# Patient Record
Sex: FEMALE | Race: BLACK OR AFRICAN AMERICAN | Employment: UNEMPLOYED | URBAN - METROPOLITAN AREA
[De-identification: names, ages, dates, MRNs, and addresses within clinical notes are randomized per-mention and may not be internally consistent; named-entity substitution may affect disease eponyms.]

---

## 2017-01-20 ENCOUNTER — APPOINTMENT (OUTPATIENT)
Dept: CT IMAGING | Age: 72
End: 2017-01-20
Payer: MEDICAID

## 2017-01-20 ENCOUNTER — HOSPITAL ENCOUNTER (OUTPATIENT)
Age: 72
Setting detail: OBSERVATION
Discharge: NURSING, REHAB OR LONG-TERM CARE | End: 2017-01-21
Attending: HOSPITALIST | Admitting: HOSPITALIST
Payer: MEDICAID

## 2017-01-20 DIAGNOSIS — K92.2 UPPER GI BLEED: Primary | ICD-10-CM

## 2017-01-20 DIAGNOSIS — K92.0 HEMATEMESIS WITH NAUSEA: ICD-10-CM

## 2017-01-20 PROBLEM — R11.2 NAUSEA & VOMITING: Status: ACTIVE | Noted: 2017-01-20

## 2017-01-20 LAB
ABO/RH: NORMAL
ALBUMIN SERPL-MCNC: 4 G/DL (ref 3.9–4.9)
ALBUMIN SERPL-MCNC: 4.1 G/DL (ref 3.9–4.9)
ALP BLD-CCNC: 75 U/L (ref 40–130)
ALP BLD-CCNC: 76 U/L (ref 40–130)
ALT SERPL-CCNC: 8 U/L (ref 0–33)
ALT SERPL-CCNC: 8 U/L (ref 0–33)
AMMONIA: 26 UMOL/L (ref 11–51)
AMYLASE: 53 U/L (ref 28–100)
ANION GAP SERPL CALCULATED.3IONS-SCNC: 12 MEQ/L (ref 7–13)
ANTIBODY SCREEN: NORMAL
APTT: 26.9 SEC (ref 21.6–35.4)
AST SERPL-CCNC: 11 U/L (ref 0–35)
AST SERPL-CCNC: 13 U/L (ref 0–35)
BASOPHILS ABSOLUTE: 0.1 K/UL (ref 0–0.2)
BASOPHILS RELATIVE PERCENT: 1.5 %
BILIRUB SERPL-MCNC: 0.6 MG/DL (ref 0–1.2)
BILIRUB SERPL-MCNC: 0.7 MG/DL (ref 0–1.2)
BILIRUBIN DIRECT: 0.2 MG/DL (ref 0–0.3)
BILIRUBIN, INDIRECT: 0.5 MG/DL (ref 0–0.6)
BUN BLDV-MCNC: 34 MG/DL (ref 8–23)
C-REACTIVE PROTEIN, HIGH SENSITIVITY: 1 MG/L (ref 0–5)
CALCIUM SERPL-MCNC: 9.2 MG/DL (ref 8.6–10.2)
CHLORIDE BLD-SCNC: 102 MEQ/L (ref 98–107)
CO2: 25 MEQ/L (ref 22–29)
CREAT SERPL-MCNC: 1.1 MG/DL (ref 0.5–0.9)
EOSINOPHILS ABSOLUTE: 0 K/UL (ref 0–0.7)
EOSINOPHILS RELATIVE PERCENT: 0.1 %
GFR AFRICAN AMERICAN: 53
GFR AFRICAN AMERICAN: 58.9
GFR NON-AFRICAN AMERICAN: 44
GFR NON-AFRICAN AMERICAN: 48.7
GLOBULIN: 2.4 G/DL (ref 2.3–3.5)
GLUCOSE BLD-MCNC: 148 MG/DL (ref 74–109)
HCT VFR BLD CALC: 37.2 % (ref 37–47)
HEMOGLOBIN: 12.2 G/DL (ref 12–16)
INR BLD: 1
LACTIC ACID: 1.7 MMOL/L (ref 0.5–2.2)
LIPASE: 18 U/L (ref 13–60)
LIPASE: 18 U/L (ref 13–60)
LYMPHOCYTES ABSOLUTE: 1.9 K/UL (ref 1–4.8)
LYMPHOCYTES RELATIVE PERCENT: 19.6 %
MCH RBC QN AUTO: 27 PG (ref 27–31.3)
MCHC RBC AUTO-ENTMCNC: 32.7 % (ref 33–37)
MCV RBC AUTO: 82.5 FL (ref 82–100)
MONOCYTES ABSOLUTE: 0.5 K/UL (ref 0.2–0.8)
MONOCYTES RELATIVE PERCENT: 5.1 %
NEUTROPHILS ABSOLUTE: 7.1 K/UL (ref 1.4–6.5)
NEUTROPHILS RELATIVE PERCENT: 73.7 %
PDW BLD-RTO: 15.7 % (ref 11.5–14.5)
PERFORMED ON: ABNORMAL
PLATELET # BLD: 150 K/UL (ref 130–400)
POC CREATININE: 1.2 MG/DL (ref 0.6–1.2)
POC SAMPLE TYPE: ABNORMAL
POTASSIUM SERPL-SCNC: 4.2 MEQ/L (ref 3.5–5.1)
PROTHROMBIN TIME: 10.3 SEC (ref 8.1–13.7)
RBC # BLD: 4.51 M/UL (ref 4.2–5.4)
SEDIMENTATION RATE, ERYTHROCYTE: 25 MM (ref 0–30)
SODIUM BLD-SCNC: 139 MEQ/L (ref 132–144)
TOTAL PROTEIN: 6.4 G/DL (ref 6.4–8.1)
TOTAL PROTEIN: 6.6 G/DL (ref 6.4–8.1)
WBC # BLD: 9.7 K/UL (ref 4.8–10.8)

## 2017-01-20 PROCEDURE — 83605 ASSAY OF LACTIC ACID: CPT

## 2017-01-20 PROCEDURE — 82150 ASSAY OF AMYLASE: CPT

## 2017-01-20 PROCEDURE — 93005 ELECTROCARDIOGRAM TRACING: CPT

## 2017-01-20 PROCEDURE — 2580000003 HC RX 258: Performed by: PHYSICIAN ASSISTANT

## 2017-01-20 PROCEDURE — C9113 INJ PANTOPRAZOLE SODIUM, VIA: HCPCS | Performed by: PHYSICIAN ASSISTANT

## 2017-01-20 PROCEDURE — 82140 ASSAY OF AMMONIA: CPT

## 2017-01-20 PROCEDURE — 96374 THER/PROPH/DIAG INJ IV PUSH: CPT

## 2017-01-20 PROCEDURE — 85025 COMPLETE CBC W/AUTO DIFF WBC: CPT

## 2017-01-20 PROCEDURE — G0378 HOSPITAL OBSERVATION PER HR: HCPCS

## 2017-01-20 PROCEDURE — 85730 THROMBOPLASTIN TIME PARTIAL: CPT

## 2017-01-20 PROCEDURE — 6360000004 HC RX CONTRAST MEDICATION: Performed by: RADIOLOGY

## 2017-01-20 PROCEDURE — 6360000002 HC RX W HCPCS: Performed by: HOSPITALIST

## 2017-01-20 PROCEDURE — 86900 BLOOD TYPING SEROLOGIC ABO: CPT

## 2017-01-20 PROCEDURE — 85652 RBC SED RATE AUTOMATED: CPT

## 2017-01-20 PROCEDURE — 99285 EMERGENCY DEPT VISIT HI MDM: CPT

## 2017-01-20 PROCEDURE — 74177 CT ABD & PELVIS W/CONTRAST: CPT

## 2017-01-20 PROCEDURE — 80053 COMPREHEN METABOLIC PANEL: CPT

## 2017-01-20 PROCEDURE — 36415 COLL VENOUS BLD VENIPUNCTURE: CPT

## 2017-01-20 PROCEDURE — 96372 THER/PROPH/DIAG INJ SC/IM: CPT

## 2017-01-20 PROCEDURE — 85610 PROTHROMBIN TIME: CPT

## 2017-01-20 PROCEDURE — 86850 RBC ANTIBODY SCREEN: CPT

## 2017-01-20 PROCEDURE — 83690 ASSAY OF LIPASE: CPT

## 2017-01-20 PROCEDURE — 86901 BLOOD TYPING SEROLOGIC RH(D): CPT

## 2017-01-20 PROCEDURE — 86141 C-REACTIVE PROTEIN HS: CPT

## 2017-01-20 PROCEDURE — 6360000002 HC RX W HCPCS: Performed by: PHYSICIAN ASSISTANT

## 2017-01-20 PROCEDURE — 2580000003 HC RX 258: Performed by: HOSPITALIST

## 2017-01-20 RX ORDER — SODIUM CHLORIDE 0.9 % (FLUSH) 0.9 %
10 SYRINGE (ML) INJECTION PRN
Status: DISCONTINUED | OUTPATIENT
Start: 2017-01-20 | End: 2017-01-21 | Stop reason: HOSPADM

## 2017-01-20 RX ORDER — DOCUSATE SODIUM 100 MG/1
100 CAPSULE, LIQUID FILLED ORAL 2 TIMES DAILY
Status: DISCONTINUED | OUTPATIENT
Start: 2017-01-20 | End: 2017-01-21 | Stop reason: HOSPADM

## 2017-01-20 RX ORDER — ONDANSETRON 2 MG/ML
4 INJECTION INTRAMUSCULAR; INTRAVENOUS EVERY 6 HOURS PRN
Status: DISCONTINUED | OUTPATIENT
Start: 2017-01-20 | End: 2017-01-21 | Stop reason: HOSPADM

## 2017-01-20 RX ORDER — PANTOPRAZOLE SODIUM 40 MG/10ML
40 INJECTION, POWDER, LYOPHILIZED, FOR SOLUTION INTRAVENOUS DAILY
Status: DISCONTINUED | OUTPATIENT
Start: 2017-01-20 | End: 2017-01-21 | Stop reason: HOSPADM

## 2017-01-20 RX ORDER — PANTOPRAZOLE SODIUM 40 MG/1
40 TABLET, DELAYED RELEASE ORAL ONCE
Status: DISCONTINUED | OUTPATIENT
Start: 2017-01-20 | End: 2017-01-20

## 2017-01-20 RX ORDER — SODIUM CHLORIDE 0.9 % (FLUSH) 0.9 %
10 SYRINGE (ML) INJECTION EVERY 12 HOURS SCHEDULED
Status: DISCONTINUED | OUTPATIENT
Start: 2017-01-20 | End: 2017-01-21 | Stop reason: HOSPADM

## 2017-01-20 RX ORDER — 0.9 % SODIUM CHLORIDE 0.9 %
10 VIAL (ML) INJECTION EVERY 12 HOURS SCHEDULED
Status: CANCELLED | OUTPATIENT
Start: 2017-01-20

## 2017-01-20 RX ORDER — 0.9 % SODIUM CHLORIDE 0.9 %
10 VIAL (ML) INJECTION PRN
Status: CANCELLED | OUTPATIENT
Start: 2017-01-20

## 2017-01-20 RX ORDER — ACETAMINOPHEN 325 MG/1
650 TABLET ORAL EVERY 4 HOURS PRN
Status: DISCONTINUED | OUTPATIENT
Start: 2017-01-20 | End: 2017-01-21 | Stop reason: HOSPADM

## 2017-01-20 RX ORDER — SODIUM CHLORIDE 9 MG/ML
INJECTION, SOLUTION INTRAVENOUS CONTINUOUS
Status: DISCONTINUED | OUTPATIENT
Start: 2017-01-20 | End: 2017-01-21 | Stop reason: HOSPADM

## 2017-01-20 RX ADMIN — IOPAMIDOL 100 ML: 612 INJECTION, SOLUTION INTRAVENOUS at 15:30

## 2017-01-20 RX ADMIN — SODIUM CHLORIDE: 900 INJECTION, SOLUTION INTRAVENOUS at 22:29

## 2017-01-20 RX ADMIN — PANTOPRAZOLE SODIUM 40 MG: 40 INJECTION, POWDER, FOR SOLUTION INTRAVENOUS at 15:14

## 2017-01-20 RX ADMIN — ENOXAPARIN SODIUM 40 MG: 40 INJECTION SUBCUTANEOUS at 22:33

## 2017-01-20 RX ADMIN — SODIUM CHLORIDE: 900 INJECTION, SOLUTION INTRAVENOUS at 15:13

## 2017-01-20 RX ADMIN — SODIUM CHLORIDE, PRESERVATIVE FREE 10 ML: 5 INJECTION INTRAVENOUS at 22:29

## 2017-01-20 ASSESSMENT — ENCOUNTER SYMPTOMS
RHINORRHEA: 0
SORE THROAT: 0
ABDOMINAL PAIN: 0
STRIDOR: 0
EYE DISCHARGE: 0
WHEEZING: 0
COUGH: 0
SHORTNESS OF BREATH: 0
VOMITING: 1
CONSTIPATION: 0
CHOKING: 0
COLOR CHANGE: 0
DIARRHEA: 0
ABDOMINAL DISTENTION: 0
NAUSEA: 1

## 2017-01-20 ASSESSMENT — PAIN DESCRIPTION - PAIN TYPE: TYPE: ACUTE PAIN

## 2017-01-20 ASSESSMENT — PAIN SCALES - WONG BAKER: WONGBAKER_NUMERICALRESPONSE: 0

## 2017-01-20 ASSESSMENT — PAIN DESCRIPTION - ONSET: ONSET: GRADUAL

## 2017-01-20 ASSESSMENT — PAIN DESCRIPTION - PROGRESSION: CLINICAL_PROGRESSION: NOT CHANGED

## 2017-01-20 ASSESSMENT — PAIN SCALES - GENERAL: PAINLEVEL_OUTOF10: 0

## 2017-01-20 ASSESSMENT — PAIN DESCRIPTION - FREQUENCY: FREQUENCY: INTERMITTENT

## 2017-01-20 ASSESSMENT — PAIN DESCRIPTION - DESCRIPTORS: DESCRIPTORS: CRAMPING

## 2017-01-20 ASSESSMENT — PAIN DESCRIPTION - LOCATION: LOCATION: ABDOMEN

## 2017-01-21 ENCOUNTER — SURGERY (OUTPATIENT)
Age: 72
End: 2017-01-21

## 2017-01-21 ENCOUNTER — ANESTHESIA (OUTPATIENT)
Dept: OPERATING ROOM | Age: 72
End: 2017-01-21
Payer: MEDICAID

## 2017-01-21 ENCOUNTER — ANESTHESIA EVENT (OUTPATIENT)
Dept: OPERATING ROOM | Age: 72
End: 2017-01-21
Payer: MEDICAID

## 2017-01-21 VITALS
RESPIRATION RATE: 14 BRPM | OXYGEN SATURATION: 98 % | TEMPERATURE: 98.4 F | HEART RATE: 77 BPM | DIASTOLIC BLOOD PRESSURE: 71 MMHG | SYSTOLIC BLOOD PRESSURE: 165 MMHG | BODY MASS INDEX: 23.39 KG/M2 | WEIGHT: 132 LBS | HEIGHT: 63 IN

## 2017-01-21 VITALS
DIASTOLIC BLOOD PRESSURE: 62 MMHG | OXYGEN SATURATION: 99 % | SYSTOLIC BLOOD PRESSURE: 116 MMHG | RESPIRATION RATE: 10 BRPM

## 2017-01-21 PROBLEM — I16.0 HYPERTENSIVE URGENCY: Status: ACTIVE | Noted: 2017-01-21

## 2017-01-21 LAB
ANION GAP SERPL CALCULATED.3IONS-SCNC: 11 MEQ/L (ref 7–13)
BASOPHILS ABSOLUTE: 0.1 K/UL (ref 0–0.2)
BASOPHILS RELATIVE PERCENT: 0.8 %
BUN BLDV-MCNC: 22 MG/DL (ref 8–23)
CALCIUM SERPL-MCNC: 8.8 MG/DL (ref 8.6–10.2)
CHLORIDE BLD-SCNC: 110 MEQ/L (ref 98–107)
CO2: 23 MEQ/L (ref 22–29)
CREAT SERPL-MCNC: 0.71 MG/DL (ref 0.5–0.9)
EOSINOPHILS ABSOLUTE: 0 K/UL (ref 0–0.7)
EOSINOPHILS RELATIVE PERCENT: 0.2 %
GFR AFRICAN AMERICAN: >60
GFR NON-AFRICAN AMERICAN: >60
GLUCOSE BLD-MCNC: 144 MG/DL (ref 74–109)
HCT VFR BLD CALC: 35.5 % (ref 37–47)
HEMOGLOBIN: 11.4 G/DL (ref 12–16)
LYMPHOCYTES ABSOLUTE: 1.7 K/UL (ref 1–4.8)
LYMPHOCYTES RELATIVE PERCENT: 23 %
MAGNESIUM: 2.3 MG/DL (ref 1.7–2.3)
MCH RBC QN AUTO: 26.7 PG (ref 27–31.3)
MCHC RBC AUTO-ENTMCNC: 32.3 % (ref 33–37)
MCV RBC AUTO: 82.8 FL (ref 82–100)
MONOCYTES ABSOLUTE: 0.5 K/UL (ref 0.2–0.8)
MONOCYTES RELATIVE PERCENT: 7.1 %
NEUTROPHILS ABSOLUTE: 5.2 K/UL (ref 1.4–6.5)
NEUTROPHILS RELATIVE PERCENT: 68.9 %
PDW BLD-RTO: 16.1 % (ref 11.5–14.5)
PLATELET # BLD: 140 K/UL (ref 130–400)
POTASSIUM SERPL-SCNC: 4.2 MEQ/L (ref 3.5–5.1)
RBC # BLD: 4.28 M/UL (ref 4.2–5.4)
SODIUM BLD-SCNC: 144 MEQ/L (ref 132–144)
WBC # BLD: 7.6 K/UL (ref 4.8–10.8)

## 2017-01-21 PROCEDURE — 3609012400 HC EGD TRANSORAL BIOPSY SINGLE/MULTIPLE

## 2017-01-21 PROCEDURE — 2580000003 HC RX 258: Performed by: PHYSICIAN ASSISTANT

## 2017-01-21 PROCEDURE — G0378 HOSPITAL OBSERVATION PER HR: HCPCS

## 2017-01-21 PROCEDURE — 6360000002 HC RX W HCPCS: Performed by: STUDENT IN AN ORGANIZED HEALTH CARE EDUCATION/TRAINING PROGRAM

## 2017-01-21 PROCEDURE — 7100000001 HC PACU RECOVERY - ADDTL 15 MIN

## 2017-01-21 PROCEDURE — 80048 BASIC METABOLIC PNL TOTAL CA: CPT

## 2017-01-21 PROCEDURE — 3609012400 HC EGD TRANSORAL BIOPSY SINGLE/MULTIPLE: Performed by: SPECIALIST

## 2017-01-21 PROCEDURE — 2580000003 HC RX 258: Performed by: HOSPITALIST

## 2017-01-21 PROCEDURE — 85025 COMPLETE CBC W/AUTO DIFF WBC: CPT

## 2017-01-21 PROCEDURE — 7100000000 HC PACU RECOVERY - FIRST 15 MIN

## 2017-01-21 PROCEDURE — 7100000001 HC PACU RECOVERY - ADDTL 15 MIN: Performed by: SPECIALIST

## 2017-01-21 PROCEDURE — 6360000002 HC RX W HCPCS: Performed by: PHYSICIAN ASSISTANT

## 2017-01-21 PROCEDURE — 7100000000 HC PACU RECOVERY - FIRST 15 MIN: Performed by: SPECIALIST

## 2017-01-21 PROCEDURE — C9113 INJ PANTOPRAZOLE SODIUM, VIA: HCPCS | Performed by: PHYSICIAN ASSISTANT

## 2017-01-21 PROCEDURE — 36415 COLL VENOUS BLD VENIPUNCTURE: CPT

## 2017-01-21 PROCEDURE — 83735 ASSAY OF MAGNESIUM: CPT

## 2017-01-21 PROCEDURE — 2580000003 HC RX 258: Performed by: SPECIALIST

## 2017-01-21 RX ORDER — PANTOPRAZOLE SODIUM 40 MG/1
40 TABLET, DELAYED RELEASE ORAL DAILY
Qty: 30 TABLET | Refills: 0 | Status: SHIPPED | OUTPATIENT
Start: 2017-01-21 | End: 2020-08-07

## 2017-01-21 RX ORDER — PANTOPRAZOLE SODIUM 40 MG/10ML
40 INJECTION, POWDER, LYOPHILIZED, FOR SOLUTION INTRAVENOUS DAILY
Qty: 30 EACH | Refills: 0 | Status: SHIPPED | OUTPATIENT
Start: 2017-01-21 | End: 2017-01-21 | Stop reason: HOSPADM

## 2017-01-21 RX ORDER — PROPOFOL 10 MG/ML
INJECTION, EMULSION INTRAVENOUS PRN
Status: DISCONTINUED | OUTPATIENT
Start: 2017-01-21 | End: 2017-01-21 | Stop reason: SDUPTHER

## 2017-01-21 RX ORDER — HYDRALAZINE HYDROCHLORIDE 20 MG/ML
10 INJECTION INTRAMUSCULAR; INTRAVENOUS EVERY 6 HOURS PRN
Status: DISCONTINUED | OUTPATIENT
Start: 2017-01-21 | End: 2017-01-21 | Stop reason: HOSPADM

## 2017-01-21 RX ORDER — MAGNESIUM HYDROXIDE 1200 MG/15ML
LIQUID ORAL PRN
Status: DISCONTINUED | OUTPATIENT
Start: 2017-01-21 | End: 2017-01-21 | Stop reason: HOSPADM

## 2017-01-21 RX ADMIN — WATER 500 ML: 100 IRRIGANT IRRIGATION at 09:35

## 2017-01-21 RX ADMIN — SODIUM CHLORIDE: 900 INJECTION, SOLUTION INTRAVENOUS at 06:33

## 2017-01-21 RX ADMIN — PROPOFOL 20 MG: 10 INJECTION, EMULSION INTRAVENOUS at 09:31

## 2017-01-21 RX ADMIN — SODIUM CHLORIDE, PRESERVATIVE FREE 10 ML: 5 INJECTION INTRAVENOUS at 11:11

## 2017-01-21 RX ADMIN — SODIUM CHLORIDE: 900 INJECTION, SOLUTION INTRAVENOUS at 09:25

## 2017-01-21 RX ADMIN — PANTOPRAZOLE SODIUM 40 MG: 40 INJECTION, POWDER, FOR SOLUTION INTRAVENOUS at 11:11

## 2017-01-21 RX ADMIN — PROPOFOL 20 MG: 10 INJECTION, EMULSION INTRAVENOUS at 09:28

## 2017-01-31 LAB
EKG ATRIAL RATE: 92 BPM
EKG P AXIS: 63 DEGREES
EKG P-R INTERVAL: 122 MS
EKG Q-T INTERVAL: 364 MS
EKG QRS DURATION: 74 MS
EKG QTC CALCULATION (BAZETT): 450 MS
EKG R AXIS: 25 DEGREES
EKG T AXIS: 60 DEGREES
EKG VENTRICULAR RATE: 92 BPM

## 2017-03-19 ENCOUNTER — HOSPITAL ENCOUNTER (EMERGENCY)
Age: 72
Discharge: HOME | End: 2017-03-19
Payer: MEDICAID

## 2017-03-19 VITALS
OXYGEN SATURATION: 98 % | TEMPERATURE: 99 F | HEART RATE: 78 BPM | HEIGHT: 65 IN | SYSTOLIC BLOOD PRESSURE: 127 MMHG | WEIGHT: 270 LBS | BODY MASS INDEX: 44.98 KG/M2 | RESPIRATION RATE: 18 BRPM | DIASTOLIC BLOOD PRESSURE: 77 MMHG

## 2017-03-19 DIAGNOSIS — K22.10 EROSIVE ESOPHAGITIS: Primary | ICD-10-CM

## 2017-03-19 LAB
ABO/RH: NORMAL
ALBUMIN SERPL-MCNC: 3.6 G/DL (ref 3.9–4.9)
ALP BLD-CCNC: 75 U/L (ref 40–130)
ALT SERPL-CCNC: 10 U/L (ref 0–33)
ANION GAP SERPL CALCULATED.3IONS-SCNC: 10 MEQ/L (ref 7–13)
ANTIBODY SCREEN: NORMAL
APTT: 26.4 SEC (ref 21.6–35.4)
AST SERPL-CCNC: 12 U/L (ref 0–35)
BASOPHILS ABSOLUTE: 0.1 K/UL (ref 0–0.2)
BASOPHILS RELATIVE PERCENT: 1 %
BILIRUB SERPL-MCNC: 0.4 MG/DL (ref 0–1.2)
BUN BLDV-MCNC: 15 MG/DL (ref 8–23)
CALCIUM SERPL-MCNC: 9 MG/DL (ref 8.6–10.2)
CHLORIDE BLD-SCNC: 98 MEQ/L (ref 98–107)
CO2: 27 MEQ/L (ref 22–29)
CREAT SERPL-MCNC: 0.53 MG/DL (ref 0.5–0.9)
EOSINOPHILS ABSOLUTE: 0 K/UL (ref 0–0.7)
EOSINOPHILS RELATIVE PERCENT: 0.1 %
GFR AFRICAN AMERICAN: >60
GFR NON-AFRICAN AMERICAN: >60
GLOBULIN: 2.9 G/DL (ref 2.3–3.5)
GLUCOSE BLD-MCNC: 177 MG/DL (ref 74–109)
HCT VFR BLD CALC: 32.4 % (ref 37–47)
HEMOGLOBIN: 10.7 G/DL (ref 12–16)
INR BLD: 1
LYMPHOCYTES ABSOLUTE: 1.4 K/UL (ref 1–4.8)
LYMPHOCYTES RELATIVE PERCENT: 14.8 %
MCH RBC QN AUTO: 26.5 PG (ref 27–31.3)
MCHC RBC AUTO-ENTMCNC: 33 % (ref 33–37)
MCV RBC AUTO: 80.3 FL (ref 82–100)
MONOCYTES ABSOLUTE: 0.5 K/UL (ref 0.2–0.8)
MONOCYTES RELATIVE PERCENT: 5 %
NEUTROPHILS ABSOLUTE: 7.3 K/UL (ref 1.4–6.5)
NEUTROPHILS RELATIVE PERCENT: 79.1 %
PDW BLD-RTO: 16.2 % (ref 11.5–14.5)
PLATELET # BLD: 203 K/UL (ref 130–400)
POTASSIUM SERPL-SCNC: 4 MEQ/L (ref 3.5–5.1)
PROTHROMBIN TIME: 10.5 SEC (ref 8.1–13.7)
RBC # BLD: 4.04 M/UL (ref 4.2–5.4)
SODIUM BLD-SCNC: 135 MEQ/L (ref 132–144)
TOTAL PROTEIN: 6.5 G/DL (ref 6.4–8.1)
WBC # BLD: 9.3 K/UL (ref 4.8–10.8)

## 2017-03-19 PROCEDURE — 96374 THER/PROPH/DIAG INJ IV PUSH: CPT

## 2017-03-19 PROCEDURE — 99285 EMERGENCY DEPT VISIT HI MDM: CPT

## 2017-03-19 PROCEDURE — 85610 PROTHROMBIN TIME: CPT

## 2017-03-19 PROCEDURE — 85025 COMPLETE CBC W/AUTO DIFF WBC: CPT

## 2017-03-19 PROCEDURE — 93005 ELECTROCARDIOGRAM TRACING: CPT

## 2017-03-19 PROCEDURE — 6360000002 HC RX W HCPCS: Performed by: PHYSICIAN ASSISTANT

## 2017-03-19 PROCEDURE — 85730 THROMBOPLASTIN TIME PARTIAL: CPT

## 2017-03-19 PROCEDURE — 86850 RBC ANTIBODY SCREEN: CPT

## 2017-03-19 PROCEDURE — 36415 COLL VENOUS BLD VENIPUNCTURE: CPT

## 2017-03-19 PROCEDURE — C9113 INJ PANTOPRAZOLE SODIUM, VIA: HCPCS | Performed by: PHYSICIAN ASSISTANT

## 2017-03-19 PROCEDURE — 80053 COMPREHEN METABOLIC PANEL: CPT

## 2017-03-19 PROCEDURE — 86900 BLOOD TYPING SEROLOGIC ABO: CPT

## 2017-03-19 PROCEDURE — 86901 BLOOD TYPING SEROLOGIC RH(D): CPT

## 2017-03-19 RX ORDER — PANTOPRAZOLE SODIUM 40 MG/1
40 TABLET, DELAYED RELEASE ORAL ONCE
Status: DISCONTINUED | OUTPATIENT
Start: 2017-03-19 | End: 2017-03-19

## 2017-03-19 RX ORDER — PANTOPRAZOLE SODIUM 40 MG/10ML
40 INJECTION, POWDER, LYOPHILIZED, FOR SOLUTION INTRAVENOUS DAILY
Status: DISCONTINUED | OUTPATIENT
Start: 2017-03-19 | End: 2017-03-19 | Stop reason: HOSPADM

## 2017-03-19 RX ORDER — ONDANSETRON 2 MG/ML
4 INJECTION INTRAMUSCULAR; INTRAVENOUS ONCE
Status: COMPLETED | OUTPATIENT
Start: 2017-03-19 | End: 2017-03-19

## 2017-03-19 RX ADMIN — PANTOPRAZOLE SODIUM 40 MG: 40 INJECTION, POWDER, FOR SOLUTION INTRAVENOUS at 08:34

## 2017-03-19 RX ADMIN — ONDANSETRON 4 MG: 2 INJECTION, SOLUTION INTRAMUSCULAR; INTRAVENOUS at 08:34

## 2017-03-19 ASSESSMENT — ENCOUNTER SYMPTOMS
RHINORRHEA: 0
SHORTNESS OF BREATH: 0
COLOR CHANGE: 0
EYE DISCHARGE: 0
ABDOMINAL PAIN: 0
CHOKING: 0
COUGH: 0
CONSTIPATION: 0
CHEST TIGHTNESS: 0
NAUSEA: 1
DIARRHEA: 0
VOMITING: 1
STRIDOR: 0
WHEEZING: 0
SORE THROAT: 0
ABDOMINAL DISTENTION: 0

## 2017-03-20 LAB
EKG ATRIAL RATE: 93 BPM
EKG P AXIS: 58 DEGREES
EKG P-R INTERVAL: 124 MS
EKG Q-T INTERVAL: 362 MS
EKG QRS DURATION: 72 MS
EKG QTC CALCULATION (BAZETT): 450 MS
EKG R AXIS: 4 DEGREES
EKG T AXIS: 18 DEGREES
EKG VENTRICULAR RATE: 93 BPM

## 2017-06-20 ENCOUNTER — HOSPITAL ENCOUNTER (OUTPATIENT)
Dept: CT IMAGING | Age: 72
Discharge: HOME | End: 2017-06-20
Payer: MEDICAID
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
## 2017-06-20 PROCEDURE — 70450 CT HEAD/BRAIN W/O DYE: CPT

## 2017-09-04 ENCOUNTER — HOSPITAL ENCOUNTER (EMERGENCY)
Age: 72
Discharge: HOME | End: 2017-09-04
Attending: STUDENT IN AN ORGANIZED HEALTH CARE EDUCATION/TRAINING PROGRAM
Payer: MEDICAID

## 2017-09-04 ENCOUNTER — APPOINTMENT (OUTPATIENT)
Dept: GENERAL RADIOLOGY | Age: 72
End: 2017-09-04
Payer: MEDICAID

## 2017-09-04 ENCOUNTER — APPOINTMENT (OUTPATIENT)
Dept: CT IMAGING | Age: 72
End: 2017-09-04
Payer: MEDICAID

## 2017-09-04 VITALS
BODY MASS INDEX: 40.85 KG/M2 | HEIGHT: 62 IN | SYSTOLIC BLOOD PRESSURE: 121 MMHG | RESPIRATION RATE: 20 BRPM | DIASTOLIC BLOOD PRESSURE: 55 MMHG | HEART RATE: 77 BPM | WEIGHT: 222 LBS | OXYGEN SATURATION: 100 % | TEMPERATURE: 99 F

## 2017-09-04 DIAGNOSIS — R11.2 NON-INTRACTABLE VOMITING WITH NAUSEA, UNSPECIFIED VOMITING TYPE: Primary | ICD-10-CM

## 2017-09-04 DIAGNOSIS — R53.1 EPISODIC WEAKNESS: ICD-10-CM

## 2017-09-04 DIAGNOSIS — N28.9 RENAL INSUFFICIENCY: ICD-10-CM

## 2017-09-04 LAB
ABO/RH: NORMAL
ALBUMIN SERPL-MCNC: 3.5 G/DL (ref 3.9–4.9)
ALP BLD-CCNC: 79 U/L (ref 40–130)
ALT SERPL-CCNC: 10 U/L (ref 0–33)
ANION GAP SERPL CALCULATED.3IONS-SCNC: 21 MEQ/L (ref 7–13)
ANISOCYTOSIS: ABNORMAL
ANTIBODY SCREEN: NORMAL
AST SERPL-CCNC: 12 U/L (ref 0–35)
BASOPHILS ABSOLUTE: 0.1 K/UL (ref 0–0.2)
BASOPHILS RELATIVE PERCENT: 1 %
BILIRUB SERPL-MCNC: 0.5 MG/DL (ref 0–1.2)
BUN BLDV-MCNC: 35 MG/DL (ref 8–23)
CALCIUM SERPL-MCNC: 9 MG/DL (ref 8.6–10.2)
CHLORIDE BLD-SCNC: 106 MEQ/L (ref 98–107)
CO2: 24 MEQ/L (ref 22–29)
CREAT SERPL-MCNC: 1.39 MG/DL (ref 0.5–0.9)
EOSINOPHILS ABSOLUTE: 0 K/UL (ref 0–0.7)
EOSINOPHILS RELATIVE PERCENT: 0.1 %
GFR AFRICAN AMERICAN: 44.9
GFR NON-AFRICAN AMERICAN: 37.1
GLOBULIN: 2.8 G/DL (ref 2.3–3.5)
GLUCOSE BLD-MCNC: 124 MG/DL (ref 74–109)
HCT VFR BLD CALC: 34.3 % (ref 37–47)
HEMOGLOBIN: 10.7 G/DL (ref 12–16)
LYMPHOCYTES ABSOLUTE: 2.3 K/UL (ref 1–4.8)
LYMPHOCYTES RELATIVE PERCENT: 32.3 %
MCH RBC QN AUTO: 23.2 PG (ref 27–31.3)
MCHC RBC AUTO-ENTMCNC: 31.3 % (ref 33–37)
MCV RBC AUTO: 74 FL (ref 82–100)
MICROCYTES: ABNORMAL
MONOCYTES ABSOLUTE: 0.5 K/UL (ref 0.2–0.8)
MONOCYTES RELATIVE PERCENT: 7.6 %
NEUTROPHILS ABSOLUTE: 4.2 K/UL (ref 1.4–6.5)
NEUTROPHILS RELATIVE PERCENT: 59 %
PDW BLD-RTO: 20 % (ref 11.5–14.5)
PLATELET # BLD: 157 K/UL (ref 130–400)
PLATELET # BLD: ABNORMAL K/UL (ref 130–400)
POTASSIUM SERPL-SCNC: 4 MEQ/L (ref 3.5–5.1)
RBC # BLD: 4.63 M/UL (ref 4.2–5.4)
SODIUM BLD-SCNC: 151 MEQ/L (ref 132–144)
TOTAL CK: 26 U/L (ref 0–170)
TOTAL PROTEIN: 6.3 G/DL (ref 6.4–8.1)
TROPONIN: <0.01 NG/ML (ref 0–0.01)
WBC # BLD: 7 K/UL (ref 4.8–10.8)

## 2017-09-04 PROCEDURE — 85025 COMPLETE CBC W/AUTO DIFF WBC: CPT

## 2017-09-04 PROCEDURE — 99284 EMERGENCY DEPT VISIT MOD MDM: CPT

## 2017-09-04 PROCEDURE — 93005 ELECTROCARDIOGRAM TRACING: CPT

## 2017-09-04 PROCEDURE — 6370000000 HC RX 637 (ALT 250 FOR IP): Performed by: NURSE PRACTITIONER

## 2017-09-04 PROCEDURE — 84484 ASSAY OF TROPONIN QUANT: CPT

## 2017-09-04 PROCEDURE — 72125 CT NECK SPINE W/O DYE: CPT

## 2017-09-04 PROCEDURE — 70450 CT HEAD/BRAIN W/O DYE: CPT

## 2017-09-04 PROCEDURE — 85049 AUTOMATED PLATELET COUNT: CPT

## 2017-09-04 PROCEDURE — 86850 RBC ANTIBODY SCREEN: CPT

## 2017-09-04 PROCEDURE — 71010 XR CHEST PORTABLE: CPT

## 2017-09-04 PROCEDURE — 86900 BLOOD TYPING SEROLOGIC ABO: CPT

## 2017-09-04 PROCEDURE — 80053 COMPREHEN METABOLIC PANEL: CPT

## 2017-09-04 PROCEDURE — 36415 COLL VENOUS BLD VENIPUNCTURE: CPT

## 2017-09-04 PROCEDURE — 82550 ASSAY OF CK (CPK): CPT

## 2017-09-04 PROCEDURE — 86901 BLOOD TYPING SEROLOGIC RH(D): CPT

## 2017-09-04 PROCEDURE — 81003 URINALYSIS AUTO W/O SCOPE: CPT

## 2017-09-04 RX ORDER — LORAZEPAM 1 MG/1
1 TABLET ORAL ONCE
Status: COMPLETED | OUTPATIENT
Start: 2017-09-04 | End: 2017-09-04

## 2017-09-04 RX ADMIN — LORAZEPAM 1 MG: 1 TABLET ORAL at 15:11

## 2017-09-05 LAB
EKG ATRIAL RATE: 74 BPM
EKG P AXIS: 62 DEGREES
EKG P-R INTERVAL: 130 MS
EKG Q-T INTERVAL: 404 MS
EKG QRS DURATION: 78 MS
EKG QTC CALCULATION (BAZETT): 448 MS
EKG R AXIS: 17 DEGREES
EKG T AXIS: 54 DEGREES
EKG VENTRICULAR RATE: 74 BPM

## 2017-09-05 PROCEDURE — 93010 ELECTROCARDIOGRAM REPORT: CPT | Performed by: INTERNAL MEDICINE

## 2017-11-16 ENCOUNTER — HOSPITAL ENCOUNTER (EMERGENCY)
Age: 72
Discharge: NURSING, REHAB OR LONG-TERM CARE | End: 2017-11-16
Attending: EMERGENCY MEDICINE
Payer: MEDICAID

## 2017-11-16 VITALS
SYSTOLIC BLOOD PRESSURE: 142 MMHG | OXYGEN SATURATION: 100 % | DIASTOLIC BLOOD PRESSURE: 65 MMHG | RESPIRATION RATE: 18 BRPM | HEART RATE: 60 BPM | TEMPERATURE: 98.6 F | HEIGHT: 65 IN | WEIGHT: 220 LBS | BODY MASS INDEX: 36.65 KG/M2

## 2017-11-16 DIAGNOSIS — T78.3XXA ALLERGIC ANGIOEDEMA, INITIAL ENCOUNTER: Primary | ICD-10-CM

## 2017-11-16 PROCEDURE — 99283 EMERGENCY DEPT VISIT LOW MDM: CPT

## 2017-11-16 PROCEDURE — 6370000000 HC RX 637 (ALT 250 FOR IP): Performed by: EMERGENCY MEDICINE

## 2017-11-16 RX ORDER — DIPHENHYDRAMINE HYDROCHLORIDE 50 MG/ML
25 INJECTION INTRAMUSCULAR; INTRAVENOUS ONCE
Status: DISCONTINUED | OUTPATIENT
Start: 2017-11-16 | End: 2017-11-16 | Stop reason: HOSPADM

## 2017-11-16 RX ORDER — METHYLPREDNISOLONE SODIUM SUCCINATE 125 MG/2ML
125 INJECTION, POWDER, LYOPHILIZED, FOR SOLUTION INTRAMUSCULAR; INTRAVENOUS ONCE
Status: DISCONTINUED | OUTPATIENT
Start: 2017-11-16 | End: 2017-11-16 | Stop reason: HOSPADM

## 2017-11-16 RX ORDER — PREDNISONE 10 MG/1
60 TABLET ORAL DAILY
Qty: 30 TABLET | Refills: 0 | Status: SHIPPED | OUTPATIENT
Start: 2017-11-16 | End: 2017-11-21

## 2017-11-16 RX ORDER — PREDNISONE 20 MG/1
60 TABLET ORAL ONCE
Status: COMPLETED | OUTPATIENT
Start: 2017-11-16 | End: 2017-11-16

## 2017-11-16 RX ORDER — FAMOTIDINE 20 MG/1
20 TABLET, FILM COATED ORAL ONCE
Status: COMPLETED | OUTPATIENT
Start: 2017-11-16 | End: 2017-11-16

## 2017-11-16 RX ADMIN — FAMOTIDINE 20 MG: 20 TABLET, FILM COATED ORAL at 17:59

## 2017-11-16 RX ADMIN — PREDNISONE 60 MG: 20 TABLET ORAL at 17:59

## 2017-11-16 ASSESSMENT — ENCOUNTER SYMPTOMS
COUGH: 0
FACIAL SWELLING: 1
SINUS PAIN: 0
ABDOMINAL PAIN: 0
TROUBLE SWALLOWING: 0

## 2017-11-16 NOTE — ED NOTES
Bed: 28  Expected date: 11/16/17  Expected time:   Means of arrival:   Comments:  66 y/o female, dementia, snet for swollen lips, given benadryl at 2:30pm, full code     Destin Quiroga RN  11/16/17 3767

## 2017-11-16 NOTE — ED PROVIDER NOTES
[facility] ED  eMERGENCY dEPARTMENT eNCOUnter      Pt Name: Leonel Gray  MRN: [id number]  [place] 1945  Date of evaluation: 11/16/2017  Provider: Bonita Guevara MD    [address] [place] [address]       Chief Complaint   Patient presents with    Oral Swelling     swollen lips since 2pm today         HISTORY OF PRESENT ILLNESS   (Location/Symptom, Timing/Onset, Context/Setting, Quality, Duration, Modifying Factors, Severity)  Note limiting factors. Leonel Gray is a 67 y.o. female who presents to the emergency department With lip swelling. Patient is currently at a nursing home. She began complaining of some itching in her lips at about 2 PM.  She was given oral Benadryl. They know noticed that it seemed to swell more. She is not on an ACE inhibitor. She denies any chest pain or shortness of breath. There is no documented exposures. She takes no other medication with known angioedema as a side effect. HPI    Nursing Notes were reviewed. REVIEW OF SYSTEMS    (2-9 systems for level 4, 10 or more for level 5)     Review of Systems   HENT: Positive for facial swelling. Negative for mouth sores, sinus pain and trouble swallowing. Respiratory: Negative for cough. Cardiovascular: Negative for chest pain. Gastrointestinal: Negative for abdominal pain. Genitourinary: Negative for dysuria. Neurological: Negative for dizziness. Except as noted above the remainder of the review of systems was reviewed and negative.        PAST MEDICAL HISTORY     Past Medical History:   Diagnosis Date    Alzheimer disease     Diabetes mellitus ([address]     GERD (gastroesophageal reflux disease)     Hypertension     Osteoarthritis     Retinopathy          SURGICAL HISTORY       Past Surgical History:   Procedure Laterality Date    EYE SURGERY      HYSTERECTOMY      NM EGD TRANSORAL BIOPSY SINGLE/MULTIPLE N/A 1/21/2017    EGD BIOPSY performed by Esther Parks MD at [facility] widely patent. Eyes: Conjunctivae and EOM are normal. Pupils are equal, round, and reactive to light. Right eye exhibits no discharge. Left eye exhibits no discharge. Neck: Normal range of motion. Neck supple. Cardiovascular: Normal rate and regular rhythm. No murmur heard. Pulmonary/Chest: Effort normal and breath sounds normal. No stridor. No respiratory distress. Abdominal: Soft. Bowel sounds are normal. She exhibits no distension. There is no tenderness. Musculoskeletal: Normal range of motion. Neurological: She is alert. No cranial nerve deficit. Skin: Skin is warm and dry. Nursing note and vitals reviewed. DIAGNOSTIC RESULTS     EKG: All EKG's are interpreted by the Emergency Department Physician who either signs or Co-signs this chart in the absence of a cardiologist.        RADIOLOGY:   Non-plain film images such as CT, Ultrasound and MRI are read by the radiologist. Plain radiographic images are visualized and preliminarily interpreted by the emergency physician with the below findings:        Interpretation per the Radiologist below, if available at the time of this note:    No orders to display         ED BEDSIDE ULTRASOUND:   Performed by ED Physician - none    LABS:  Labs Reviewed - No data to display    All other labs were within normal range or not returned as of this dictation. EMERGENCY DEPARTMENT COURSE and DIFFERENTIAL DIAGNOSIS/MDM:   Vitals:    Vitals:    11/16/17 1713 11/16/17 1715   BP:  (!) 142/65   Pulse: 60    Resp: 18    Temp: 98.6 °F (37 °C)    TempSrc: Oral    SpO2: 100%    Weight: 220 lb (99.8 kg)    Height: 5' 5\" (1.651 m)        The patient presents with allergic angioedema. She's had the same before when she was exposed parsley. It did not involve the tongue. She had received Benadryl en route. We were unable to establish IV the access. However, the patient has no evidence of anaphylaxis. She was given prednisone and Pepcid.   Within an hour, the

## 2017-11-17 NOTE — ED NOTES
Patient discharge instructions reviewed with family at this time. Family states understanding of the discharge instructions, need for further follow up, and when to return to the ER for worsening symptoms. Patient taken out to family car via wheelchair, no distress noted at this time.       Emmy Ko RN  11/16/17 4947

## 2018-05-09 ENCOUNTER — APPOINTMENT (OUTPATIENT)
Dept: GENERAL RADIOLOGY | Age: 73
DRG: 720 | End: 2018-05-09
Payer: MEDICAID

## 2018-05-09 ENCOUNTER — APPOINTMENT (OUTPATIENT)
Dept: CT IMAGING | Age: 73
DRG: 720 | End: 2018-05-09
Payer: MEDICAID

## 2018-05-09 ENCOUNTER — HOSPITAL ENCOUNTER (INPATIENT)
Age: 73
LOS: 9 days | Discharge: NURSING, REHAB OR LONG-TERM CARE | DRG: 720 | End: 2018-05-18
Attending: STUDENT IN AN ORGANIZED HEALTH CARE EDUCATION/TRAINING PROGRAM | Admitting: INTERNAL MEDICINE
Payer: MEDICAID

## 2018-05-09 DIAGNOSIS — R00.1 BRADYCARDIA: ICD-10-CM

## 2018-05-09 DIAGNOSIS — T68.XXXA HYPOTHERMIA, INITIAL ENCOUNTER: Primary | ICD-10-CM

## 2018-05-09 DIAGNOSIS — I95.9 HYPOTENSION, UNSPECIFIED HYPOTENSION TYPE: ICD-10-CM

## 2018-05-09 DIAGNOSIS — N30.00 ACUTE CYSTITIS WITHOUT HEMATURIA: ICD-10-CM

## 2018-05-09 PROBLEM — A41.9 SEPSIS (HCC): Status: ACTIVE | Noted: 2018-05-09

## 2018-05-09 LAB
ALBUMIN SERPL-MCNC: 3.6 G/DL (ref 3.9–4.9)
ALP BLD-CCNC: 132 U/L (ref 40–130)
ALT SERPL-CCNC: 47 U/L (ref 0–33)
ANION GAP SERPL CALCULATED.3IONS-SCNC: 14 MEQ/L (ref 7–13)
ANISOCYTOSIS: ABNORMAL
APTT: 32.2 SEC (ref 21.6–35.4)
AST SERPL-CCNC: 23 U/L (ref 0–35)
BACTERIA: ABNORMAL /HPF
BASOPHILS ABSOLUTE: 0 K/UL (ref 0–0.2)
BASOPHILS RELATIVE PERCENT: 0.7 %
BILIRUB SERPL-MCNC: <0.2 MG/DL (ref 0–1.2)
BILIRUBIN URINE: ABNORMAL
BLOOD, URINE: ABNORMAL
BUN BLDV-MCNC: 42 MG/DL (ref 8–23)
C-REACTIVE PROTEIN, HIGH SENSITIVITY: 11.1 MG/L (ref 0–5)
CALCIUM SERPL-MCNC: 8.9 MG/DL (ref 8.6–10.2)
CASTS: ABNORMAL /LPF
CHLORIDE BLD-SCNC: 115 MEQ/L (ref 98–107)
CHP ED QC CHECK: YES
CLARITY: ABNORMAL
CO2: 25 MEQ/L (ref 22–29)
COLOR: YELLOW
CREAT SERPL-MCNC: 2.22 MG/DL (ref 0.5–0.9)
EOSINOPHILS ABSOLUTE: 0 K/UL (ref 0–0.7)
EOSINOPHILS RELATIVE PERCENT: 0.7 %
EPITHELIAL CELLS, UA: ABNORMAL /HPF
GFR AFRICAN AMERICAN: 26.1
GFR NON-AFRICAN AMERICAN: 21.6
GLOBULIN: 3.1 G/DL (ref 2.3–3.5)
GLUCOSE BLD-MCNC: 100 MG/DL
GLUCOSE BLD-MCNC: 100 MG/DL (ref 60–115)
GLUCOSE BLD-MCNC: 99 MG/DL (ref 74–109)
GLUCOSE URINE: NEGATIVE MG/DL
HCT VFR BLD CALC: 40.3 % (ref 37–47)
HEMOGLOBIN: 13.3 G/DL (ref 12–16)
INR BLD: 1
KETONES, URINE: ABNORMAL MG/DL
LACTIC ACID: 1.1 MMOL/L (ref 0.5–2.2)
LACTIC ACID: 1.6 MMOL/L (ref 0.5–2.2)
LEUKOCYTE ESTERASE, URINE: ABNORMAL
LYMPHOCYTES ABSOLUTE: 0.8 K/UL (ref 1–4.8)
LYMPHOCYTES RELATIVE PERCENT: 19.1 %
MACROCYTES: ABNORMAL
MAGNESIUM: 2.9 MG/DL (ref 1.7–2.3)
MCH RBC QN AUTO: 27.4 PG (ref 27–31.3)
MCHC RBC AUTO-ENTMCNC: 33.1 % (ref 33–37)
MCV RBC AUTO: 82.7 FL (ref 82–100)
MONOCYTES ABSOLUTE: 0.2 K/UL (ref 0.2–0.8)
MONOCYTES RELATIVE PERCENT: 4.1 %
NEUTROPHILS ABSOLUTE: 3 K/UL (ref 1.4–6.5)
NEUTROPHILS RELATIVE PERCENT: 75.4 %
NITRITE, URINE: POSITIVE
PDW BLD-RTO: 23.1 % (ref 11.5–14.5)
PERFORMED ON: NORMAL
PH UA: 5 (ref 5–9)
PLATELET # BLD: 28 K/UL (ref 130–400)
PLATELET SLIDE REVIEW: ABNORMAL
POTASSIUM SERPL-SCNC: 4.6 MEQ/L (ref 3.5–5.1)
PRO-BNP: 394 PG/ML
PROTEIN UA: NEGATIVE MG/DL
PROTHROMBIN TIME: 10.6 SEC (ref 9.6–12.3)
RBC # BLD: 4.87 M/UL (ref 4.2–5.4)
RBC UA: ABNORMAL /HPF (ref 0–2)
SODIUM BLD-SCNC: 154 MEQ/L (ref 132–144)
SPECIFIC GRAVITY UA: 1.02 (ref 1–1.03)
TOTAL CK: 48 U/L (ref 0–170)
TOTAL PROTEIN: 6.7 G/DL (ref 6.4–8.1)
TROPONIN: 0.04 NG/ML (ref 0–0.01)
URINE REFLEX TO CULTURE: YES
UROBILINOGEN, URINE: 0.2 E.U./DL
WBC # BLD: 4 K/UL (ref 4.8–10.8)
WBC UA: >100 /HPF (ref 0–5)

## 2018-05-09 PROCEDURE — 84484 ASSAY OF TROPONIN QUANT: CPT

## 2018-05-09 PROCEDURE — 2580000003 HC RX 258

## 2018-05-09 PROCEDURE — 81001 URINALYSIS AUTO W/SCOPE: CPT

## 2018-05-09 PROCEDURE — 82550 ASSAY OF CK (CPK): CPT

## 2018-05-09 PROCEDURE — 87040 BLOOD CULTURE FOR BACTERIA: CPT

## 2018-05-09 PROCEDURE — 85025 COMPLETE CBC W/AUTO DIFF WBC: CPT

## 2018-05-09 PROCEDURE — 83735 ASSAY OF MAGNESIUM: CPT

## 2018-05-09 PROCEDURE — 2500000003 HC RX 250 WO HCPCS: Performed by: NURSE PRACTITIONER

## 2018-05-09 PROCEDURE — 2580000003 HC RX 258: Performed by: INTERNAL MEDICINE

## 2018-05-09 PROCEDURE — 83605 ASSAY OF LACTIC ACID: CPT

## 2018-05-09 PROCEDURE — 83880 ASSAY OF NATRIURETIC PEPTIDE: CPT

## 2018-05-09 PROCEDURE — 71045 X-RAY EXAM CHEST 1 VIEW: CPT

## 2018-05-09 PROCEDURE — 2000000000 HC ICU R&B

## 2018-05-09 PROCEDURE — 96365 THER/PROPH/DIAG IV INF INIT: CPT

## 2018-05-09 PROCEDURE — 6360000002 HC RX W HCPCS: Performed by: INTERNAL MEDICINE

## 2018-05-09 PROCEDURE — 51702 INSERT TEMP BLADDER CATH: CPT

## 2018-05-09 PROCEDURE — 2500000003 HC RX 250 WO HCPCS: Performed by: INTERNAL MEDICINE

## 2018-05-09 PROCEDURE — 70450 CT HEAD/BRAIN W/O DYE: CPT

## 2018-05-09 PROCEDURE — 87186 SC STD MICRODIL/AGAR DIL: CPT

## 2018-05-09 PROCEDURE — 36415 COLL VENOUS BLD VENIPUNCTURE: CPT

## 2018-05-09 PROCEDURE — 87077 CULTURE AEROBIC IDENTIFY: CPT

## 2018-05-09 PROCEDURE — 6360000002 HC RX W HCPCS: Performed by: NURSE PRACTITIONER

## 2018-05-09 PROCEDURE — 93005 ELECTROCARDIOGRAM TRACING: CPT

## 2018-05-09 PROCEDURE — 2580000003 HC RX 258: Performed by: NURSE PRACTITIONER

## 2018-05-09 PROCEDURE — 87086 URINE CULTURE/COLONY COUNT: CPT

## 2018-05-09 PROCEDURE — 85610 PROTHROMBIN TIME: CPT

## 2018-05-09 PROCEDURE — 85730 THROMBOPLASTIN TIME PARTIAL: CPT

## 2018-05-09 PROCEDURE — 80053 COMPREHEN METABOLIC PANEL: CPT

## 2018-05-09 PROCEDURE — 86141 C-REACTIVE PROTEIN HS: CPT

## 2018-05-09 PROCEDURE — 99285 EMERGENCY DEPT VISIT HI MDM: CPT

## 2018-05-09 PROCEDURE — 96375 TX/PRO/DX INJ NEW DRUG ADDON: CPT

## 2018-05-09 RX ORDER — 0.9 % SODIUM CHLORIDE 0.9 %
1000 INTRAVENOUS SOLUTION INTRAVENOUS ONCE
Status: COMPLETED | OUTPATIENT
Start: 2018-05-09 | End: 2018-05-09

## 2018-05-09 RX ORDER — 0.9 % SODIUM CHLORIDE 0.9 %
10 VIAL (ML) INJECTION DAILY
Status: DISCONTINUED | OUTPATIENT
Start: 2018-05-10 | End: 2018-05-18 | Stop reason: HOSPADM

## 2018-05-09 RX ORDER — ATROPINE SULFATE 0.1 MG/ML
1 INJECTION INTRAVENOUS ONCE
Status: COMPLETED | OUTPATIENT
Start: 2018-05-09 | End: 2018-05-09

## 2018-05-09 RX ORDER — LANOLIN ALCOHOL/MO/W.PET/CERES
500 CREAM (GRAM) TOPICAL NIGHTLY
Status: DISPENSED | OUTPATIENT
Start: 2018-05-09 | End: 2018-05-10

## 2018-05-09 RX ORDER — FERROUS SULFATE 325(65) MG
325 TABLET ORAL
Status: DISCONTINUED | OUTPATIENT
Start: 2018-05-10 | End: 2018-05-10

## 2018-05-09 RX ORDER — DEXTROSE, SODIUM CHLORIDE, SODIUM LACTATE, POTASSIUM CHLORIDE, AND CALCIUM CHLORIDE 5; .6; .31; .03; .02 G/100ML; G/100ML; G/100ML; G/100ML; G/100ML
INJECTION, SOLUTION INTRAVENOUS CONTINUOUS
Status: DISCONTINUED | OUTPATIENT
Start: 2018-05-09 | End: 2018-05-10

## 2018-05-09 RX ORDER — SODIUM CHLORIDE 0.9 % (FLUSH) 0.9 %
10 SYRINGE (ML) INJECTION PRN
Status: DISCONTINUED | OUTPATIENT
Start: 2018-05-09 | End: 2018-05-14 | Stop reason: SDUPTHER

## 2018-05-09 RX ORDER — PANTOPRAZOLE SODIUM 40 MG/10ML
40 INJECTION, POWDER, LYOPHILIZED, FOR SOLUTION INTRAVENOUS DAILY
Status: DISCONTINUED | OUTPATIENT
Start: 2018-05-10 | End: 2018-05-18 | Stop reason: HOSPADM

## 2018-05-09 RX ORDER — PHENOL 1.4 %
1 AEROSOL, SPRAY (ML) MUCOUS MEMBRANE DAILY
COMMUNITY
End: 2020-08-07

## 2018-05-09 RX ORDER — FERROUS SULFATE 325(65) MG
325 TABLET ORAL
COMMUNITY
End: 2020-08-07

## 2018-05-09 RX ORDER — SODIUM CHLORIDE 9 MG/ML
INJECTION, SOLUTION INTRAVENOUS
Status: COMPLETED
Start: 2018-05-09 | End: 2018-05-09

## 2018-05-09 RX ORDER — PANTOPRAZOLE SODIUM 40 MG/1
40 TABLET, DELAYED RELEASE ORAL DAILY
Status: DISCONTINUED | OUTPATIENT
Start: 2018-05-10 | End: 2018-05-09

## 2018-05-09 RX ORDER — DIVALPROEX SODIUM 125 MG/1
125 CAPSULE, COATED PELLETS ORAL DAILY
Status: DISCONTINUED | OUTPATIENT
Start: 2018-05-10 | End: 2018-05-09

## 2018-05-09 RX ORDER — CALCIUM CARBONATE 500(1250)
1 TABLET ORAL DAILY
Status: DISCONTINUED | OUTPATIENT
Start: 2018-05-10 | End: 2018-05-18 | Stop reason: HOSPADM

## 2018-05-09 RX ORDER — 0.9 % SODIUM CHLORIDE 0.9 %
1000 INTRAVENOUS SOLUTION INTRAVENOUS ONCE
Status: DISCONTINUED | OUTPATIENT
Start: 2018-05-09 | End: 2018-05-14

## 2018-05-09 RX ORDER — ONDANSETRON 2 MG/ML
4 INJECTION INTRAMUSCULAR; INTRAVENOUS EVERY 6 HOURS PRN
Status: DISCONTINUED | OUTPATIENT
Start: 2018-05-09 | End: 2018-05-18 | Stop reason: HOSPADM

## 2018-05-09 RX ORDER — DILTIAZEM HYDROCHLORIDE 60 MG/1
60 TABLET, FILM COATED ORAL 2 TIMES DAILY
Status: DISCONTINUED | OUTPATIENT
Start: 2018-05-09 | End: 2018-05-10

## 2018-05-09 RX ORDER — SODIUM CHLORIDE 0.9 % (FLUSH) 0.9 %
10 SYRINGE (ML) INJECTION EVERY 12 HOURS SCHEDULED
Status: DISCONTINUED | OUTPATIENT
Start: 2018-05-09 | End: 2018-05-14 | Stop reason: SDUPTHER

## 2018-05-09 RX ORDER — FUROSEMIDE 20 MG/1
20 TABLET ORAL DAILY
Status: ON HOLD | COMMUNITY
End: 2018-05-18 | Stop reason: HOSPADM

## 2018-05-09 RX ADMIN — NOREPINEPHRINE BITARTRATE 2 MCG/MIN: 1 INJECTION INTRAVENOUS at 19:17

## 2018-05-09 RX ADMIN — SODIUM CHLORIDE 1000 ML: 9 INJECTION, SOLUTION INTRAVENOUS at 18:22

## 2018-05-09 RX ADMIN — SODIUM CHLORIDE 1000 ML: 9 INJECTION, SOLUTION INTRAVENOUS at 17:39

## 2018-05-09 RX ADMIN — HYDROCORTISONE SODIUM SUCCINATE 100 MG: 100 INJECTION, POWDER, FOR SOLUTION INTRAMUSCULAR; INTRAVENOUS at 22:28

## 2018-05-09 RX ADMIN — ATROPINE SULFATE 1 MG: 0.1 INJECTION PARENTERAL at 18:22

## 2018-05-09 RX ADMIN — TAZOBACTAM SODIUM AND PIPERACILLIN SODIUM 3.38 G: 375; 3 INJECTION, SOLUTION INTRAVENOUS at 22:28

## 2018-05-09 RX ADMIN — SODIUM CHLORIDE, SODIUM LACTATE, POTASSIUM CHLORIDE, CALCIUM CHLORIDE AND DEXTROSE MONOHYDRATE: 5; 600; 310; 30; 20 INJECTION, SOLUTION INTRAVENOUS at 22:29

## 2018-05-09 RX ADMIN — Medication 10 ML: at 22:29

## 2018-05-09 RX ADMIN — TAZOBACTAM SODIUM AND PIPERACILLIN SODIUM 3.38 G: 375; 3 INJECTION, SOLUTION INTRAVENOUS at 18:22

## 2018-05-09 RX ADMIN — SODIUM CHLORIDE 1000 ML: 9 INJECTION, SOLUTION INTRAVENOUS at 19:18

## 2018-05-10 ENCOUNTER — APPOINTMENT (OUTPATIENT)
Dept: MRI IMAGING | Age: 73
DRG: 720 | End: 2018-05-10
Payer: MEDICAID

## 2018-05-10 LAB
ALBUMIN SERPL-MCNC: 2.6 G/DL (ref 3.9–4.9)
ALBUMIN SERPL-MCNC: 2.9 G/DL (ref 3.9–4.9)
ANION GAP SERPL CALCULATED.3IONS-SCNC: 11 MEQ/L (ref 7–13)
ANION GAP SERPL CALCULATED.3IONS-SCNC: 12 MEQ/L (ref 7–13)
ANION GAP SERPL CALCULATED.3IONS-SCNC: 9 MEQ/L (ref 7–13)
ANION GAP SERPL CALCULATED.3IONS-SCNC: 9 MEQ/L (ref 7–13)
BASE EXCESS VENOUS: -2 (ref -3–3)
BUN BLDV-MCNC: 35 MG/DL (ref 8–23)
BUN BLDV-MCNC: 35 MG/DL (ref 8–23)
BUN BLDV-MCNC: 36 MG/DL (ref 8–23)
BUN BLDV-MCNC: 36 MG/DL (ref 8–23)
CALCIUM IONIZED: 1.18 MMOL/L (ref 1.12–1.32)
CALCIUM SERPL-MCNC: 8.2 MG/DL (ref 8.6–10.2)
CALCIUM SERPL-MCNC: 8.3 MG/DL (ref 8.6–10.2)
CHLORIDE BLD-SCNC: 120 MEQ/L (ref 98–107)
CHLORIDE BLD-SCNC: 120 MEQ/L (ref 98–107)
CHLORIDE BLD-SCNC: 121 MEQ/L (ref 98–107)
CHLORIDE BLD-SCNC: 122 MEQ/L (ref 98–107)
CO2: 19 MEQ/L (ref 22–29)
CO2: 20 MEQ/L (ref 22–29)
CO2: 23 MEQ/L (ref 22–29)
CO2: 23 MEQ/L (ref 22–29)
CREAT SERPL-MCNC: 1.9 MG/DL (ref 0.5–0.9)
CREAT SERPL-MCNC: 1.92 MG/DL (ref 0.5–0.9)
CREAT SERPL-MCNC: 1.96 MG/DL (ref 0.5–0.9)
CREAT SERPL-MCNC: 2 MG/DL (ref 0.5–0.9)
CREATININE URINE: 110.1 MG/DL
FIBRINOGEN: 421.4 MG/DL (ref 200–400)
GFR AFRICAN AMERICAN: 29
GFR AFRICAN AMERICAN: 29.5
GFR AFRICAN AMERICAN: 30.1
GFR AFRICAN AMERICAN: 30.9
GFR AFRICAN AMERICAN: 31.2
GFR NON-AFRICAN AMERICAN: 24
GFR NON-AFRICAN AMERICAN: 24.3
GFR NON-AFRICAN AMERICAN: 24.9
GFR NON-AFRICAN AMERICAN: 25.5
GFR NON-AFRICAN AMERICAN: 25.8
GLUCOSE BLD-MCNC: 127 MG/DL (ref 74–109)
GLUCOSE BLD-MCNC: 131 MG/DL (ref 60–115)
GLUCOSE BLD-MCNC: 132 MG/DL (ref 74–109)
GLUCOSE BLD-MCNC: 132 MG/DL (ref 74–109)
GLUCOSE BLD-MCNC: 134 MG/DL (ref 74–109)
HCO3 VENOUS: 21 MMOL/L (ref 23–29)
HCT VFR BLD CALC: 34.1 % (ref 37–47)
HEMOGLOBIN: 11.3 GM/DL (ref 12–16)
HEMOGLOBIN: 11.7 G/DL (ref 12–16)
LACTATE: 1.73 MMOL/L (ref 0.4–2)
MCH RBC QN AUTO: 28.4 PG (ref 27–31.3)
MCHC RBC AUTO-ENTMCNC: 34.2 % (ref 33–37)
MCV RBC AUTO: 83.1 FL (ref 82–100)
O2 SAT, VEN: 100 %
PCO2, VEN: 27.6 MM HG (ref 40–50)
PDW BLD-RTO: 22.7 % (ref 11.5–14.5)
PERFORMED ON: ABNORMAL
PH VENOUS: 7.49 (ref 7.35–7.45)
PHOSPHORUS: 2.5 MG/DL (ref 2.5–4.5)
PHOSPHORUS: 3 MG/DL (ref 2.5–4.5)
PLATELET # BLD: 33 K/UL (ref 130–400)
PLATELET SLIDE REVIEW: ABNORMAL
PO2, VEN: 191 MM HG
POC CHLORIDE: 127 MEQ/L (ref 99–110)
POC CREATININE: 2 MG/DL (ref 0.6–1.2)
POC HEMATOCRIT: 33 % (ref 36–48)
POC POTASSIUM: 4.5 MEQ/L (ref 3.5–5.1)
POC SAMPLE TYPE: ABNORMAL
POC SODIUM: 157 MEQ/L (ref 136–145)
POTASSIUM SERPL-SCNC: 4.2 MEQ/L (ref 3.5–5.1)
POTASSIUM SERPL-SCNC: 4.2 MEQ/L (ref 3.5–5.1)
POTASSIUM SERPL-SCNC: 5.4 MEQ/L (ref 3.5–5.1)
POTASSIUM SERPL-SCNC: 5.5 MEQ/L (ref 3.5–5.1)
RBC # BLD: 4.1 M/UL (ref 4.2–5.4)
SODIUM BLD-SCNC: 152 MEQ/L (ref 132–144)
SODIUM BLD-SCNC: 153 MEQ/L (ref 132–144)
SODIUM URINE: 38 MEQ/L
TCO2 CALC VENOUS: 22 MMOL/L
TROPONIN: 0.05 NG/ML (ref 0–0.01)
TSH REFLEX: 1.35 UIU/ML (ref 0.27–4.2)
WBC # BLD: 10.1 K/UL (ref 4.8–10.8)

## 2018-05-10 PROCEDURE — 82803 BLOOD GASES ANY COMBINATION: CPT

## 2018-05-10 PROCEDURE — 2580000003 HC RX 258: Performed by: INTERNAL MEDICINE

## 2018-05-10 PROCEDURE — 95816 EEG AWAKE AND DROWSY: CPT

## 2018-05-10 PROCEDURE — 99291 CRITICAL CARE FIRST HOUR: CPT | Performed by: INTERNAL MEDICINE

## 2018-05-10 PROCEDURE — 85384 FIBRINOGEN ACTIVITY: CPT

## 2018-05-10 PROCEDURE — 6360000002 HC RX W HCPCS: Performed by: INTERNAL MEDICINE

## 2018-05-10 PROCEDURE — 2500000003 HC RX 250 WO HCPCS: Performed by: INTERNAL MEDICINE

## 2018-05-10 PROCEDURE — 83605 ASSAY OF LACTIC ACID: CPT

## 2018-05-10 PROCEDURE — 84443 ASSAY THYROID STIM HORMONE: CPT

## 2018-05-10 PROCEDURE — 70551 MRI BRAIN STEM W/O DYE: CPT

## 2018-05-10 PROCEDURE — 80069 RENAL FUNCTION PANEL: CPT

## 2018-05-10 PROCEDURE — 2000000000 HC ICU R&B

## 2018-05-10 PROCEDURE — 85027 COMPLETE CBC AUTOMATED: CPT

## 2018-05-10 PROCEDURE — 84484 ASSAY OF TROPONIN QUANT: CPT

## 2018-05-10 PROCEDURE — 82565 ASSAY OF CREATININE: CPT

## 2018-05-10 PROCEDURE — 84132 ASSAY OF SERUM POTASSIUM: CPT

## 2018-05-10 PROCEDURE — 85014 HEMATOCRIT: CPT

## 2018-05-10 PROCEDURE — 82435 ASSAY OF BLOOD CHLORIDE: CPT

## 2018-05-10 PROCEDURE — 84300 ASSAY OF URINE SODIUM: CPT

## 2018-05-10 PROCEDURE — 36415 COLL VENOUS BLD VENIPUNCTURE: CPT

## 2018-05-10 PROCEDURE — C9113 INJ PANTOPRAZOLE SODIUM, VIA: HCPCS | Performed by: INTERNAL MEDICINE

## 2018-05-10 PROCEDURE — 82330 ASSAY OF CALCIUM: CPT

## 2018-05-10 PROCEDURE — 82570 ASSAY OF URINE CREATININE: CPT

## 2018-05-10 RX ORDER — DEXTROSE MONOHYDRATE 50 MG/ML
INJECTION, SOLUTION INTRAVENOUS CONTINUOUS
Status: DISCONTINUED | OUTPATIENT
Start: 2018-05-10 | End: 2018-05-12

## 2018-05-10 RX ORDER — HYDRALAZINE HYDROCHLORIDE 20 MG/ML
10 INJECTION INTRAMUSCULAR; INTRAVENOUS EVERY 6 HOURS
Status: DISCONTINUED | OUTPATIENT
Start: 2018-05-10 | End: 2018-05-11

## 2018-05-10 RX ORDER — HYDRALAZINE HYDROCHLORIDE 20 MG/ML
5 INJECTION INTRAMUSCULAR; INTRAVENOUS EVERY 6 HOURS PRN
Status: DISCONTINUED | OUTPATIENT
Start: 2018-05-10 | End: 2018-05-10

## 2018-05-10 RX ORDER — DEXTROSE AND SODIUM CHLORIDE 5; .45 G/100ML; G/100ML
INJECTION, SOLUTION INTRAVENOUS CONTINUOUS
Status: DISCONTINUED | OUTPATIENT
Start: 2018-05-10 | End: 2018-05-10

## 2018-05-10 RX ADMIN — SODIUM CHLORIDE, SODIUM LACTATE, POTASSIUM CHLORIDE, CALCIUM CHLORIDE AND DEXTROSE MONOHYDRATE: 5; 600; 310; 30; 20 INJECTION, SOLUTION INTRAVENOUS at 08:14

## 2018-05-10 RX ADMIN — HYDROCORTISONE SODIUM SUCCINATE 100 MG: 100 INJECTION, POWDER, FOR SOLUTION INTRAMUSCULAR; INTRAVENOUS at 06:20

## 2018-05-10 RX ADMIN — DEXTROSE MONOHYDRATE: 50 INJECTION, SOLUTION INTRAVENOUS at 16:06

## 2018-05-10 RX ADMIN — TAZOBACTAM SODIUM AND PIPERACILLIN SODIUM 3.38 G: 375; 3 INJECTION, SOLUTION INTRAVENOUS at 06:20

## 2018-05-10 RX ADMIN — TAZOBACTAM SODIUM AND PIPERACILLIN SODIUM 3.38 G: 375; 3 INJECTION, SOLUTION INTRAVENOUS at 16:18

## 2018-05-10 RX ADMIN — DEXTROSE AND SODIUM CHLORIDE: 5; 450 INJECTION, SOLUTION INTRAVENOUS at 11:01

## 2018-05-10 RX ADMIN — PANTOPRAZOLE SODIUM 40 MG: 40 INJECTION, POWDER, FOR SOLUTION INTRAVENOUS at 09:55

## 2018-05-11 PROBLEM — E87.0 ACUTE HYPERNATREMIA: Status: ACTIVE | Noted: 2018-05-11

## 2018-05-11 PROBLEM — I49.5 SSS (SICK SINUS SYNDROME) (HCC): Status: ACTIVE | Noted: 2018-05-11

## 2018-05-11 PROBLEM — D69.6 THROMBOCYTOPENIA (HCC): Status: ACTIVE | Noted: 2018-05-11

## 2018-05-11 PROBLEM — R00.1 BRADYCARDIA: Status: ACTIVE | Noted: 2018-05-11

## 2018-05-11 LAB
ALBUMIN SERPL-MCNC: 2.6 G/DL (ref 3.9–4.9)
ALP BLD-CCNC: 90 U/L (ref 40–130)
ALT SERPL-CCNC: 26 U/L (ref 0–33)
ANION GAP SERPL CALCULATED.3IONS-SCNC: 11 MEQ/L (ref 7–13)
ANION GAP SERPL CALCULATED.3IONS-SCNC: 11 MEQ/L (ref 7–13)
ANION GAP SERPL CALCULATED.3IONS-SCNC: 13 MEQ/L (ref 7–13)
ANION GAP SERPL CALCULATED.3IONS-SCNC: 8 MEQ/L (ref 7–13)
AST SERPL-CCNC: 13 U/L (ref 0–35)
BILIRUB SERPL-MCNC: 0.4 MG/DL (ref 0–1.2)
BUN BLDV-MCNC: 24 MG/DL (ref 8–23)
BUN BLDV-MCNC: 28 MG/DL (ref 8–23)
BUN BLDV-MCNC: 30 MG/DL (ref 8–23)
BUN BLDV-MCNC: 32 MG/DL (ref 8–23)
C-REACTIVE PROTEIN, HIGH SENSITIVITY: 12.1 MG/L (ref 0–5)
CALCIUM SERPL-MCNC: 7.7 MG/DL (ref 8.6–10.2)
CALCIUM SERPL-MCNC: 7.7 MG/DL (ref 8.6–10.2)
CALCIUM SERPL-MCNC: 8.1 MG/DL (ref 8.6–10.2)
CALCIUM SERPL-MCNC: 8.3 MG/DL (ref 8.6–10.2)
CHLORIDE BLD-SCNC: 115 MEQ/L (ref 98–107)
CHLORIDE BLD-SCNC: 117 MEQ/L (ref 98–107)
CHLORIDE BLD-SCNC: 118 MEQ/L (ref 98–107)
CHLORIDE BLD-SCNC: 119 MEQ/L (ref 98–107)
CO2: 21 MEQ/L (ref 22–29)
CO2: 22 MEQ/L (ref 22–29)
CREAT SERPL-MCNC: 1.46 MG/DL (ref 0.5–0.9)
CREAT SERPL-MCNC: 1.48 MG/DL (ref 0.5–0.9)
CREAT SERPL-MCNC: 1.55 MG/DL (ref 0.5–0.9)
CREAT SERPL-MCNC: 1.86 MG/DL (ref 0.5–0.9)
GFR AFRICAN AMERICAN: 32
GFR AFRICAN AMERICAN: 39.5
GFR AFRICAN AMERICAN: 41.7
GFR AFRICAN AMERICAN: 42.3
GFR NON-AFRICAN AMERICAN: 26.5
GFR NON-AFRICAN AMERICAN: 32.7
GFR NON-AFRICAN AMERICAN: 34.5
GFR NON-AFRICAN AMERICAN: 35
GLOBULIN: 2.2 G/DL (ref 2.3–3.5)
GLUCOSE BLD-MCNC: 117 MG/DL (ref 74–109)
GLUCOSE BLD-MCNC: 89 MG/DL (ref 74–109)
GLUCOSE BLD-MCNC: 91 MG/DL (ref 74–109)
GLUCOSE BLD-MCNC: 94 MG/DL (ref 74–109)
GLUCOSE BLD-MCNC: 96 MG/DL (ref 60–115)
GLUCOSE BLD-MCNC: 97 MG/DL (ref 60–115)
HCT VFR BLD CALC: 30 % (ref 37–47)
HEMOGLOBIN: 10 G/DL (ref 12–16)
MAGNESIUM: 2.2 MG/DL (ref 1.7–2.3)
MCH RBC QN AUTO: 27.4 PG (ref 27–31.3)
MCHC RBC AUTO-ENTMCNC: 33.4 % (ref 33–37)
MCV RBC AUTO: 82.1 FL (ref 82–100)
PDW BLD-RTO: 21.6 % (ref 11.5–14.5)
PERFORMED ON: NORMAL
PERFORMED ON: NORMAL
PHOSPHORUS: 2.1 MG/DL (ref 2.5–4.5)
PLATELET # BLD: 18 K/UL (ref 130–400)
PLATELET SLIDE REVIEW: ABNORMAL
POTASSIUM SERPL-SCNC: 3.4 MEQ/L (ref 3.5–5.1)
POTASSIUM SERPL-SCNC: 3.7 MEQ/L (ref 3.5–5.1)
POTASSIUM SERPL-SCNC: 3.8 MEQ/L (ref 3.5–5.1)
POTASSIUM SERPL-SCNC: 4.4 MEQ/L (ref 3.5–5.1)
RBC # BLD: 3.65 M/UL (ref 4.2–5.4)
SEDIMENTATION RATE, ERYTHROCYTE: 39 MM (ref 0–30)
SODIUM BLD-SCNC: 145 MEQ/L (ref 132–144)
SODIUM BLD-SCNC: 151 MEQ/L (ref 132–144)
SODIUM BLD-SCNC: 151 MEQ/L (ref 132–144)
SODIUM BLD-SCNC: 152 MEQ/L (ref 132–144)
TOTAL PROTEIN: 4.8 G/DL (ref 6.4–8.1)
TROPONIN: 0.04 NG/ML (ref 0–0.01)
WBC # BLD: 4.7 K/UL (ref 4.8–10.8)

## 2018-05-11 PROCEDURE — G8997 SWALLOW GOAL STATUS: HCPCS

## 2018-05-11 PROCEDURE — 2000000000 HC ICU R&B

## 2018-05-11 PROCEDURE — 86141 C-REACTIVE PROTEIN HS: CPT

## 2018-05-11 PROCEDURE — 2500000003 HC RX 250 WO HCPCS: Performed by: INTERNAL MEDICINE

## 2018-05-11 PROCEDURE — 83735 ASSAY OF MAGNESIUM: CPT

## 2018-05-11 PROCEDURE — 99291 CRITICAL CARE FIRST HOUR: CPT | Performed by: INTERNAL MEDICINE

## 2018-05-11 PROCEDURE — 84100 ASSAY OF PHOSPHORUS: CPT

## 2018-05-11 PROCEDURE — 80053 COMPREHEN METABOLIC PANEL: CPT

## 2018-05-11 PROCEDURE — 85652 RBC SED RATE AUTOMATED: CPT

## 2018-05-11 PROCEDURE — 36415 COLL VENOUS BLD VENIPUNCTURE: CPT

## 2018-05-11 PROCEDURE — 2580000003 HC RX 258: Performed by: INTERNAL MEDICINE

## 2018-05-11 PROCEDURE — 92610 EVALUATE SWALLOWING FUNCTION: CPT

## 2018-05-11 PROCEDURE — 85027 COMPLETE CBC AUTOMATED: CPT

## 2018-05-11 PROCEDURE — 84484 ASSAY OF TROPONIN QUANT: CPT

## 2018-05-11 PROCEDURE — C9113 INJ PANTOPRAZOLE SODIUM, VIA: HCPCS | Performed by: INTERNAL MEDICINE

## 2018-05-11 PROCEDURE — 6360000002 HC RX W HCPCS: Performed by: INTERNAL MEDICINE

## 2018-05-11 PROCEDURE — G8996 SWALLOW CURRENT STATUS: HCPCS

## 2018-05-11 PROCEDURE — 93010 ELECTROCARDIOGRAM REPORT: CPT | Performed by: INTERNAL MEDICINE

## 2018-05-11 RX ORDER — POTASSIUM CHLORIDE 7.45 MG/ML
10 INJECTION INTRAVENOUS
Status: COMPLETED | OUTPATIENT
Start: 2018-05-11 | End: 2018-05-11

## 2018-05-11 RX ORDER — HYDRALAZINE HYDROCHLORIDE 20 MG/ML
10 INJECTION INTRAMUSCULAR; INTRAVENOUS EVERY 6 HOURS PRN
Status: DISCONTINUED | OUTPATIENT
Start: 2018-05-11 | End: 2018-05-18 | Stop reason: HOSPADM

## 2018-05-11 RX ORDER — LANOLIN ALCOHOL/MO/W.PET/CERES
6 CREAM (GRAM) TOPICAL NIGHTLY
Status: ON HOLD | COMMUNITY
End: 2018-05-17 | Stop reason: HOSPADM

## 2018-05-11 RX ORDER — SUCRALFATE ORAL 1 G/10ML
1 SUSPENSION ORAL 3 TIMES DAILY PRN
COMMUNITY

## 2018-05-11 RX ORDER — NICOTINE POLACRILEX 4 MG
15 LOZENGE BUCCAL PRN
Status: DISCONTINUED | OUTPATIENT
Start: 2018-05-11 | End: 2018-05-18 | Stop reason: HOSPADM

## 2018-05-11 RX ORDER — DEXTROSE MONOHYDRATE 50 MG/ML
100 INJECTION, SOLUTION INTRAVENOUS PRN
Status: DISCONTINUED | OUTPATIENT
Start: 2018-05-11 | End: 2018-05-18 | Stop reason: HOSPADM

## 2018-05-11 RX ORDER — DEXTROSE MONOHYDRATE 25 G/50ML
12.5 INJECTION, SOLUTION INTRAVENOUS PRN
Status: DISCONTINUED | OUTPATIENT
Start: 2018-05-11 | End: 2018-05-18 | Stop reason: HOSPADM

## 2018-05-11 RX ORDER — MAGNESIUM OXIDE 400 MG/1
400 TABLET ORAL DAILY
Status: ON HOLD | COMMUNITY
End: 2018-05-18 | Stop reason: HOSPADM

## 2018-05-11 RX ORDER — BISACODYL 10 MG
10 SUPPOSITORY, RECTAL RECTAL DAILY PRN
Status: DISCONTINUED | OUTPATIENT
Start: 2018-05-11 | End: 2018-05-18 | Stop reason: HOSPADM

## 2018-05-11 RX ADMIN — Medication 10 MEQ: at 13:43

## 2018-05-11 RX ADMIN — PANTOPRAZOLE SODIUM 40 MG: 40 INJECTION, POWDER, FOR SOLUTION INTRAVENOUS at 08:33

## 2018-05-11 RX ADMIN — MEROPENEM: 1 INJECTION, POWDER, FOR SOLUTION INTRAVENOUS at 13:44

## 2018-05-11 RX ADMIN — TAZOBACTAM SODIUM AND PIPERACILLIN SODIUM 3.38 G: 375; 3 INJECTION, SOLUTION INTRAVENOUS at 00:37

## 2018-05-11 RX ADMIN — Medication 10 MEQ: at 16:11

## 2018-05-11 RX ADMIN — DEXTROSE MONOHYDRATE: 50 INJECTION, SOLUTION INTRAVENOUS at 19:59

## 2018-05-11 RX ADMIN — Medication 10 MEQ: at 12:14

## 2018-05-11 RX ADMIN — Medication 10 MEQ: at 10:23

## 2018-05-11 RX ADMIN — DEXTROSE MONOHYDRATE: 50 INJECTION, SOLUTION INTRAVENOUS at 13:52

## 2018-05-11 RX ADMIN — DEXTROSE MONOHYDRATE: 50 INJECTION, SOLUTION INTRAVENOUS at 07:25

## 2018-05-11 RX ADMIN — Medication 10 ML: at 08:43

## 2018-05-11 RX ADMIN — DEXTROSE MONOHYDRATE: 50 INJECTION, SOLUTION INTRAVENOUS at 00:35

## 2018-05-11 RX ADMIN — TAZOBACTAM SODIUM AND PIPERACILLIN SODIUM 3.38 G: 375; 3 INJECTION, SOLUTION INTRAVENOUS at 08:33

## 2018-05-11 ASSESSMENT — PAIN SCALES - PAIN ASSESSMENT IN ADVANCED DEMENTIA (PAINAD)
BREATHING: 0
BODYLANGUAGE: 0
NEGVOCALIZATION: 0
NEGVOCALIZATION: 0
CONSOLABILITY: 0
FACIALEXPRESSION: 0
NEGVOCALIZATION: 0
FACIALEXPRESSION: 0
CONSOLABILITY: 0
BODYLANGUAGE: 0
TOTALSCORE: 0
NEGVOCALIZATION: 0
TOTALSCORE: 0
TOTALSCORE: 0
BODYLANGUAGE: 0
BODYLANGUAGE: 0
FACIALEXPRESSION: 0
CONSOLABILITY: 0
CONSOLABILITY: 0
BREATHING: 0
FACIALEXPRESSION: 0
BREATHING: 0
BODYLANGUAGE: 0
TOTALSCORE: 0
NEGVOCALIZATION: 0
FACIALEXPRESSION: 0
BODYLANGUAGE: 0
BREATHING: 0
BREATHING: 0
BODYLANGUAGE: 0
NEGVOCALIZATION: 0
NEGVOCALIZATION: 0
CONSOLABILITY: 0
BREATHING: 0
TOTALSCORE: 0
NEGVOCALIZATION: 0
TOTALSCORE: 0
BODYLANGUAGE: 0
NEGVOCALIZATION: 0
NEGVOCALIZATION: 0
FACIALEXPRESSION: 0
NEGVOCALIZATION: 0
BREATHING: 0
FACIALEXPRESSION: 0
TOTALSCORE: 0
CONSOLABILITY: 0
BREATHING: 0
BREATHING: 0
TOTALSCORE: 0
NEGVOCALIZATION: 0
BODYLANGUAGE: 0
TOTALSCORE: 0
NEGVOCALIZATION: 0
FACIALEXPRESSION: 0
BODYLANGUAGE: 0
CONSOLABILITY: 0
FACIALEXPRESSION: 0
BODYLANGUAGE: 0
TOTALSCORE: 0
BODYLANGUAGE: 0
CONSOLABILITY: 0
FACIALEXPRESSION: 0
NEGVOCALIZATION: 0
FACIALEXPRESSION: 0
BREATHING: 0
BREATHING: 0
CONSOLABILITY: 0
NEGVOCALIZATION: 0
BODYLANGUAGE: 0
CONSOLABILITY: 0
CONSOLABILITY: 0
BREATHING: 0
FACIALEXPRESSION: 0
BREATHING: 0
TOTALSCORE: 0
CONSOLABILITY: 0
CONSOLABILITY: 0
BODYLANGUAGE: 0
NEGVOCALIZATION: 0
NEGVOCALIZATION: 0
BREATHING: 0
BODYLANGUAGE: 0
NEGVOCALIZATION: 0
FACIALEXPRESSION: 0
CONSOLABILITY: 0
FACIALEXPRESSION: 0
CONSOLABILITY: 0
CONSOLABILITY: 0
TOTALSCORE: 0
BODYLANGUAGE: 0
BREATHING: 0
FACIALEXPRESSION: 0
CONSOLABILITY: 0
TOTALSCORE: 0
NEGVOCALIZATION: 0
TOTALSCORE: 0
BODYLANGUAGE: 0
TOTALSCORE: 0
TOTALSCORE: 0
CONSOLABILITY: 0
BODYLANGUAGE: 0
FACIALEXPRESSION: 0
BODYLANGUAGE: 0
TOTALSCORE: 0
TOTALSCORE: 0
CONSOLABILITY: 0
FACIALEXPRESSION: 0
FACIALEXPRESSION: 0
TOTALSCORE: 0
FACIALEXPRESSION: 0

## 2018-05-12 ENCOUNTER — APPOINTMENT (OUTPATIENT)
Dept: GENERAL RADIOLOGY | Age: 73
DRG: 720 | End: 2018-05-12
Payer: MEDICAID

## 2018-05-12 LAB
ALBUMIN SERPL-MCNC: 2.6 G/DL (ref 3.9–4.9)
ALP BLD-CCNC: 95 U/L (ref 40–130)
ALT SERPL-CCNC: 23 U/L (ref 0–33)
ANION GAP SERPL CALCULATED.3IONS-SCNC: 10 MEQ/L (ref 7–13)
ANION GAP SERPL CALCULATED.3IONS-SCNC: 10 MEQ/L (ref 7–13)
ANION GAP SERPL CALCULATED.3IONS-SCNC: 9 MEQ/L (ref 7–13)
ANION GAP SERPL CALCULATED.3IONS-SCNC: 9 MEQ/L (ref 7–13)
AST SERPL-CCNC: 13 U/L (ref 0–35)
BILIRUB SERPL-MCNC: 0.4 MG/DL (ref 0–1.2)
BUN BLDV-MCNC: 17 MG/DL (ref 8–23)
BUN BLDV-MCNC: 18 MG/DL (ref 8–23)
BUN BLDV-MCNC: 19 MG/DL (ref 8–23)
BUN BLDV-MCNC: 22 MG/DL (ref 8–23)
C-REACTIVE PROTEIN, HIGH SENSITIVITY: 13.2 MG/L (ref 0–5)
CALCIUM SERPL-MCNC: 8 MG/DL (ref 8.6–10.2)
CALCIUM SERPL-MCNC: 8 MG/DL (ref 8.6–10.2)
CALCIUM SERPL-MCNC: 8.4 MG/DL (ref 8.6–10.2)
CALCIUM SERPL-MCNC: 8.6 MG/DL (ref 8.6–10.2)
CHLORIDE BLD-SCNC: 114 MEQ/L (ref 98–107)
CHLORIDE BLD-SCNC: 116 MEQ/L (ref 98–107)
CO2: 23 MEQ/L (ref 22–29)
CO2: 24 MEQ/L (ref 22–29)
CREAT SERPL-MCNC: 0.95 MG/DL (ref 0.5–0.9)
CREAT SERPL-MCNC: 1.13 MG/DL (ref 0.5–0.9)
CREAT SERPL-MCNC: 1.15 MG/DL (ref 0.5–0.9)
CREAT SERPL-MCNC: 1.31 MG/DL (ref 0.5–0.9)
EKG ATRIAL RATE: 38 BPM
EKG ATRIAL RATE: 60 BPM
EKG ATRIAL RATE: 60 BPM
EKG P AXIS: 30 DEGREES
EKG P AXIS: 51 DEGREES
EKG P AXIS: 84 DEGREES
EKG P-R INTERVAL: 282 MS
EKG P-R INTERVAL: 282 MS
EKG P-R INTERVAL: 330 MS
EKG Q-T INTERVAL: 388 MS
EKG Q-T INTERVAL: 392 MS
EKG Q-T INTERVAL: 572 MS
EKG QRS DURATION: 102 MS
EKG QRS DURATION: 116 MS
EKG QRS DURATION: 116 MS
EKG QTC CALCULATION (BAZETT): 388 MS
EKG QTC CALCULATION (BAZETT): 392 MS
EKG QTC CALCULATION (BAZETT): 454 MS
EKG R AXIS: 11 DEGREES
EKG R AXIS: 15 DEGREES
EKG R AXIS: 7 DEGREES
EKG T AXIS: -31 DEGREES
EKG T AXIS: 218 DEGREES
EKG T AXIS: 45 DEGREES
EKG VENTRICULAR RATE: 38 BPM
EKG VENTRICULAR RATE: 60 BPM
EKG VENTRICULAR RATE: 60 BPM
GFR AFRICAN AMERICAN: 48
GFR AFRICAN AMERICAN: 55.8
GFR AFRICAN AMERICAN: 56.9
GFR AFRICAN AMERICAN: >60
GFR NON-AFRICAN AMERICAN: 39.7
GFR NON-AFRICAN AMERICAN: 46.1
GFR NON-AFRICAN AMERICAN: 47
GFR NON-AFRICAN AMERICAN: 57.5
GLOBULIN: 2.5 G/DL (ref 2.3–3.5)
GLUCOSE BLD-MCNC: 72 MG/DL (ref 74–109)
GLUCOSE BLD-MCNC: 74 MG/DL (ref 74–109)
GLUCOSE BLD-MCNC: 87 MG/DL (ref 74–109)
GLUCOSE BLD-MCNC: 94 MG/DL (ref 74–109)
HCT VFR BLD CALC: 29.7 % (ref 37–47)
HEMOGLOBIN: 10 G/DL (ref 12–16)
MAGNESIUM: 1.9 MG/DL (ref 1.7–2.3)
MCH RBC QN AUTO: 27.4 PG (ref 27–31.3)
MCHC RBC AUTO-ENTMCNC: 33.6 % (ref 33–37)
MCV RBC AUTO: 81.5 FL (ref 82–100)
ORGANISM: ABNORMAL
PDW BLD-RTO: 21.7 % (ref 11.5–14.5)
PHOSPHORUS: 2 MG/DL (ref 2.5–4.5)
PLATELET # BLD: 26 K/UL (ref 130–400)
POTASSIUM SERPL-SCNC: 4 MEQ/L (ref 3.5–5.1)
POTASSIUM SERPL-SCNC: 4.2 MEQ/L (ref 3.5–5.1)
POTASSIUM SERPL-SCNC: 4.2 MEQ/L (ref 3.5–5.1)
POTASSIUM SERPL-SCNC: 4.3 MEQ/L (ref 3.5–5.1)
RBC # BLD: 3.64 M/UL (ref 4.2–5.4)
SODIUM BLD-SCNC: 147 MEQ/L (ref 132–144)
SODIUM BLD-SCNC: 147 MEQ/L (ref 132–144)
SODIUM BLD-SCNC: 148 MEQ/L (ref 132–144)
SODIUM BLD-SCNC: 149 MEQ/L (ref 132–144)
TOTAL PROTEIN: 5.1 G/DL (ref 6.4–8.1)
URINE CULTURE, ROUTINE: ABNORMAL
URINE CULTURE, ROUTINE: ABNORMAL
WBC # BLD: 4.3 K/UL (ref 4.8–10.8)

## 2018-05-12 PROCEDURE — 85027 COMPLETE CBC AUTOMATED: CPT

## 2018-05-12 PROCEDURE — 6360000002 HC RX W HCPCS: Performed by: INTERNAL MEDICINE

## 2018-05-12 PROCEDURE — 83735 ASSAY OF MAGNESIUM: CPT

## 2018-05-12 PROCEDURE — 2000000000 HC ICU R&B

## 2018-05-12 PROCEDURE — 2580000003 HC RX 258: Performed by: INTERNAL MEDICINE

## 2018-05-12 PROCEDURE — 2500000003 HC RX 250 WO HCPCS: Performed by: INTERNAL MEDICINE

## 2018-05-12 PROCEDURE — 99233 SBSQ HOSP IP/OBS HIGH 50: CPT | Performed by: INTERNAL MEDICINE

## 2018-05-12 PROCEDURE — 84100 ASSAY OF PHOSPHORUS: CPT

## 2018-05-12 PROCEDURE — 92610 EVALUATE SWALLOWING FUNCTION: CPT

## 2018-05-12 PROCEDURE — 92526 ORAL FUNCTION THERAPY: CPT

## 2018-05-12 PROCEDURE — 99222 1ST HOSP IP/OBS MODERATE 55: CPT | Performed by: INTERNAL MEDICINE

## 2018-05-12 PROCEDURE — 93005 ELECTROCARDIOGRAM TRACING: CPT

## 2018-05-12 PROCEDURE — 6370000000 HC RX 637 (ALT 250 FOR IP): Performed by: INTERNAL MEDICINE

## 2018-05-12 PROCEDURE — C9113 INJ PANTOPRAZOLE SODIUM, VIA: HCPCS | Performed by: INTERNAL MEDICINE

## 2018-05-12 PROCEDURE — 36415 COLL VENOUS BLD VENIPUNCTURE: CPT

## 2018-05-12 PROCEDURE — 80053 COMPREHEN METABOLIC PANEL: CPT

## 2018-05-12 PROCEDURE — 86141 C-REACTIVE PROTEIN HS: CPT

## 2018-05-12 PROCEDURE — 71045 X-RAY EXAM CHEST 1 VIEW: CPT

## 2018-05-12 RX ORDER — DEXTROSE AND SODIUM CHLORIDE 5; .45 G/100ML; G/100ML
INJECTION, SOLUTION INTRAVENOUS CONTINUOUS
Status: DISCONTINUED | OUTPATIENT
Start: 2018-05-12 | End: 2018-05-13

## 2018-05-12 RX ADMIN — MEROPENEM: 1 INJECTION, POWDER, FOR SOLUTION INTRAVENOUS at 01:15

## 2018-05-12 RX ADMIN — I.V. FAT EMULSION 500 ML: 20 EMULSION INTRAVENOUS at 20:04

## 2018-05-12 RX ADMIN — Medication 10 ML: at 09:30

## 2018-05-12 RX ADMIN — Medication 10 ML: at 09:31

## 2018-05-12 RX ADMIN — DEXTROSE AND SODIUM CHLORIDE: 5; 450 INJECTION, SOLUTION INTRAVENOUS at 09:26

## 2018-05-12 RX ADMIN — ASCORBIC ACID, VITAMIN A PALMITATE, CHOLECALCIFEROL, THIAMINE HYDROCHLORIDE, RIBOFLAVIN-5 PHOSPHATE SODIUM, PYRIDOXINE HYDROCHLORIDE, NIACINAMIDE, DEXPANTHENOL, ALPHA-TOCOPHEROL ACETATE, VITAMIN K1, FOLIC ACID, BIOTIN, CYANOCOBALAMIN: 200; 3300; 200; 6; 3.6; 6; 40; 15; 10; 150; 600; 60; 5 INJECTION, SOLUTION INTRAVENOUS at 15:55

## 2018-05-12 RX ADMIN — MEROPENEM: 1 INJECTION, POWDER, FOR SOLUTION INTRAVENOUS at 12:12

## 2018-05-12 RX ADMIN — DEXTROSE AND SODIUM CHLORIDE: 5; 450 INJECTION, SOLUTION INTRAVENOUS at 01:14

## 2018-05-12 RX ADMIN — PANTOPRAZOLE SODIUM 40 MG: 40 INJECTION, POWDER, FOR SOLUTION INTRAVENOUS at 09:26

## 2018-05-12 RX ADMIN — BISACODYL 10 MG: 10 SUPPOSITORY RECTAL at 09:26

## 2018-05-12 RX ADMIN — HYDRALAZINE HYDROCHLORIDE 10 MG: 20 INJECTION INTRAMUSCULAR; INTRAVENOUS at 20:43

## 2018-05-12 RX ADMIN — NOREPINEPHRINE BITARTRATE 2 MCG/MIN: 1 INJECTION INTRAVENOUS at 03:53

## 2018-05-12 RX ADMIN — MEROPENEM: 1 INJECTION, POWDER, FOR SOLUTION INTRAVENOUS at 23:59

## 2018-05-12 ASSESSMENT — PAIN SCALES - PAIN ASSESSMENT IN ADVANCED DEMENTIA (PAINAD)
CONSOLABILITY: 0
TOTALSCORE: 0
TOTALSCORE: 0
FACIALEXPRESSION: 0
CONSOLABILITY: 0
CONSOLABILITY: 0
BREATHING: 0
BODYLANGUAGE: 0
CONSOLABILITY: 0
NEGVOCALIZATION: 0
BODYLANGUAGE: 0
BREATHING: 0
TOTALSCORE: 0
NEGVOCALIZATION: 0
TOTALSCORE: 0
BREATHING: 0
CONSOLABILITY: 0
BREATHING: 0
TOTALSCORE: 0
BREATHING: 0
FACIALEXPRESSION: 0
CONSOLABILITY: 0
NEGVOCALIZATION: 0
NEGVOCALIZATION: 0
BODYLANGUAGE: 0
BODYLANGUAGE: 0
NEGVOCALIZATION: 0
CONSOLABILITY: 0
BODYLANGUAGE: 0
NEGVOCALIZATION: 0
FACIALEXPRESSION: 0
CONSOLABILITY: 0
BODYLANGUAGE: 0
TOTALSCORE: 0
TOTALSCORE: 0
BREATHING: 0
FACIALEXPRESSION: 0
CONSOLABILITY: 0
BREATHING: 0
BREATHING: 0
FACIALEXPRESSION: 0
BODYLANGUAGE: 0
TOTALSCORE: 0
TOTALSCORE: 0
NEGVOCALIZATION: 0
BREATHING: 0
CONSOLABILITY: 0
NEGVOCALIZATION: 0
NEGVOCALIZATION: 0
BODYLANGUAGE: 0
BREATHING: 0
CONSOLABILITY: 0
NEGVOCALIZATION: 0
BODYLANGUAGE: 0
TOTALSCORE: 0
BODYLANGUAGE: 0
NEGVOCALIZATION: 0
BREATHING: 0
TOTALSCORE: 0
BREATHING: 0
TOTALSCORE: 0
CONSOLABILITY: 0
TOTALSCORE: 0
NEGVOCALIZATION: 0
TOTALSCORE: 0
BODYLANGUAGE: 0
BREATHING: 0
NEGVOCALIZATION: 0
CONSOLABILITY: 0
FACIALEXPRESSION: 0
BREATHING: 0
TOTALSCORE: 0
FACIALEXPRESSION: 0
CONSOLABILITY: 0
FACIALEXPRESSION: 0
NEGVOCALIZATION: 0
NEGVOCALIZATION: 0
CONSOLABILITY: 0
BREATHING: 0
BODYLANGUAGE: 0
BODYLANGUAGE: 0
CONSOLABILITY: 0
TOTALSCORE: 0
BREATHING: 0
FACIALEXPRESSION: 0
BODYLANGUAGE: 0
FACIALEXPRESSION: 0
BODYLANGUAGE: 0
FACIALEXPRESSION: 0
BODYLANGUAGE: 0
BODYLANGUAGE: 0
NEGVOCALIZATION: 0
TOTALSCORE: 0
FACIALEXPRESSION: 0
BREATHING: 0
FACIALEXPRESSION: 0
NEGVOCALIZATION: 0
BREATHING: 0
CONSOLABILITY: 0
TOTALSCORE: 0
NEGVOCALIZATION: 0
BREATHING: 0
BODYLANGUAGE: 0
CONSOLABILITY: 0
BREATHING: 0
TOTALSCORE: 0
FACIALEXPRESSION: 0
NEGVOCALIZATION: 0
BODYLANGUAGE: 0
BODYLANGUAGE: 0
BREATHING: 0
FACIALEXPRESSION: 0
NEGVOCALIZATION: 0
FACIALEXPRESSION: 0
CONSOLABILITY: 0
FACIALEXPRESSION: 0
FACIALEXPRESSION: 0
NEGVOCALIZATION: 0
FACIALEXPRESSION: 0
NEGVOCALIZATION: 0
NEGVOCALIZATION: 0
FACIALEXPRESSION: 0
CONSOLABILITY: 0
BODYLANGUAGE: 0
BODYLANGUAGE: 0
FACIALEXPRESSION: 0
CONSOLABILITY: 0
BREATHING: 0
TOTALSCORE: 0
NEGVOCALIZATION: 0
CONSOLABILITY: 0
BODYLANGUAGE: 0
TOTALSCORE: 0
FACIALEXPRESSION: 0
BREATHING: 0
FACIALEXPRESSION: 0
BREATHING: 0
TOTALSCORE: 0
CONSOLABILITY: 0
CONSOLABILITY: 0
FACIALEXPRESSION: 0
TOTALSCORE: 0
BREATHING: 0
BODYLANGUAGE: 0
TOTALSCORE: 0
BODYLANGUAGE: 0
NEGVOCALIZATION: 0
TOTALSCORE: 0
NEGVOCALIZATION: 0
TOTALSCORE: 0
CONSOLABILITY: 0
BODYLANGUAGE: 0
FACIALEXPRESSION: 0
FACIALEXPRESSION: 0
BREATHING: 0
CONSOLABILITY: 0

## 2018-05-13 ENCOUNTER — APPOINTMENT (OUTPATIENT)
Dept: GENERAL RADIOLOGY | Age: 73
DRG: 720 | End: 2018-05-13
Payer: MEDICAID

## 2018-05-13 LAB
ALBUMIN SERPL-MCNC: 2.3 G/DL (ref 3.9–4.9)
ANION GAP SERPL CALCULATED.3IONS-SCNC: 12 MEQ/L (ref 7–13)
ANION GAP SERPL CALCULATED.3IONS-SCNC: 12 MEQ/L (ref 7–13)
ANION GAP SERPL CALCULATED.3IONS-SCNC: 8 MEQ/L (ref 7–13)
ANION GAP SERPL CALCULATED.3IONS-SCNC: 9 MEQ/L (ref 7–13)
BUN BLDV-MCNC: 18 MG/DL (ref 8–23)
BUN BLDV-MCNC: 19 MG/DL (ref 8–23)
BUN BLDV-MCNC: 20 MG/DL (ref 8–23)
BUN BLDV-MCNC: 22 MG/DL (ref 8–23)
CALCIUM SERPL-MCNC: 7.9 MG/DL (ref 8.6–10.2)
CALCIUM SERPL-MCNC: 7.9 MG/DL (ref 8.6–10.2)
CALCIUM SERPL-MCNC: 8.2 MG/DL (ref 8.6–10.2)
CALCIUM SERPL-MCNC: 8.8 MG/DL (ref 8.6–10.2)
CHLORIDE BLD-SCNC: 108 MEQ/L (ref 98–107)
CHLORIDE BLD-SCNC: 113 MEQ/L (ref 98–107)
CHLORIDE BLD-SCNC: 114 MEQ/L (ref 98–107)
CHLORIDE BLD-SCNC: 115 MEQ/L (ref 98–107)
CO2: 21 MEQ/L (ref 22–29)
CO2: 23 MEQ/L (ref 22–29)
CO2: 24 MEQ/L (ref 22–29)
CO2: 24 MEQ/L (ref 22–29)
CREAT SERPL-MCNC: 0.87 MG/DL (ref 0.5–0.9)
CREAT SERPL-MCNC: 0.88 MG/DL (ref 0.5–0.9)
CREAT SERPL-MCNC: 0.89 MG/DL (ref 0.5–0.9)
CREAT SERPL-MCNC: 0.96 MG/DL (ref 0.5–0.9)
GFR AFRICAN AMERICAN: >60
GFR NON-AFRICAN AMERICAN: 56.8
GFR NON-AFRICAN AMERICAN: >60
GLUCOSE BLD-MCNC: 101 MG/DL (ref 60–115)
GLUCOSE BLD-MCNC: 109 MG/DL (ref 74–109)
GLUCOSE BLD-MCNC: 130 MG/DL (ref 60–115)
GLUCOSE BLD-MCNC: 133 MG/DL (ref 74–109)
GLUCOSE BLD-MCNC: 92 MG/DL (ref 74–109)
GLUCOSE BLD-MCNC: 94 MG/DL (ref 74–109)
HCT VFR BLD CALC: 28.7 % (ref 37–47)
HEMOGLOBIN: 9.8 G/DL (ref 12–16)
MCH RBC QN AUTO: 27.8 PG (ref 27–31.3)
MCHC RBC AUTO-ENTMCNC: 34.1 % (ref 33–37)
MCV RBC AUTO: 81.5 FL (ref 82–100)
PDW BLD-RTO: 21.7 % (ref 11.5–14.5)
PERFORMED ON: ABNORMAL
PERFORMED ON: NORMAL
PHOSPHORUS: 2.6 MG/DL (ref 2.5–4.5)
PLATELET # BLD: 43 K/UL (ref 130–400)
PLATELET SLIDE REVIEW: ABNORMAL
POTASSIUM SERPL-SCNC: 3.8 MEQ/L (ref 3.5–5.1)
POTASSIUM SERPL-SCNC: 4.1 MEQ/L (ref 3.5–5.1)
POTASSIUM SERPL-SCNC: 4.4 MEQ/L (ref 3.5–5.1)
POTASSIUM SERPL-SCNC: 4.4 MEQ/L (ref 3.5–5.1)
RBC # BLD: 3.52 M/UL (ref 4.2–5.4)
SODIUM BLD-SCNC: 144 MEQ/L (ref 132–144)
SODIUM BLD-SCNC: 146 MEQ/L (ref 132–144)
SODIUM BLD-SCNC: 146 MEQ/L (ref 132–144)
SODIUM BLD-SCNC: 147 MEQ/L (ref 132–144)
WBC # BLD: 4.7 K/UL (ref 4.8–10.8)

## 2018-05-13 PROCEDURE — 2000000000 HC ICU R&B

## 2018-05-13 PROCEDURE — 36415 COLL VENOUS BLD VENIPUNCTURE: CPT

## 2018-05-13 PROCEDURE — 2580000003 HC RX 258: Performed by: INTERNAL MEDICINE

## 2018-05-13 PROCEDURE — 80069 RENAL FUNCTION PANEL: CPT

## 2018-05-13 PROCEDURE — 85027 COMPLETE CBC AUTOMATED: CPT

## 2018-05-13 PROCEDURE — 2500000003 HC RX 250 WO HCPCS: Performed by: INTERNAL MEDICINE

## 2018-05-13 PROCEDURE — 6360000002 HC RX W HCPCS: Performed by: INTERNAL MEDICINE

## 2018-05-13 PROCEDURE — 99232 SBSQ HOSP IP/OBS MODERATE 35: CPT | Performed by: INTERNAL MEDICINE

## 2018-05-13 PROCEDURE — 71045 X-RAY EXAM CHEST 1 VIEW: CPT

## 2018-05-13 PROCEDURE — C9113 INJ PANTOPRAZOLE SODIUM, VIA: HCPCS | Performed by: INTERNAL MEDICINE

## 2018-05-13 RX ORDER — SODIUM CHLORIDE 9 MG/ML
250 INJECTION, SOLUTION INTRAVENOUS ONCE
Status: DISCONTINUED | OUTPATIENT
Start: 2018-05-13 | End: 2018-05-14

## 2018-05-13 RX ORDER — SODIUM CHLORIDE 0.9 % (FLUSH) 0.9 %
10 SYRINGE (ML) INJECTION PRN
Status: DISCONTINUED | OUTPATIENT
Start: 2018-05-13 | End: 2018-05-18 | Stop reason: HOSPADM

## 2018-05-13 RX ORDER — 0.9 % SODIUM CHLORIDE 0.9 %
250 INTRAVENOUS SOLUTION INTRAVENOUS ONCE
Status: DISCONTINUED | OUTPATIENT
Start: 2018-05-13 | End: 2018-05-14

## 2018-05-13 RX ORDER — LIDOCAINE HYDROCHLORIDE 20 MG/ML
5 INJECTION, SOLUTION INFILTRATION; PERINEURAL ONCE
Status: COMPLETED | OUTPATIENT
Start: 2018-05-13 | End: 2018-05-14

## 2018-05-13 RX ORDER — SODIUM CHLORIDE 0.9 % (FLUSH) 0.9 %
10 SYRINGE (ML) INJECTION EVERY 12 HOURS SCHEDULED
Status: DISCONTINUED | OUTPATIENT
Start: 2018-05-13 | End: 2018-05-18 | Stop reason: HOSPADM

## 2018-05-13 RX ADMIN — I.V. FAT EMULSION 500 ML: 20 EMULSION INTRAVENOUS at 17:14

## 2018-05-13 RX ADMIN — MEROPENEM: 1 INJECTION, POWDER, FOR SOLUTION INTRAVENOUS at 11:16

## 2018-05-13 RX ADMIN — DEXTROSE AND SODIUM CHLORIDE: 5; 450 INJECTION, SOLUTION INTRAVENOUS at 04:04

## 2018-05-13 RX ADMIN — Medication 10 ML: at 11:16

## 2018-05-13 RX ADMIN — ASCORBIC ACID, VITAMIN A PALMITATE, CHOLECALCIFEROL, THIAMINE HYDROCHLORIDE, RIBOFLAVIN-5 PHOSPHATE SODIUM, PYRIDOXINE HYDROCHLORIDE, NIACINAMIDE, DEXPANTHENOL, ALPHA-TOCOPHEROL ACETATE, VITAMIN K1, FOLIC ACID, BIOTIN, CYANOCOBALAMIN: 200; 3300; 200; 6; 3.6; 6; 40; 15; 10; 150; 600; 60; 5 INJECTION, SOLUTION INTRAVENOUS at 17:14

## 2018-05-13 RX ADMIN — PANTOPRAZOLE SODIUM 40 MG: 40 INJECTION, POWDER, FOR SOLUTION INTRAVENOUS at 11:15

## 2018-05-13 ASSESSMENT — PAIN SCALES - PAIN ASSESSMENT IN ADVANCED DEMENTIA (PAINAD)
BODYLANGUAGE: 0
BODYLANGUAGE: 0
TOTALSCORE: 0
NEGVOCALIZATION: 0
BODYLANGUAGE: 0
FACIALEXPRESSION: 0
FACIALEXPRESSION: 0
NEGVOCALIZATION: 0
BODYLANGUAGE: 0
FACIALEXPRESSION: 0
NEGVOCALIZATION: 0
CONSOLABILITY: 0
NEGVOCALIZATION: 0
NEGVOCALIZATION: 0
TOTALSCORE: 0
NEGVOCALIZATION: 0
CONSOLABILITY: 0
CONSOLABILITY: 0
FACIALEXPRESSION: 0
NEGVOCALIZATION: 0
BREATHING: 0
NEGVOCALIZATION: 0
BODYLANGUAGE: 0
FACIALEXPRESSION: 0
NEGVOCALIZATION: 0
TOTALSCORE: 0
TOTALSCORE: 0
BREATHING: 0
NEGVOCALIZATION: 0
FACIALEXPRESSION: 0
TOTALSCORE: 0
NEGVOCALIZATION: 0
BREATHING: 0
TOTALSCORE: 0
FACIALEXPRESSION: 0
BREATHING: 0
TOTALSCORE: 0
CONSOLABILITY: 0
CONSOLABILITY: 0
BREATHING: 0
TOTALSCORE: 0
BODYLANGUAGE: 0
NEGVOCALIZATION: 0
CONSOLABILITY: 0
BODYLANGUAGE: 1
NEGVOCALIZATION: 0
BREATHING: 0
BREATHING: 0
TOTALSCORE: 1
BREATHING: 0
BODYLANGUAGE: 0
CONSOLABILITY: 0
TOTALSCORE: 0
CONSOLABILITY: 0
TOTALSCORE: 0
CONSOLABILITY: 0
NEGVOCALIZATION: 0
NEGVOCALIZATION: 0
BREATHING: 0
FACIALEXPRESSION: 0
FACIALEXPRESSION: 0
NEGVOCALIZATION: 0
FACIALEXPRESSION: 0
NEGVOCALIZATION: 0
CONSOLABILITY: 0
BODYLANGUAGE: 0
FACIALEXPRESSION: 0
CONSOLABILITY: 0
FACIALEXPRESSION: 0
NEGVOCALIZATION: 0
BODYLANGUAGE: 0
TOTALSCORE: 0
NEGVOCALIZATION: 0
TOTALSCORE: 0
CONSOLABILITY: 0
TOTALSCORE: 0
NEGVOCALIZATION: 0
BODYLANGUAGE: 0
BREATHING: 0
FACIALEXPRESSION: 0
CONSOLABILITY: 0
BODYLANGUAGE: 1
BODYLANGUAGE: 0
TOTALSCORE: 0
BREATHING: 0
BREATHING: 0
FACIALEXPRESSION: 0
CONSOLABILITY: 0
BODYLANGUAGE: 0
BODYLANGUAGE: 0
BREATHING: 0
FACIALEXPRESSION: 0
FACIALEXPRESSION: 0
TOTALSCORE: 0
BREATHING: 0
FACIALEXPRESSION: 0
TOTALSCORE: 0
NEGVOCALIZATION: 0
BREATHING: 0
TOTALSCORE: 0
BREATHING: 0
NEGVOCALIZATION: 0
CONSOLABILITY: 0
CONSOLABILITY: 0
FACIALEXPRESSION: 0
TOTALSCORE: 0
BREATHING: 0
FACIALEXPRESSION: 0
BREATHING: 0
TOTALSCORE: 0
NEGVOCALIZATION: 0
BODYLANGUAGE: 0
TOTALSCORE: 0
BODYLANGUAGE: 0
BODYLANGUAGE: 0
CONSOLABILITY: 0
BREATHING: 0
CONSOLABILITY: 0
BREATHING: 0
FACIALEXPRESSION: 0
CONSOLABILITY: 0
BREATHING: 0
FACIALEXPRESSION: 0
NEGVOCALIZATION: 0
CONSOLABILITY: 0
FACIALEXPRESSION: 0
BODYLANGUAGE: 0
CONSOLABILITY: 0
BODYLANGUAGE: 0
NEGVOCALIZATION: 0
FACIALEXPRESSION: 0
TOTALSCORE: 0
TOTALSCORE: 0
BODYLANGUAGE: 0
NEGVOCALIZATION: 0
BODYLANGUAGE: 0
BREATHING: 0
CONSOLABILITY: 0
BODYLANGUAGE: 0
BREATHING: 0
CONSOLABILITY: 0
FACIALEXPRESSION: 0
TOTALSCORE: 0
BODYLANGUAGE: 0
FACIALEXPRESSION: 0
BREATHING: 0
CONSOLABILITY: 0
BREATHING: 0
FACIALEXPRESSION: 0
TOTALSCORE: 1
TOTALSCORE: 0
BREATHING: 0

## 2018-05-14 ENCOUNTER — APPOINTMENT (OUTPATIENT)
Dept: INTERVENTIONAL RADIOLOGY/VASCULAR | Age: 73
DRG: 720 | End: 2018-05-14
Payer: MEDICAID

## 2018-05-14 ENCOUNTER — APPOINTMENT (OUTPATIENT)
Dept: MRI IMAGING | Age: 73
DRG: 720 | End: 2018-05-14
Payer: MEDICAID

## 2018-05-14 ENCOUNTER — APPOINTMENT (OUTPATIENT)
Dept: CT IMAGING | Age: 73
DRG: 720 | End: 2018-05-14
Payer: MEDICAID

## 2018-05-14 ENCOUNTER — APPOINTMENT (OUTPATIENT)
Dept: GENERAL RADIOLOGY | Age: 73
DRG: 720 | End: 2018-05-14
Payer: MEDICAID

## 2018-05-14 LAB
ABO/RH: NORMAL
ALBUMIN SERPL-MCNC: 2.5 G/DL (ref 3.9–4.9)
ANION GAP SERPL CALCULATED.3IONS-SCNC: 12 MEQ/L (ref 7–13)
ANION GAP SERPL CALCULATED.3IONS-SCNC: 13 MEQ/L (ref 7–13)
ANION GAP SERPL CALCULATED.3IONS-SCNC: 8 MEQ/L (ref 7–13)
ANTIBODY SCREEN: NORMAL
BASE EXCESS ARTERIAL: 1 (ref -3–3)
BLOOD BANK DISPENSE STATUS: NORMAL
BLOOD BANK PRODUCT CODE: NORMAL
BLOOD CULTURE, ROUTINE: NORMAL
BPU ID: NORMAL
BUN BLDV-MCNC: 23 MG/DL (ref 8–23)
BUN BLDV-MCNC: 24 MG/DL (ref 8–23)
BUN BLDV-MCNC: 25 MG/DL (ref 8–23)
CALCIUM IONIZED: 1.31 MMOL/L (ref 1.12–1.32)
CALCIUM SERPL-MCNC: 7.9 MG/DL (ref 8.6–10.2)
CALCIUM SERPL-MCNC: 8.5 MG/DL (ref 8.6–10.2)
CALCIUM SERPL-MCNC: 8.6 MG/DL (ref 8.6–10.2)
CHLORIDE BLD-SCNC: 110 MEQ/L (ref 98–107)
CHLORIDE BLD-SCNC: 110 MEQ/L (ref 98–107)
CHLORIDE BLD-SCNC: 112 MEQ/L (ref 98–107)
CO2: 22 MEQ/L (ref 22–29)
CO2: 22 MEQ/L (ref 22–29)
CO2: 25 MEQ/L (ref 22–29)
CREAT SERPL-MCNC: 0.72 MG/DL (ref 0.5–0.9)
CREAT SERPL-MCNC: 0.82 MG/DL (ref 0.5–0.9)
CREAT SERPL-MCNC: 0.84 MG/DL (ref 0.5–0.9)
CULTURE, BLOOD 2: NORMAL
DESCRIPTION BLOOD BANK: NORMAL
GFR AFRICAN AMERICAN: >60
GFR NON-AFRICAN AMERICAN: 54
GFR NON-AFRICAN AMERICAN: >60
GLUCOSE BLD-MCNC: 111 MG/DL (ref 60–115)
GLUCOSE BLD-MCNC: 116 MG/DL (ref 74–109)
GLUCOSE BLD-MCNC: 116 MG/DL (ref 74–109)
GLUCOSE BLD-MCNC: 117 MG/DL (ref 60–115)
GLUCOSE BLD-MCNC: 150 MG/DL (ref 74–109)
GLUCOSE BLD-MCNC: 183 MG/DL (ref 60–115)
GLUCOSE BLD-MCNC: 242 MG/DL (ref 60–115)
HCO3 ARTERIAL: 24.7 MMOL/L (ref 21–29)
HEMOGLOBIN: 10.6 GM/DL (ref 12–16)
LACTATE: 0.77 MMOL/L (ref 0.4–2)
O2 SAT, ARTERIAL: 96 % (ref 93–100)
PCO2 ARTERIAL: 35 MM HG (ref 35–45)
PERFORMED ON: ABNORMAL
PERFORMED ON: NORMAL
PH ARTERIAL: 7.46 (ref 7.35–7.45)
PHOSPHORUS: 3.4 MG/DL (ref 2.5–4.5)
PLATELET # BLD: 48 K/UL (ref 130–400)
PO2 ARTERIAL: 77 MM HG (ref 75–108)
POC CHLORIDE: 112 MEQ/L (ref 99–110)
POC CREATININE: 1 MG/DL (ref 0.6–1.2)
POC HEMATOCRIT: 31 % (ref 36–48)
POC POTASSIUM: 4.1 MEQ/L (ref 3.5–5.1)
POC SAMPLE TYPE: ABNORMAL
POC SODIUM: 146 MEQ/L (ref 136–145)
POTASSIUM SERPL-SCNC: 4.3 MEQ/L (ref 3.5–5.1)
POTASSIUM SERPL-SCNC: 4.4 MEQ/L (ref 3.5–5.1)
POTASSIUM SERPL-SCNC: 4.4 MEQ/L (ref 3.5–5.1)
SODIUM BLD-SCNC: 143 MEQ/L (ref 132–144)
SODIUM BLD-SCNC: 145 MEQ/L (ref 132–144)
SODIUM BLD-SCNC: 146 MEQ/L (ref 132–144)
TCO2 ARTERIAL: 26 (ref 22–29)

## 2018-05-14 PROCEDURE — 99233 SBSQ HOSP IP/OBS HIGH 50: CPT | Performed by: INTERNAL MEDICINE

## 2018-05-14 PROCEDURE — 86900 BLOOD TYPING SEROLOGIC ABO: CPT

## 2018-05-14 PROCEDURE — 82565 ASSAY OF CREATININE: CPT

## 2018-05-14 PROCEDURE — 6360000002 HC RX W HCPCS: Performed by: INTERNAL MEDICINE

## 2018-05-14 PROCEDURE — 70450 CT HEAD/BRAIN W/O DYE: CPT

## 2018-05-14 PROCEDURE — 80069 RENAL FUNCTION PANEL: CPT

## 2018-05-14 PROCEDURE — 2580000003 HC RX 258: Performed by: INTERNAL MEDICINE

## 2018-05-14 PROCEDURE — A9579 GAD-BASE MR CONTRAST NOS,1ML: HCPCS | Performed by: PSYCHIATRY & NEUROLOGY

## 2018-05-14 PROCEDURE — C1751 CATH, INF, PER/CENT/MIDLINE: HCPCS

## 2018-05-14 PROCEDURE — 82803 BLOOD GASES ANY COMBINATION: CPT

## 2018-05-14 PROCEDURE — 84132 ASSAY OF SERUM POTASSIUM: CPT

## 2018-05-14 PROCEDURE — 36415 COLL VENOUS BLD VENIPUNCTURE: CPT

## 2018-05-14 PROCEDURE — 77001 FLUOROGUIDE FOR VEIN DEVICE: CPT | Performed by: RADIOLOGY

## 2018-05-14 PROCEDURE — 76937 US GUIDE VASCULAR ACCESS: CPT | Performed by: RADIOLOGY

## 2018-05-14 PROCEDURE — 02HV33Z INSERTION OF INFUSION DEVICE INTO SUPERIOR VENA CAVA, PERCUTANEOUS APPROACH: ICD-10-PCS | Performed by: INTERNAL MEDICINE

## 2018-05-14 PROCEDURE — 6360000004 HC RX CONTRAST MEDICATION: Performed by: PSYCHIATRY & NEUROLOGY

## 2018-05-14 PROCEDURE — 82948 REAGENT STRIP/BLOOD GLUCOSE: CPT

## 2018-05-14 PROCEDURE — 82330 ASSAY OF CALCIUM: CPT

## 2018-05-14 PROCEDURE — 36600 WITHDRAWAL OF ARTERIAL BLOOD: CPT

## 2018-05-14 PROCEDURE — 85049 AUTOMATED PLATELET COUNT: CPT

## 2018-05-14 PROCEDURE — 2500000003 HC RX 250 WO HCPCS: Performed by: INTERNAL MEDICINE

## 2018-05-14 PROCEDURE — 82435 ASSAY OF BLOOD CHLORIDE: CPT

## 2018-05-14 PROCEDURE — 87529 HSV DNA AMP PROBE: CPT

## 2018-05-14 PROCEDURE — 71045 X-RAY EXAM CHEST 1 VIEW: CPT

## 2018-05-14 PROCEDURE — 36569 INSJ PICC 5 YR+ W/O IMAGING: CPT | Performed by: RADIOLOGY

## 2018-05-14 PROCEDURE — 6370000000 HC RX 637 (ALT 250 FOR IP): Performed by: INTERNAL MEDICINE

## 2018-05-14 PROCEDURE — 1210000000 HC MED SURG R&B

## 2018-05-14 PROCEDURE — 70553 MRI BRAIN STEM W/O & W/DYE: CPT

## 2018-05-14 PROCEDURE — 85014 HEMATOCRIT: CPT

## 2018-05-14 PROCEDURE — C9113 INJ PANTOPRAZOLE SODIUM, VIA: HCPCS | Performed by: INTERNAL MEDICINE

## 2018-05-14 PROCEDURE — 83605 ASSAY OF LACTIC ACID: CPT

## 2018-05-14 PROCEDURE — 99232 SBSQ HOSP IP/OBS MODERATE 35: CPT | Performed by: INTERNAL MEDICINE

## 2018-05-14 PROCEDURE — 86901 BLOOD TYPING SEROLOGIC RH(D): CPT

## 2018-05-14 PROCEDURE — 86850 RBC ANTIBODY SCREEN: CPT

## 2018-05-14 RX ORDER — SODIUM CHLORIDE 0.9 % (FLUSH) 0.9 %
10 SYRINGE (ML) INJECTION EVERY 12 HOURS SCHEDULED
Status: DISCONTINUED | OUTPATIENT
Start: 2018-05-14 | End: 2018-05-18 | Stop reason: HOSPADM

## 2018-05-14 RX ORDER — FUROSEMIDE 10 MG/ML
20 INJECTION INTRAMUSCULAR; INTRAVENOUS ONCE
Status: COMPLETED | OUTPATIENT
Start: 2018-05-14 | End: 2018-05-14

## 2018-05-14 RX ORDER — MAGNESIUM SULFATE IN WATER 40 MG/ML
2 INJECTION, SOLUTION INTRAVENOUS ONCE
Status: COMPLETED | OUTPATIENT
Start: 2018-05-14 | End: 2018-05-15

## 2018-05-14 RX ORDER — SODIUM CHLORIDE 0.9 % (FLUSH) 0.9 %
10 SYRINGE (ML) INJECTION PRN
Status: DISCONTINUED | OUTPATIENT
Start: 2018-05-14 | End: 2018-05-18 | Stop reason: HOSPADM

## 2018-05-14 RX ORDER — SODIUM CHLORIDE 9 MG/ML
INJECTION, SOLUTION INTRAVENOUS ONCE
Status: COMPLETED | OUTPATIENT
Start: 2018-05-14 | End: 2018-05-14

## 2018-05-14 RX ORDER — SODIUM CHLORIDE 0.9 % (FLUSH) 0.9 %
10 SYRINGE (ML) INJECTION 2 TIMES DAILY
Status: DISCONTINUED | OUTPATIENT
Start: 2018-05-14 | End: 2018-05-18 | Stop reason: HOSPADM

## 2018-05-14 RX ADMIN — MAGNESIUM SULFATE HEPTAHYDRATE 2 G: 40 INJECTION, SOLUTION INTRAVENOUS at 22:43

## 2018-05-14 RX ADMIN — FUROSEMIDE 20 MG: 10 INJECTION, SOLUTION INTRAVENOUS at 22:12

## 2018-05-14 RX ADMIN — LIDOCAINE HYDROCHLORIDE 10 ML: 20 INJECTION, SOLUTION INFILTRATION; PERINEURAL at 12:10

## 2018-05-14 RX ADMIN — MEROPENEM: 1 INJECTION, POWDER, FOR SOLUTION INTRAVENOUS at 01:10

## 2018-05-14 RX ADMIN — ASCORBIC ACID, VITAMIN A PALMITATE, CHOLECALCIFEROL, THIAMINE HYDROCHLORIDE, RIBOFLAVIN-5 PHOSPHATE SODIUM, PYRIDOXINE HYDROCHLORIDE, NIACINAMIDE, DEXPANTHENOL, ALPHA-TOCOPHEROL ACETATE, VITAMIN K1, FOLIC ACID, BIOTIN, CYANOCOBALAMIN: 200; 3300; 200; 6; 3.6; 6; 40; 15; 10; 150; 600; 60; 5 INJECTION, SOLUTION INTRAVENOUS at 22:06

## 2018-05-14 RX ADMIN — Medication 10 ML: at 17:16

## 2018-05-14 RX ADMIN — GADOTERIDOL 20 ML: 279.3 INJECTION, SOLUTION INTRAVENOUS at 19:48

## 2018-05-14 RX ADMIN — ONDANSETRON 4 MG: 2 INJECTION INTRAMUSCULAR; INTRAVENOUS at 11:12

## 2018-05-14 RX ADMIN — Medication 10 ML: at 08:51

## 2018-05-14 RX ADMIN — CALCIUM 500 MG: 500 TABLET ORAL at 08:51

## 2018-05-14 RX ADMIN — PANTOPRAZOLE SODIUM 40 MG: 40 INJECTION, POWDER, FOR SOLUTION INTRAVENOUS at 08:51

## 2018-05-14 RX ADMIN — MEROPENEM: 1 INJECTION, POWDER, FOR SOLUTION INTRAVENOUS at 17:15

## 2018-05-14 RX ADMIN — SODIUM CHLORIDE: 9 INJECTION, SOLUTION INTRAVENOUS at 12:10

## 2018-05-14 RX ADMIN — I.V. FAT EMULSION 500 ML: 20 EMULSION INTRAVENOUS at 22:25

## 2018-05-14 RX ADMIN — Medication 10 ML: at 11:13

## 2018-05-14 ASSESSMENT — PAIN SCALES - PAIN ASSESSMENT IN ADVANCED DEMENTIA (PAINAD)
BREATHING: 0
FACIALEXPRESSION: 0
NEGVOCALIZATION: 0
BODYLANGUAGE: 0
BODYLANGUAGE: 0
FACIALEXPRESSION: 0
BREATHING: 0
NEGVOCALIZATION: 0
BODYLANGUAGE: 0
BODYLANGUAGE: 0
CONSOLABILITY: 0
FACIALEXPRESSION: 0
NEGVOCALIZATION: 0
BREATHING: 0
TOTALSCORE: 0
NEGVOCALIZATION: 0
FACIALEXPRESSION: 0
FACIALEXPRESSION: 0
CONSOLABILITY: 0
BODYLANGUAGE: 0
CONSOLABILITY: 0
BREATHING: 0
BREATHING: 0
TOTALSCORE: 0
NEGVOCALIZATION: 0
TOTALSCORE: 0
BREATHING: 0
TOTALSCORE: 0
TOTALSCORE: 0
FACIALEXPRESSION: 0
TOTALSCORE: 0
TOTALSCORE: 0
BODYLANGUAGE: 0
TOTALSCORE: 0
BODYLANGUAGE: 0
TOTALSCORE: 0
BODYLANGUAGE: 0
CONSOLABILITY: 0
NEGVOCALIZATION: 0
BODYLANGUAGE: 0
NEGVOCALIZATION: 0
CONSOLABILITY: 0
FACIALEXPRESSION: 0
TOTALSCORE: 0
FACIALEXPRESSION: 0
CONSOLABILITY: 0
BODYLANGUAGE: 0
FACIALEXPRESSION: 0
CONSOLABILITY: 0
BREATHING: 0
NEGVOCALIZATION: 0
BREATHING: 0
CONSOLABILITY: 0
FACIALEXPRESSION: 0
FACIALEXPRESSION: 0
CONSOLABILITY: 0
TOTALSCORE: 0
BODYLANGUAGE: 0
NEGVOCALIZATION: 0

## 2018-05-14 ASSESSMENT — PAIN SCALES - GENERAL
PAINLEVEL_OUTOF10: 0
PAINLEVEL_OUTOF10: 0

## 2018-05-15 LAB
ALBUMIN SERPL-MCNC: 2.6 G/DL (ref 3.9–4.9)
ANION GAP SERPL CALCULATED.3IONS-SCNC: 10 MEQ/L (ref 7–13)
ANISOCYTOSIS: ABNORMAL
APTT: 34.8 SEC (ref 21.6–35.4)
APTT: NORMAL SEC (ref 21.6–35.4)
ATYPICAL LYMPHOCYTE RELATIVE PERCENT: 2 %
BANDED NEUTROPHILS RELATIVE PERCENT: 5 % (ref 5–11)
BASOPHILS ABSOLUTE: 0.1 K/UL (ref 0–0.2)
BASOPHILS ABSOLUTE: NORMAL K/UL (ref 0–0.2)
BASOPHILS RELATIVE PERCENT: 1 %
BASOPHILS RELATIVE PERCENT: NORMAL %
BUN BLDV-MCNC: 28 MG/DL (ref 8–23)
CALCIUM SERPL-MCNC: 8.9 MG/DL (ref 8.6–10.2)
CHLORIDE BLD-SCNC: 106 MEQ/L (ref 98–107)
CO2: 26 MEQ/L (ref 22–29)
CREAT SERPL-MCNC: 0.69 MG/DL (ref 0.5–0.9)
EOSINOPHILS ABSOLUTE: 0 K/UL (ref 0–0.7)
EOSINOPHILS ABSOLUTE: NORMAL K/UL (ref 0–0.7)
EOSINOPHILS RELATIVE PERCENT: 0.2 %
EOSINOPHILS RELATIVE PERCENT: NORMAL %
GFR AFRICAN AMERICAN: >60
GFR NON-AFRICAN AMERICAN: >60
GLUCOSE BLD-MCNC: 178 MG/DL (ref 60–115)
GLUCOSE BLD-MCNC: 182 MG/DL (ref 60–115)
GLUCOSE BLD-MCNC: 182 MG/DL (ref 60–115)
GLUCOSE BLD-MCNC: 185 MG/DL (ref 60–115)
GLUCOSE BLD-MCNC: 185 MG/DL (ref 74–109)
GLUCOSE BLD-MCNC: 191 MG/DL (ref 60–115)
HCT VFR BLD CALC: 30.9 % (ref 37–47)
HCT VFR BLD CALC: NORMAL % (ref 37–47)
HEMOGLOBIN: 10.6 G/DL (ref 12–16)
HEMOGLOBIN: NORMAL G/DL (ref 12–16)
HYPOCHROMIA: ABNORMAL
INR BLD: 1
INR BLD: NORMAL
LYMPHOCYTES ABSOLUTE: 1 K/UL (ref 1–4.8)
LYMPHOCYTES ABSOLUTE: NORMAL K/UL (ref 1–4.8)
LYMPHOCYTES RELATIVE PERCENT: 8 %
LYMPHOCYTES RELATIVE PERCENT: NORMAL %
MAGNESIUM: 2.1 MG/DL (ref 1.7–2.3)
MCH RBC QN AUTO: 27.9 PG (ref 27–31.3)
MCH RBC QN AUTO: NORMAL PG (ref 27–31.3)
MCHC RBC AUTO-ENTMCNC: 34.3 % (ref 33–37)
MCHC RBC AUTO-ENTMCNC: NORMAL % (ref 33–37)
MCV RBC AUTO: 81.3 FL (ref 82–100)
MCV RBC AUTO: NORMAL FL (ref 82–100)
METAMYELOCYTES RELATIVE PERCENT: 4 %
MICROCYTES: ABNORMAL
MONOCYTES ABSOLUTE: 0.3 K/UL (ref 0.2–0.8)
MONOCYTES ABSOLUTE: NORMAL K/UL (ref 0.2–0.8)
MONOCYTES RELATIVE PERCENT: 2.9 %
MONOCYTES RELATIVE PERCENT: NORMAL %
MYELOCYTE PERCENT: 1 %
NEUTROPHILS ABSOLUTE: 8.4 K/UL (ref 1.4–6.5)
NEUTROPHILS ABSOLUTE: NORMAL K/UL (ref 1.4–6.5)
NEUTROPHILS RELATIVE PERCENT: 77 %
NEUTROPHILS RELATIVE PERCENT: NORMAL %
PDW BLD-RTO: 21.6 % (ref 11.5–14.5)
PDW BLD-RTO: NORMAL % (ref 11.5–14.5)
PERFORMED ON: ABNORMAL
PHOSPHORUS: 3.6 MG/DL (ref 2.5–4.5)
PLATELET # BLD: 71 K/UL (ref 130–400)
PLATELET # BLD: NORMAL K/UL (ref 130–400)
PLATELET SLIDE REVIEW: ABNORMAL
POTASSIUM SERPL-SCNC: 3.8 MEQ/L (ref 3.5–5.1)
PROTHROMBIN TIME: 10.7 SEC (ref 9.6–12.3)
PROTHROMBIN TIME: NORMAL SEC (ref 9.6–12.3)
RBC # BLD: 3.81 M/UL (ref 4.2–5.4)
RBC # BLD: NORMAL M/UL (ref 4.2–5.4)
SLIDE REVIEW: ABNORMAL
SMUDGE CELLS: 1
SODIUM BLD-SCNC: 142 MEQ/L (ref 132–144)
WBC # BLD: 9.6 K/UL (ref 4.8–10.8)
WBC # BLD: NORMAL K/UL (ref 4.8–10.8)

## 2018-05-15 PROCEDURE — 83735 ASSAY OF MAGNESIUM: CPT

## 2018-05-15 PROCEDURE — 99232 SBSQ HOSP IP/OBS MODERATE 35: CPT | Performed by: INTERNAL MEDICINE

## 2018-05-15 PROCEDURE — 80069 RENAL FUNCTION PANEL: CPT

## 2018-05-15 PROCEDURE — 6360000002 HC RX W HCPCS: Performed by: INTERNAL MEDICINE

## 2018-05-15 PROCEDURE — 85025 COMPLETE CBC W/AUTO DIFF WBC: CPT

## 2018-05-15 PROCEDURE — 85730 THROMBOPLASTIN TIME PARTIAL: CPT

## 2018-05-15 PROCEDURE — 1210000000 HC MED SURG R&B

## 2018-05-15 PROCEDURE — 2580000003 HC RX 258: Performed by: INTERNAL MEDICINE

## 2018-05-15 PROCEDURE — 36415 COLL VENOUS BLD VENIPUNCTURE: CPT

## 2018-05-15 PROCEDURE — 85610 PROTHROMBIN TIME: CPT

## 2018-05-15 PROCEDURE — 2500000003 HC RX 250 WO HCPCS: Performed by: INTERNAL MEDICINE

## 2018-05-15 PROCEDURE — C9113 INJ PANTOPRAZOLE SODIUM, VIA: HCPCS | Performed by: INTERNAL MEDICINE

## 2018-05-15 PROCEDURE — 2580000003 HC RX 258: Performed by: PSYCHIATRY & NEUROLOGY

## 2018-05-15 RX ORDER — LIDOCAINE HYDROCHLORIDE 20 MG/ML
5 INJECTION, SOLUTION INFILTRATION; PERINEURAL
Status: DISCONTINUED | OUTPATIENT
Start: 2018-05-15 | End: 2018-05-15

## 2018-05-15 RX ORDER — FUROSEMIDE 10 MG/ML
20 INJECTION INTRAMUSCULAR; INTRAVENOUS ONCE
Status: COMPLETED | OUTPATIENT
Start: 2018-05-15 | End: 2018-05-15

## 2018-05-15 RX ORDER — POTASSIUM CHLORIDE 7.45 MG/ML
10 INJECTION INTRAVENOUS ONCE
Status: COMPLETED | OUTPATIENT
Start: 2018-05-15 | End: 2018-05-15

## 2018-05-15 RX ADMIN — Medication 10 ML: at 22:37

## 2018-05-15 RX ADMIN — I.V. FAT EMULSION 500 ML: 20 EMULSION INTRAVENOUS at 17:56

## 2018-05-15 RX ADMIN — HYDRALAZINE HYDROCHLORIDE 10 MG: 20 INJECTION INTRAMUSCULAR; INTRAVENOUS at 15:19

## 2018-05-15 RX ADMIN — MEROPENEM: 1 INJECTION, POWDER, FOR SOLUTION INTRAVENOUS at 17:47

## 2018-05-15 RX ADMIN — FUROSEMIDE 20 MG: 10 INJECTION, SOLUTION INTRAVENOUS at 16:37

## 2018-05-15 RX ADMIN — POTASSIUM CHLORIDE 10 MEQ: 7.46 INJECTION, SOLUTION INTRAVENOUS at 16:38

## 2018-05-15 RX ADMIN — MEROPENEM: 1 INJECTION, POWDER, FOR SOLUTION INTRAVENOUS at 05:37

## 2018-05-15 RX ADMIN — Medication 10 ML: at 09:00

## 2018-05-15 RX ADMIN — HYDRALAZINE HYDROCHLORIDE 10 MG: 20 INJECTION INTRAMUSCULAR; INTRAVENOUS at 00:27

## 2018-05-15 RX ADMIN — PANTOPRAZOLE SODIUM 40 MG: 40 INJECTION, POWDER, FOR SOLUTION INTRAVENOUS at 09:59

## 2018-05-15 RX ADMIN — ONDANSETRON 4 MG: 2 INJECTION INTRAMUSCULAR; INTRAVENOUS at 05:22

## 2018-05-15 ASSESSMENT — PAIN SCALES - GENERAL: PAINLEVEL_OUTOF10: 0

## 2018-05-16 ENCOUNTER — APPOINTMENT (OUTPATIENT)
Dept: ULTRASOUND IMAGING | Age: 73
DRG: 720 | End: 2018-05-16
Payer: MEDICAID

## 2018-05-16 LAB
ALBUMIN SERPL-MCNC: 2.4 G/DL (ref 3.9–4.9)
ANION GAP SERPL CALCULATED.3IONS-SCNC: 13 MEQ/L (ref 7–13)
BUN BLDV-MCNC: 43 MG/DL (ref 8–23)
C-REACTIVE PROTEIN, HIGH SENSITIVITY: 170.4 MG/L (ref 0–5)
CALCIUM SERPL-MCNC: 8.6 MG/DL (ref 8.6–10.2)
CHLORIDE BLD-SCNC: 106 MEQ/L (ref 98–107)
CO2: 22 MEQ/L (ref 22–29)
CREAT SERPL-MCNC: 1.08 MG/DL (ref 0.5–0.9)
GFR AFRICAN AMERICAN: 60
GFR NON-AFRICAN AMERICAN: 49.6
GLUCOSE BLD-MCNC: 185 MG/DL (ref 60–115)
GLUCOSE BLD-MCNC: 186 MG/DL (ref 60–115)
GLUCOSE BLD-MCNC: 193 MG/DL (ref 74–109)
GLUCOSE BLD-MCNC: 207 MG/DL (ref 60–115)
PERFORMED ON: ABNORMAL
PHOSPHORUS: 3.9 MG/DL (ref 2.5–4.5)
POTASSIUM SERPL-SCNC: 4.7 MEQ/L (ref 3.5–5.1)
SODIUM BLD-SCNC: 141 MEQ/L (ref 132–144)

## 2018-05-16 PROCEDURE — G8988 SELF CARE GOAL STATUS: HCPCS

## 2018-05-16 PROCEDURE — 97163 PT EVAL HIGH COMPLEX 45 MIN: CPT

## 2018-05-16 PROCEDURE — 2580000003 HC RX 258: Performed by: INTERNAL MEDICINE

## 2018-05-16 PROCEDURE — G8987 SELF CARE CURRENT STATUS: HCPCS

## 2018-05-16 PROCEDURE — 86141 C-REACTIVE PROTEIN HS: CPT

## 2018-05-16 PROCEDURE — 6360000002 HC RX W HCPCS: Performed by: INTERNAL MEDICINE

## 2018-05-16 PROCEDURE — 6370000000 HC RX 637 (ALT 250 FOR IP): Performed by: PSYCHIATRY & NEUROLOGY

## 2018-05-16 PROCEDURE — 99232 SBSQ HOSP IP/OBS MODERATE 35: CPT | Performed by: INTERNAL MEDICINE

## 2018-05-16 PROCEDURE — 93970 EXTREMITY STUDY: CPT

## 2018-05-16 PROCEDURE — C9113 INJ PANTOPRAZOLE SODIUM, VIA: HCPCS | Performed by: INTERNAL MEDICINE

## 2018-05-16 PROCEDURE — G8981 BODY POS CURRENT STATUS: HCPCS

## 2018-05-16 PROCEDURE — 2580000003 HC RX 258: Performed by: PSYCHIATRY & NEUROLOGY

## 2018-05-16 PROCEDURE — 97167 OT EVAL HIGH COMPLEX 60 MIN: CPT

## 2018-05-16 PROCEDURE — 1210000000 HC MED SURG R&B

## 2018-05-16 PROCEDURE — G8982 BODY POS GOAL STATUS: HCPCS

## 2018-05-16 PROCEDURE — 2500000003 HC RX 250 WO HCPCS: Performed by: INTERNAL MEDICINE

## 2018-05-16 PROCEDURE — 80069 RENAL FUNCTION PANEL: CPT

## 2018-05-16 RX ORDER — MODAFINIL 100 MG/1
100 TABLET ORAL 2 TIMES DAILY
Status: DISCONTINUED | OUTPATIENT
Start: 2018-05-16 | End: 2018-05-18 | Stop reason: HOSPADM

## 2018-05-16 RX ADMIN — ASCORBIC ACID, VITAMIN A PALMITATE, CHOLECALCIFEROL, THIAMINE HYDROCHLORIDE, RIBOFLAVIN-5 PHOSPHATE SODIUM, PYRIDOXINE HYDROCHLORIDE, NIACINAMIDE, DEXPANTHENOL, ALPHA-TOCOPHEROL ACETATE, VITAMIN K1, FOLIC ACID, BIOTIN, CYANOCOBALAMIN: 200; 3300; 200; 6; 3.6; 6; 40; 15; 10; 150; 600; 60; 5 INJECTION, SOLUTION INTRAVENOUS at 01:10

## 2018-05-16 RX ADMIN — Medication 10 ML: at 08:27

## 2018-05-16 RX ADMIN — MEROPENEM: 1 INJECTION, POWDER, FOR SOLUTION INTRAVENOUS at 06:52

## 2018-05-16 RX ADMIN — MODAFINIL 100 MG: 100 TABLET ORAL at 15:59

## 2018-05-16 RX ADMIN — PANTOPRAZOLE SODIUM 40 MG: 40 INJECTION, POWDER, FOR SOLUTION INTRAVENOUS at 08:27

## 2018-05-16 RX ADMIN — MEROPENEM: 1 INJECTION, POWDER, FOR SOLUTION INTRAVENOUS at 16:59

## 2018-05-16 RX ADMIN — Medication 10 ML: at 08:31

## 2018-05-16 ASSESSMENT — PAIN SCALES - PAIN ASSESSMENT IN ADVANCED DEMENTIA (PAINAD)
TOTALSCORE: 0
TOTALSCORE: 0
BODYLANGUAGE: 0
NEGVOCALIZATION: 0
CONSOLABILITY: 0
BODYLANGUAGE: 0
NEGVOCALIZATION: 0
CONSOLABILITY: 0
BREATHING: 0
FACIALEXPRESSION: 0
BREATHING: 0
TOTALSCORE: 0
FACIALEXPRESSION: 0
CONSOLABILITY: 0
FACIALEXPRESSION: 0
BREATHING: 0
BODYLANGUAGE: 0
NEGVOCALIZATION: 0

## 2018-05-17 LAB
ALBUMIN SERPL-MCNC: 2.2 G/DL (ref 3.9–4.9)
ANION GAP SERPL CALCULATED.3IONS-SCNC: 11 MEQ/L (ref 7–13)
BUN BLDV-MCNC: 40 MG/DL (ref 8–23)
CALCIUM SERPL-MCNC: 8.4 MG/DL (ref 8.6–10.2)
CHLORIDE BLD-SCNC: 106 MEQ/L (ref 98–107)
CO2: 25 MEQ/L (ref 22–29)
CREAT SERPL-MCNC: 0.7 MG/DL (ref 0.5–0.9)
GFR AFRICAN AMERICAN: >60
GFR NON-AFRICAN AMERICAN: >60
GLUCOSE BLD-MCNC: 130 MG/DL (ref 74–109)
GLUCOSE BLD-MCNC: 141 MG/DL (ref 60–115)
GLUCOSE BLD-MCNC: 145 MG/DL (ref 60–115)
GLUCOSE BLD-MCNC: 156 MG/DL (ref 60–115)
PERFORMED ON: ABNORMAL
PHOSPHORUS: 3.5 MG/DL (ref 2.5–4.5)
POTASSIUM SERPL-SCNC: 4.9 MEQ/L (ref 3.5–5.1)
SODIUM BLD-SCNC: 142 MEQ/L (ref 132–144)

## 2018-05-17 PROCEDURE — 1210000000 HC MED SURG R&B

## 2018-05-17 PROCEDURE — 2580000003 HC RX 258: Performed by: PSYCHIATRY & NEUROLOGY

## 2018-05-17 PROCEDURE — 2580000003 HC RX 258: Performed by: INTERNAL MEDICINE

## 2018-05-17 PROCEDURE — 97112 NEUROMUSCULAR REEDUCATION: CPT

## 2018-05-17 PROCEDURE — C9113 INJ PANTOPRAZOLE SODIUM, VIA: HCPCS | Performed by: INTERNAL MEDICINE

## 2018-05-17 PROCEDURE — 6360000002 HC RX W HCPCS: Performed by: INTERNAL MEDICINE

## 2018-05-17 PROCEDURE — 80069 RENAL FUNCTION PANEL: CPT

## 2018-05-17 PROCEDURE — 6370000000 HC RX 637 (ALT 250 FOR IP): Performed by: INTERNAL MEDICINE

## 2018-05-17 PROCEDURE — 36592 COLLECT BLOOD FROM PICC: CPT

## 2018-05-17 PROCEDURE — 36415 COLL VENOUS BLD VENIPUNCTURE: CPT

## 2018-05-17 PROCEDURE — 97535 SELF CARE MNGMENT TRAINING: CPT

## 2018-05-17 PROCEDURE — 6370000000 HC RX 637 (ALT 250 FOR IP): Performed by: PSYCHIATRY & NEUROLOGY

## 2018-05-17 PROCEDURE — 99232 SBSQ HOSP IP/OBS MODERATE 35: CPT | Performed by: INTERNAL MEDICINE

## 2018-05-17 RX ORDER — MODAFINIL 100 MG/1
100 TABLET ORAL 2 TIMES DAILY
Qty: 60 TABLET | Refills: 0 | Status: SHIPPED | OUTPATIENT
Start: 2018-05-17 | End: 2018-06-16

## 2018-05-17 RX ORDER — HYDRALAZINE HYDROCHLORIDE 20 MG/ML
10 INJECTION INTRAMUSCULAR; INTRAVENOUS EVERY 6 HOURS PRN
Status: ON HOLD | COMMUNITY
Start: 2018-05-17 | End: 2018-07-26 | Stop reason: HOSPADM

## 2018-05-17 RX ORDER — BISACODYL 10 MG
10 SUPPOSITORY, RECTAL RECTAL DAILY PRN
DISCHARGE
Start: 2018-05-17 | End: 2018-06-16

## 2018-05-17 RX ORDER — DEXTROSE AND SODIUM CHLORIDE 5; .9 G/100ML; G/100ML
INJECTION, SOLUTION INTRAVENOUS CONTINUOUS
Status: DISCONTINUED | OUTPATIENT
Start: 2018-05-17 | End: 2018-05-18 | Stop reason: HOSPADM

## 2018-05-17 RX ORDER — DEXTROSE MONOHYDRATE 50 MG/ML
100 INJECTION, SOLUTION INTRAVENOUS PRN
DISCHARGE
Start: 2018-05-17 | End: 2020-08-07

## 2018-05-17 RX ADMIN — MEROPENEM: 1 INJECTION, POWDER, FOR SOLUTION INTRAVENOUS at 06:17

## 2018-05-17 RX ADMIN — MODAFINIL 100 MG: 100 TABLET ORAL at 10:30

## 2018-05-17 RX ADMIN — DEXTROSE AND SODIUM CHLORIDE: 5; 900 INJECTION, SOLUTION INTRAVENOUS at 02:53

## 2018-05-17 RX ADMIN — MEROPENEM: 1 INJECTION, POWDER, FOR SOLUTION INTRAVENOUS at 17:16

## 2018-05-17 RX ADMIN — Medication 10 ML: at 10:30

## 2018-05-17 RX ADMIN — PANTOPRAZOLE SODIUM 40 MG: 40 INJECTION, POWDER, FOR SOLUTION INTRAVENOUS at 10:30

## 2018-05-17 RX ADMIN — CALCIUM 500 MG: 500 TABLET ORAL at 10:30

## 2018-05-17 ASSESSMENT — PAIN SCALES - GENERAL
PAINLEVEL_OUTOF10: 0

## 2018-05-17 ASSESSMENT — PAIN SCALES - WONG BAKER: WONGBAKER_NUMERICALRESPONSE: 2

## 2018-05-17 ASSESSMENT — PAIN DESCRIPTION - ORIENTATION: ORIENTATION: RIGHT

## 2018-05-17 ASSESSMENT — PAIN DESCRIPTION - LOCATION: LOCATION: LEG

## 2018-05-17 ASSESSMENT — PAIN DESCRIPTION - PAIN TYPE: TYPE: CHRONIC PAIN

## 2018-05-17 ASSESSMENT — PAIN DESCRIPTION - DESCRIPTORS: DESCRIPTORS: DISCOMFORT

## 2018-05-18 VITALS
OXYGEN SATURATION: 97 % | SYSTOLIC BLOOD PRESSURE: 126 MMHG | HEART RATE: 83 BPM | DIASTOLIC BLOOD PRESSURE: 52 MMHG | HEIGHT: 65 IN | TEMPERATURE: 98.1 F | WEIGHT: 211.2 LBS | BODY MASS INDEX: 35.19 KG/M2 | RESPIRATION RATE: 16 BRPM

## 2018-05-18 LAB
ALBUMIN SERPL-MCNC: 2.2 G/DL (ref 3.9–4.9)
ALP BLD-CCNC: 159 U/L (ref 40–130)
ALT SERPL-CCNC: 53 U/L (ref 0–33)
ANION GAP SERPL CALCULATED.3IONS-SCNC: 11 MEQ/L (ref 7–13)
AST SERPL-CCNC: 72 U/L (ref 0–35)
BILIRUB SERPL-MCNC: 0.8 MG/DL (ref 0–1.2)
BUN BLDV-MCNC: 28 MG/DL (ref 8–23)
CALCIUM SERPL-MCNC: 8.4 MG/DL (ref 8.6–10.2)
CHLORIDE BLD-SCNC: 109 MEQ/L (ref 98–107)
CO2: 26 MEQ/L (ref 22–29)
CREAT SERPL-MCNC: 0.7 MG/DL (ref 0.5–0.9)
GFR AFRICAN AMERICAN: >60
GFR NON-AFRICAN AMERICAN: >60
GLOBULIN: 2.7 G/DL (ref 2.3–3.5)
GLUCOSE BLD-MCNC: 110 MG/DL (ref 74–109)
GLUCOSE BLD-MCNC: 131 MG/DL (ref 60–115)
GLUCOSE BLD-MCNC: 148 MG/DL (ref 60–115)
GLUCOSE BLD-MCNC: 94 MG/DL (ref 60–115)
HCT VFR BLD CALC: 26.1 % (ref 37–47)
HEMOGLOBIN: 8.7 G/DL (ref 12–16)
MAGNESIUM: 2.1 MG/DL (ref 1.7–2.3)
MCH RBC QN AUTO: 27.2 PG (ref 27–31.3)
MCHC RBC AUTO-ENTMCNC: 33.5 % (ref 33–37)
MCV RBC AUTO: 81.2 FL (ref 82–100)
PDW BLD-RTO: 20.2 % (ref 11.5–14.5)
PERFORMED ON: ABNORMAL
PERFORMED ON: ABNORMAL
PERFORMED ON: NORMAL
PHOSPHORUS: 2.7 MG/DL (ref 2.5–4.5)
PLATELET # BLD: 174 K/UL (ref 130–400)
POTASSIUM SERPL-SCNC: 4.5 MEQ/L (ref 3.5–5.1)
RBC # BLD: 3.21 M/UL (ref 4.2–5.4)
SODIUM BLD-SCNC: 146 MEQ/L (ref 132–144)
TOTAL PROTEIN: 4.9 G/DL (ref 6.4–8.1)
WBC # BLD: 7.9 K/UL (ref 4.8–10.8)

## 2018-05-18 PROCEDURE — 6360000002 HC RX W HCPCS: Performed by: INTERNAL MEDICINE

## 2018-05-18 PROCEDURE — 85027 COMPLETE CBC AUTOMATED: CPT

## 2018-05-18 PROCEDURE — 83735 ASSAY OF MAGNESIUM: CPT

## 2018-05-18 PROCEDURE — 80053 COMPREHEN METABOLIC PANEL: CPT

## 2018-05-18 PROCEDURE — 6370000000 HC RX 637 (ALT 250 FOR IP): Performed by: INTERNAL MEDICINE

## 2018-05-18 PROCEDURE — C9113 INJ PANTOPRAZOLE SODIUM, VIA: HCPCS | Performed by: INTERNAL MEDICINE

## 2018-05-18 PROCEDURE — 2580000003 HC RX 258: Performed by: PSYCHIATRY & NEUROLOGY

## 2018-05-18 PROCEDURE — 6370000000 HC RX 637 (ALT 250 FOR IP): Performed by: PSYCHIATRY & NEUROLOGY

## 2018-05-18 PROCEDURE — 84100 ASSAY OF PHOSPHORUS: CPT

## 2018-05-18 RX ADMIN — CALCIUM 500 MG: 500 TABLET ORAL at 09:44

## 2018-05-18 RX ADMIN — MODAFINIL 100 MG: 100 TABLET ORAL at 09:44

## 2018-05-18 RX ADMIN — PANTOPRAZOLE SODIUM 40 MG: 40 INJECTION, POWDER, FOR SOLUTION INTRAVENOUS at 09:44

## 2018-05-18 RX ADMIN — Medication 10 ML: at 09:44

## 2018-05-18 ASSESSMENT — PAIN SCALES - GENERAL
PAINLEVEL_OUTOF10: 0
PAINLEVEL_OUTOF10: 0

## 2018-05-19 LAB
HERPES SIMPLEX VIRUS BY PCR: NOT DETECTED
HSV SOURCE: NORMAL

## 2018-06-04 ENCOUNTER — HOSPITAL ENCOUNTER (INPATIENT)
Age: 73
LOS: 4 days | Discharge: NURSING, REHAB OR LONG-TERM CARE | DRG: 241 | End: 2018-06-08
Attending: INTERNAL MEDICINE | Admitting: INTERNAL MEDICINE
Payer: MEDICAID

## 2018-06-04 PROBLEM — K92.0 COFFEE GROUND EMESIS: Status: ACTIVE | Noted: 2018-06-04

## 2018-06-04 LAB
ALBUMIN SERPL-MCNC: 3.1 G/DL (ref 3.9–4.9)
ALP BLD-CCNC: 139 U/L (ref 40–130)
ALT SERPL-CCNC: 22 U/L (ref 0–33)
ANION GAP SERPL CALCULATED.3IONS-SCNC: 14 MEQ/L (ref 7–13)
ANION GAP SERPL CALCULATED.3IONS-SCNC: 15 MEQ/L (ref 7–13)
AST SERPL-CCNC: 11 U/L (ref 0–35)
BASOPHILS ABSOLUTE: 0.1 K/UL (ref 0–0.2)
BASOPHILS RELATIVE PERCENT: 0.9 %
BILIRUB SERPL-MCNC: 0.4 MG/DL (ref 0–1.2)
BUN BLDV-MCNC: 32 MG/DL (ref 8–23)
BUN BLDV-MCNC: 33 MG/DL (ref 8–23)
CALCIUM SERPL-MCNC: 9 MG/DL (ref 8.6–10.2)
CALCIUM SERPL-MCNC: 9.2 MG/DL (ref 8.6–10.2)
CHLORIDE BLD-SCNC: 101 MEQ/L (ref 98–107)
CHLORIDE BLD-SCNC: 102 MEQ/L (ref 98–107)
CO2: 23 MEQ/L (ref 22–29)
CO2: 24 MEQ/L (ref 22–29)
CREAT SERPL-MCNC: 0.75 MG/DL (ref 0.5–0.9)
CREAT SERPL-MCNC: 0.79 MG/DL (ref 0.5–0.9)
EOSINOPHILS ABSOLUTE: 0 K/UL (ref 0–0.7)
EOSINOPHILS RELATIVE PERCENT: 0.3 %
GFR AFRICAN AMERICAN: >60
GFR AFRICAN AMERICAN: >60
GFR NON-AFRICAN AMERICAN: >60
GFR NON-AFRICAN AMERICAN: >60
GLOBULIN: 4 G/DL (ref 2.3–3.5)
GLUCOSE BLD-MCNC: 112 MG/DL (ref 60–115)
GLUCOSE BLD-MCNC: 115 MG/DL (ref 74–109)
GLUCOSE BLD-MCNC: 116 MG/DL (ref 74–109)
HCT VFR BLD CALC: 28 % (ref 37–47)
HEMOGLOBIN: 9.2 G/DL (ref 12–16)
INR BLD: 1.1
LYMPHOCYTES ABSOLUTE: 1.6 K/UL (ref 1–4.8)
LYMPHOCYTES RELATIVE PERCENT: 21.1 %
MCH RBC QN AUTO: 27.3 PG (ref 27–31.3)
MCHC RBC AUTO-ENTMCNC: 32.8 % (ref 33–37)
MCV RBC AUTO: 83.1 FL (ref 82–100)
MONOCYTES ABSOLUTE: 0.7 K/UL (ref 0.2–0.8)
MONOCYTES RELATIVE PERCENT: 8.8 %
NEUTROPHILS ABSOLUTE: 5.1 K/UL (ref 1.4–6.5)
NEUTROPHILS RELATIVE PERCENT: 68.9 %
PDW BLD-RTO: 19.8 % (ref 11.5–14.5)
PERFORMED ON: NORMAL
PLATELET # BLD: 227 K/UL (ref 130–400)
POTASSIUM REFLEX MAGNESIUM: 4.1 MEQ/L (ref 3.5–5.1)
POTASSIUM SERPL-SCNC: 4.1 MEQ/L (ref 3.5–5.1)
PROTHROMBIN TIME: 11.6 SEC (ref 9.6–12.3)
RBC # BLD: 3.37 M/UL (ref 4.2–5.4)
SODIUM BLD-SCNC: 139 MEQ/L (ref 132–144)
SODIUM BLD-SCNC: 140 MEQ/L (ref 132–144)
TOTAL PROTEIN: 7.1 G/DL (ref 6.4–8.1)
WBC # BLD: 7.4 K/UL (ref 4.8–10.8)

## 2018-06-04 PROCEDURE — 36415 COLL VENOUS BLD VENIPUNCTURE: CPT

## 2018-06-04 PROCEDURE — 1210000000 HC MED SURG R&B

## 2018-06-04 PROCEDURE — 85025 COMPLETE CBC W/AUTO DIFF WBC: CPT

## 2018-06-04 PROCEDURE — 85610 PROTHROMBIN TIME: CPT

## 2018-06-04 PROCEDURE — 36430 TRANSFUSION BLD/BLD COMPNT: CPT

## 2018-06-04 PROCEDURE — 80053 COMPREHEN METABOLIC PANEL: CPT

## 2018-06-04 RX ORDER — FAMOTIDINE 20 MG/1
20 TABLET, FILM COATED ORAL 2 TIMES DAILY
Status: DISCONTINUED | OUTPATIENT
Start: 2018-06-04 | End: 2018-06-04

## 2018-06-04 RX ORDER — SODIUM CHLORIDE 0.9 % (FLUSH) 0.9 %
10 SYRINGE (ML) INJECTION EVERY 12 HOURS SCHEDULED
Status: DISCONTINUED | OUTPATIENT
Start: 2018-06-04 | End: 2018-06-08 | Stop reason: SDUPTHER

## 2018-06-04 RX ORDER — SODIUM CHLORIDE 9 MG/ML
INJECTION, SOLUTION INTRAVENOUS CONTINUOUS
Status: DISCONTINUED | OUTPATIENT
Start: 2018-06-04 | End: 2018-06-08

## 2018-06-04 RX ORDER — ONDANSETRON 2 MG/ML
4 INJECTION INTRAMUSCULAR; INTRAVENOUS EVERY 6 HOURS PRN
Status: DISCONTINUED | OUTPATIENT
Start: 2018-06-04 | End: 2018-06-08 | Stop reason: HOSPADM

## 2018-06-04 RX ORDER — SODIUM CHLORIDE 0.9 % (FLUSH) 0.9 %
10 SYRINGE (ML) INJECTION PRN
Status: DISCONTINUED | OUTPATIENT
Start: 2018-06-04 | End: 2018-06-08 | Stop reason: SDUPTHER

## 2018-06-04 ASSESSMENT — PAIN SCALES - GENERAL: PAINLEVEL_OUTOF10: 0

## 2018-06-05 LAB
ANISOCYTOSIS: ABNORMAL
BASOPHILS ABSOLUTE: 0.1 K/UL (ref 0–0.2)
BASOPHILS RELATIVE PERCENT: 1.2 %
EOSINOPHILS ABSOLUTE: 0 K/UL (ref 0–0.7)
EOSINOPHILS RELATIVE PERCENT: 0.5 %
HCT VFR BLD CALC: 25.2 % (ref 37–47)
HCT VFR BLD CALC: 26.5 % (ref 37–47)
HEMOGLOBIN: 8.4 G/DL (ref 12–16)
HEMOGLOBIN: 8.7 G/DL (ref 12–16)
LYMPHOCYTES ABSOLUTE: 2 K/UL (ref 1–4.8)
LYMPHOCYTES RELATIVE PERCENT: 24.4 %
MACROCYTES: ABNORMAL
MCH RBC QN AUTO: 28.1 PG (ref 27–31.3)
MCHC RBC AUTO-ENTMCNC: 33.2 % (ref 33–37)
MCV RBC AUTO: 84.6 FL (ref 82–100)
MICROCYTES: ABNORMAL
MONOCYTES ABSOLUTE: 0.9 K/UL (ref 0.2–0.8)
MONOCYTES RELATIVE PERCENT: 10.6 %
NEUTROPHILS ABSOLUTE: 5.3 K/UL (ref 1.4–6.5)
NEUTROPHILS RELATIVE PERCENT: 63.3 %
PDW BLD-RTO: 20.1 % (ref 11.5–14.5)
PLATELET # BLD: 214 K/UL (ref 130–400)
RBC # BLD: 3.11 M/UL (ref 4.2–5.4)
STOMATOCYTES: ABNORMAL
TARGET CELLS: ABNORMAL
WBC # BLD: 8.4 K/UL (ref 4.8–10.8)

## 2018-06-05 PROCEDURE — 6360000002 HC RX W HCPCS: Performed by: INTERNAL MEDICINE

## 2018-06-05 PROCEDURE — 85025 COMPLETE CBC W/AUTO DIFF WBC: CPT

## 2018-06-05 PROCEDURE — G8983 BODY POS D/C STATUS: HCPCS

## 2018-06-05 PROCEDURE — 36415 COLL VENOUS BLD VENIPUNCTURE: CPT

## 2018-06-05 PROCEDURE — 97165 OT EVAL LOW COMPLEX 30 MIN: CPT

## 2018-06-05 PROCEDURE — 1210000000 HC MED SURG R&B

## 2018-06-05 PROCEDURE — 2580000003 HC RX 258: Performed by: PHYSICIAN ASSISTANT

## 2018-06-05 PROCEDURE — 2580000003 HC RX 258: Performed by: INTERNAL MEDICINE

## 2018-06-05 PROCEDURE — G8981 BODY POS CURRENT STATUS: HCPCS

## 2018-06-05 PROCEDURE — G8982 BODY POS GOAL STATUS: HCPCS

## 2018-06-05 PROCEDURE — 6370000000 HC RX 637 (ALT 250 FOR IP): Performed by: INTERNAL MEDICINE

## 2018-06-05 PROCEDURE — C9113 INJ PANTOPRAZOLE SODIUM, VIA: HCPCS | Performed by: INTERNAL MEDICINE

## 2018-06-05 PROCEDURE — G8987 SELF CARE CURRENT STATUS: HCPCS

## 2018-06-05 PROCEDURE — 85018 HEMOGLOBIN: CPT

## 2018-06-05 PROCEDURE — G8989 SELF CARE D/C STATUS: HCPCS

## 2018-06-05 PROCEDURE — G8988 SELF CARE GOAL STATUS: HCPCS

## 2018-06-05 PROCEDURE — 97162 PT EVAL MOD COMPLEX 30 MIN: CPT

## 2018-06-05 PROCEDURE — 85014 HEMATOCRIT: CPT

## 2018-06-05 RX ORDER — SODIUM CHLORIDE 0.9 % (FLUSH) 0.9 %
10 SYRINGE (ML) INJECTION PRN
Status: DISCONTINUED | OUTPATIENT
Start: 2018-06-05 | End: 2018-06-08 | Stop reason: HOSPADM

## 2018-06-05 RX ORDER — DIVALPROEX SODIUM 125 MG/1
125 CAPSULE, COATED PELLETS ORAL DAILY
Status: DISCONTINUED | OUTPATIENT
Start: 2018-06-05 | End: 2018-06-08

## 2018-06-05 RX ORDER — 0.9 % SODIUM CHLORIDE 0.9 %
10 VIAL (ML) INJECTION EVERY 12 HOURS SCHEDULED
Status: CANCELLED | OUTPATIENT
Start: 2018-06-05

## 2018-06-05 RX ORDER — LIDOCAINE HYDROCHLORIDE 20 MG/ML
5 INJECTION, SOLUTION INFILTRATION; PERINEURAL ONCE
Status: COMPLETED | OUTPATIENT
Start: 2018-06-05 | End: 2018-06-06

## 2018-06-05 RX ORDER — CALCIUM CARBONATE 500(1250)
1 TABLET ORAL DAILY
Status: DISCONTINUED | OUTPATIENT
Start: 2018-06-05 | End: 2018-06-08 | Stop reason: HOSPADM

## 2018-06-05 RX ORDER — MODAFINIL 100 MG/1
100 TABLET ORAL 2 TIMES DAILY
Status: DISCONTINUED | OUTPATIENT
Start: 2018-06-05 | End: 2018-06-08 | Stop reason: HOSPADM

## 2018-06-05 RX ORDER — 0.9 % SODIUM CHLORIDE 0.9 %
10 VIAL (ML) INJECTION PRN
Status: CANCELLED | OUTPATIENT
Start: 2018-06-05

## 2018-06-05 RX ORDER — SODIUM CHLORIDE 0.9 % (FLUSH) 0.9 %
10 SYRINGE (ML) INJECTION EVERY 12 HOURS SCHEDULED
Status: DISCONTINUED | OUTPATIENT
Start: 2018-06-05 | End: 2018-06-08 | Stop reason: HOSPADM

## 2018-06-05 RX ORDER — SODIUM CHLORIDE 9 MG/ML
250 INJECTION, SOLUTION INTRAVENOUS ONCE
Status: COMPLETED | OUTPATIENT
Start: 2018-06-05 | End: 2018-06-06

## 2018-06-05 RX ADMIN — CALCIUM 500 MG: 500 TABLET ORAL at 09:57

## 2018-06-05 RX ADMIN — DIVALPROEX SODIUM 125 MG: 125 CAPSULE, COATED PELLETS ORAL at 09:57

## 2018-06-05 RX ADMIN — SODIUM CHLORIDE 8 MG/HR: 9 INJECTION, SOLUTION INTRAVENOUS at 11:20

## 2018-06-05 RX ADMIN — SODIUM CHLORIDE: 9 INJECTION, SOLUTION INTRAVENOUS at 03:40

## 2018-06-05 RX ADMIN — SODIUM CHLORIDE: 9 INJECTION, SOLUTION INTRAVENOUS at 14:30

## 2018-06-05 RX ADMIN — SODIUM CHLORIDE 8 MG/HR: 9 INJECTION, SOLUTION INTRAVENOUS at 02:40

## 2018-06-06 ENCOUNTER — APPOINTMENT (OUTPATIENT)
Dept: INTERVENTIONAL RADIOLOGY/VASCULAR | Age: 73
DRG: 241 | End: 2018-06-06
Attending: INTERNAL MEDICINE
Payer: MEDICAID

## 2018-06-06 LAB
ABO/RH: NORMAL
ANION GAP SERPL CALCULATED.3IONS-SCNC: 15 MEQ/L (ref 7–13)
ANTIBODY SCREEN: NORMAL
BASOPHILS ABSOLUTE: 0.1 K/UL (ref 0–0.2)
BASOPHILS RELATIVE PERCENT: 0.7 %
BLOOD BANK DISPENSE STATUS: NORMAL
BLOOD BANK DISPENSE STATUS: NORMAL
BLOOD BANK PRODUCT CODE: NORMAL
BLOOD BANK PRODUCT CODE: NORMAL
BPU ID: NORMAL
BPU ID: NORMAL
BUN BLDV-MCNC: 36 MG/DL (ref 8–23)
CALCIUM SERPL-MCNC: 8.8 MG/DL (ref 8.6–10.2)
CHLORIDE BLD-SCNC: 110 MEQ/L (ref 98–107)
CO2: 21 MEQ/L (ref 22–29)
CREAT SERPL-MCNC: 1.15 MG/DL (ref 0.5–0.9)
DESCRIPTION BLOOD BANK: NORMAL
DESCRIPTION BLOOD BANK: NORMAL
EOSINOPHILS ABSOLUTE: 0.1 K/UL (ref 0–0.7)
EOSINOPHILS RELATIVE PERCENT: 1.3 %
GFR AFRICAN AMERICAN: 55.8
GFR NON-AFRICAN AMERICAN: 46.1
GLUCOSE BLD-MCNC: 67 MG/DL (ref 74–109)
HCT VFR BLD CALC: 22.8 % (ref 37–47)
HCT VFR BLD CALC: 24.2 % (ref 37–47)
HEMOGLOBIN: 7.4 G/DL (ref 12–16)
HEMOGLOBIN: 7.9 G/DL (ref 12–16)
LYMPHOCYTES ABSOLUTE: 1.9 K/UL (ref 1–4.8)
LYMPHOCYTES RELATIVE PERCENT: 22.7 %
MCH RBC QN AUTO: 27.3 PG (ref 27–31.3)
MCHC RBC AUTO-ENTMCNC: 32.4 % (ref 33–37)
MCV RBC AUTO: 84.4 FL (ref 82–100)
MONOCYTES ABSOLUTE: 0.9 K/UL (ref 0.2–0.8)
MONOCYTES RELATIVE PERCENT: 11.2 %
NEUTROPHILS ABSOLUTE: 5.3 K/UL (ref 1.4–6.5)
NEUTROPHILS RELATIVE PERCENT: 64.1 %
PDW BLD-RTO: 20 % (ref 11.5–14.5)
PLATELET # BLD: 209 K/UL (ref 130–400)
POTASSIUM SERPL-SCNC: 3.8 MEQ/L (ref 3.5–5.1)
RBC # BLD: 2.7 M/UL (ref 4.2–5.4)
SODIUM BLD-SCNC: 146 MEQ/L (ref 132–144)
WBC # BLD: 8.2 K/UL (ref 4.8–10.8)

## 2018-06-06 PROCEDURE — 86901 BLOOD TYPING SEROLOGIC RH(D): CPT

## 2018-06-06 PROCEDURE — 77001 FLUOROGUIDE FOR VEIN DEVICE: CPT | Performed by: RADIOLOGY

## 2018-06-06 PROCEDURE — 86850 RBC ANTIBODY SCREEN: CPT

## 2018-06-06 PROCEDURE — 2580000003 HC RX 258: Performed by: INTERNAL MEDICINE

## 2018-06-06 PROCEDURE — 86900 BLOOD TYPING SEROLOGIC ABO: CPT

## 2018-06-06 PROCEDURE — 2500000003 HC RX 250 WO HCPCS: Performed by: INTERNAL MEDICINE

## 2018-06-06 PROCEDURE — 02HV33Z INSERTION OF INFUSION DEVICE INTO SUPERIOR VENA CAVA, PERCUTANEOUS APPROACH: ICD-10-PCS | Performed by: RADIOLOGY

## 2018-06-06 PROCEDURE — 85014 HEMATOCRIT: CPT

## 2018-06-06 PROCEDURE — 85025 COMPLETE CBC W/AUTO DIFF WBC: CPT

## 2018-06-06 PROCEDURE — 1210000000 HC MED SURG R&B

## 2018-06-06 PROCEDURE — 86923 COMPATIBILITY TEST ELECTRIC: CPT

## 2018-06-06 PROCEDURE — 85018 HEMOGLOBIN: CPT

## 2018-06-06 PROCEDURE — 2580000003 HC RX 258: Performed by: PHYSICIAN ASSISTANT

## 2018-06-06 PROCEDURE — 36430 TRANSFUSION BLD/BLD COMPNT: CPT

## 2018-06-06 PROCEDURE — 36415 COLL VENOUS BLD VENIPUNCTURE: CPT

## 2018-06-06 PROCEDURE — 36569 INSJ PICC 5 YR+ W/O IMAGING: CPT | Performed by: RADIOLOGY

## 2018-06-06 PROCEDURE — 76937 US GUIDE VASCULAR ACCESS: CPT | Performed by: RADIOLOGY

## 2018-06-06 PROCEDURE — C1751 CATH, INF, PER/CENT/MIDLINE: HCPCS

## 2018-06-06 PROCEDURE — 80048 BASIC METABOLIC PNL TOTAL CA: CPT

## 2018-06-06 PROCEDURE — P9016 RBC LEUKOCYTES REDUCED: HCPCS

## 2018-06-06 RX ORDER — 0.9 % SODIUM CHLORIDE 0.9 %
250 INTRAVENOUS SOLUTION INTRAVENOUS ONCE
Status: COMPLETED | OUTPATIENT
Start: 2018-06-06 | End: 2018-06-07

## 2018-06-06 RX ORDER — ONDANSETRON 2 MG/ML
4 INJECTION INTRAMUSCULAR; INTRAVENOUS
Status: ACTIVE | OUTPATIENT
Start: 2018-06-06 | End: 2018-06-06

## 2018-06-06 RX ADMIN — SODIUM CHLORIDE: 9 INJECTION, SOLUTION INTRAVENOUS at 14:09

## 2018-06-06 RX ADMIN — SODIUM CHLORIDE 250 ML: 9 INJECTION, SOLUTION INTRAVENOUS at 16:22

## 2018-06-06 RX ADMIN — SODIUM CHLORIDE 250 ML: 9 INJECTION, SOLUTION INTRAVENOUS at 09:48

## 2018-06-06 RX ADMIN — SODIUM CHLORIDE: 9 INJECTION, SOLUTION INTRAVENOUS at 02:13

## 2018-06-06 RX ADMIN — LIDOCAINE HYDROCHLORIDE 10 ML: 20 INJECTION, SOLUTION INFILTRATION; PERINEURAL at 09:45

## 2018-06-06 ASSESSMENT — PAIN SCALES - GENERAL: PAINLEVEL_OUTOF10: 0

## 2018-06-07 ENCOUNTER — ANESTHESIA (OUTPATIENT)
Dept: OPERATING ROOM | Age: 73
DRG: 241 | End: 2018-06-07
Payer: MEDICAID

## 2018-06-07 ENCOUNTER — ANESTHESIA EVENT (OUTPATIENT)
Dept: OPERATING ROOM | Age: 73
DRG: 241 | End: 2018-06-07
Payer: MEDICAID

## 2018-06-07 VITALS — OXYGEN SATURATION: 100 % | DIASTOLIC BLOOD PRESSURE: 57 MMHG | SYSTOLIC BLOOD PRESSURE: 124 MMHG

## 2018-06-07 LAB
ANION GAP SERPL CALCULATED.3IONS-SCNC: 14 MEQ/L (ref 7–13)
BASOPHILS ABSOLUTE: 0 K/UL (ref 0–0.2)
BASOPHILS RELATIVE PERCENT: 0.5 %
BUN BLDV-MCNC: 21 MG/DL (ref 8–23)
CALCIUM SERPL-MCNC: 8.5 MG/DL (ref 8.6–10.2)
CHLORIDE BLD-SCNC: 115 MEQ/L (ref 98–107)
CO2: 21 MEQ/L (ref 22–29)
CREAT SERPL-MCNC: 0.78 MG/DL (ref 0.5–0.9)
EOSINOPHILS ABSOLUTE: 0 K/UL (ref 0–0.7)
EOSINOPHILS RELATIVE PERCENT: 0.4 %
GFR AFRICAN AMERICAN: >60
GFR NON-AFRICAN AMERICAN: >60
GLUCOSE BLD-MCNC: 68 MG/DL (ref 74–109)
GLUCOSE BLD-MCNC: 69 MG/DL (ref 60–115)
GLUCOSE BLD-MCNC: 69 MG/DL (ref 60–115)
GLUCOSE BLD-MCNC: 71 MG/DL (ref 60–115)
HCT VFR BLD CALC: 30 % (ref 37–47)
HEMOGLOBIN: 9.9 G/DL (ref 12–16)
LYMPHOCYTES ABSOLUTE: 1.1 K/UL (ref 1–4.8)
LYMPHOCYTES RELATIVE PERCENT: 13 %
MCH RBC QN AUTO: 28.3 PG (ref 27–31.3)
MCHC RBC AUTO-ENTMCNC: 33.2 % (ref 33–37)
MCV RBC AUTO: 85.3 FL (ref 82–100)
MONOCYTES ABSOLUTE: 0.8 K/UL (ref 0.2–0.8)
MONOCYTES RELATIVE PERCENT: 10.1 %
NEUTROPHILS ABSOLUTE: 6.2 K/UL (ref 1.4–6.5)
NEUTROPHILS RELATIVE PERCENT: 76 %
PDW BLD-RTO: 17.4 % (ref 11.5–14.5)
PERFORMED ON: NORMAL
PLATELET # BLD: 170 K/UL (ref 130–400)
POTASSIUM SERPL-SCNC: 3.6 MEQ/L (ref 3.5–5.1)
RBC # BLD: 3.51 M/UL (ref 4.2–5.4)
SODIUM BLD-SCNC: 150 MEQ/L (ref 132–144)
WBC # BLD: 8.2 K/UL (ref 4.8–10.8)

## 2018-06-07 PROCEDURE — 7100000000 HC PACU RECOVERY - FIRST 15 MIN: Performed by: SPECIALIST

## 2018-06-07 PROCEDURE — 0DB68ZX EXCISION OF STOMACH, VIA NATURAL OR ARTIFICIAL OPENING ENDOSCOPIC, DIAGNOSTIC: ICD-10-PCS | Performed by: SPECIALIST

## 2018-06-07 PROCEDURE — 3700000000 HC ANESTHESIA ATTENDED CARE: Performed by: SPECIALIST

## 2018-06-07 PROCEDURE — 80048 BASIC METABOLIC PNL TOTAL CA: CPT

## 2018-06-07 PROCEDURE — 7100000001 HC PACU RECOVERY - ADDTL 15 MIN: Performed by: SPECIALIST

## 2018-06-07 PROCEDURE — 87077 CULTURE AEROBIC IDENTIFY: CPT

## 2018-06-07 PROCEDURE — 2580000003 HC RX 258: Performed by: INTERNAL MEDICINE

## 2018-06-07 PROCEDURE — 2580000003 HC RX 258: Performed by: SPECIALIST

## 2018-06-07 PROCEDURE — 2780000010 HC IMPLANT OTHER: Performed by: SPECIALIST

## 2018-06-07 PROCEDURE — 3609017100 HC EGD: Performed by: SPECIALIST

## 2018-06-07 PROCEDURE — 6360000002 HC RX W HCPCS: Performed by: INTERNAL MEDICINE

## 2018-06-07 PROCEDURE — 36415 COLL VENOUS BLD VENIPUNCTURE: CPT

## 2018-06-07 PROCEDURE — 1210000000 HC MED SURG R&B

## 2018-06-07 PROCEDURE — C9113 INJ PANTOPRAZOLE SODIUM, VIA: HCPCS | Performed by: INTERNAL MEDICINE

## 2018-06-07 PROCEDURE — 2580000003 HC RX 258: Performed by: PHYSICIAN ASSISTANT

## 2018-06-07 PROCEDURE — 3700000001 HC ADD 15 MINUTES (ANESTHESIA): Performed by: SPECIALIST

## 2018-06-07 PROCEDURE — 85025 COMPLETE CBC W/AUTO DIFF WBC: CPT

## 2018-06-07 PROCEDURE — 6360000002 HC RX W HCPCS: Performed by: NURSE ANESTHETIST, CERTIFIED REGISTERED

## 2018-06-07 DEVICE — CLIPPING DEVICE
Type: IMPLANTABLE DEVICE | Site: STOMACH | Status: FUNCTIONAL
Brand: RESOLUTION CLIP

## 2018-06-07 RX ORDER — DIPHENHYDRAMINE HYDROCHLORIDE 50 MG/ML
12.5 INJECTION INTRAMUSCULAR; INTRAVENOUS
Status: DISCONTINUED | OUTPATIENT
Start: 2018-06-07 | End: 2018-06-07 | Stop reason: HOSPADM

## 2018-06-07 RX ORDER — SODIUM CHLORIDE 0.9 % (FLUSH) 0.9 %
10 SYRINGE (ML) INJECTION PRN
Status: DISCONTINUED | OUTPATIENT
Start: 2018-06-07 | End: 2018-06-07 | Stop reason: HOSPADM

## 2018-06-07 RX ORDER — MEPERIDINE HYDROCHLORIDE 25 MG/ML
12.5 INJECTION INTRAMUSCULAR; INTRAVENOUS; SUBCUTANEOUS EVERY 5 MIN PRN
Status: DISCONTINUED | OUTPATIENT
Start: 2018-06-07 | End: 2018-06-07 | Stop reason: HOSPADM

## 2018-06-07 RX ORDER — ONDANSETRON 2 MG/ML
4 INJECTION INTRAMUSCULAR; INTRAVENOUS
Status: DISCONTINUED | OUTPATIENT
Start: 2018-06-07 | End: 2018-06-07 | Stop reason: HOSPADM

## 2018-06-07 RX ORDER — METOCLOPRAMIDE HYDROCHLORIDE 5 MG/ML
10 INJECTION INTRAMUSCULAR; INTRAVENOUS
Status: DISCONTINUED | OUTPATIENT
Start: 2018-06-07 | End: 2018-06-07 | Stop reason: HOSPADM

## 2018-06-07 RX ORDER — HYDROCODONE BITARTRATE AND ACETAMINOPHEN 5; 325 MG/1; MG/1
2 TABLET ORAL PRN
Status: DISCONTINUED | OUTPATIENT
Start: 2018-06-07 | End: 2018-06-07 | Stop reason: HOSPADM

## 2018-06-07 RX ORDER — MAGNESIUM HYDROXIDE 1200 MG/15ML
LIQUID ORAL PRN
Status: DISCONTINUED | OUTPATIENT
Start: 2018-06-07 | End: 2018-06-07 | Stop reason: HOSPADM

## 2018-06-07 RX ORDER — HYDROCODONE BITARTRATE AND ACETAMINOPHEN 5; 325 MG/1; MG/1
1 TABLET ORAL PRN
Status: DISCONTINUED | OUTPATIENT
Start: 2018-06-07 | End: 2018-06-07 | Stop reason: HOSPADM

## 2018-06-07 RX ORDER — LIDOCAINE HYDROCHLORIDE 10 MG/ML
1 INJECTION, SOLUTION EPIDURAL; INFILTRATION; INTRACAUDAL; PERINEURAL
Status: DISCONTINUED | OUTPATIENT
Start: 2018-06-07 | End: 2018-06-07 | Stop reason: HOSPADM

## 2018-06-07 RX ORDER — SODIUM CHLORIDE 0.9 % (FLUSH) 0.9 %
10 SYRINGE (ML) INJECTION EVERY 12 HOURS SCHEDULED
Status: DISCONTINUED | OUTPATIENT
Start: 2018-06-07 | End: 2018-06-07 | Stop reason: HOSPADM

## 2018-06-07 RX ORDER — SODIUM CHLORIDE 9 MG/ML
INJECTION, SOLUTION INTRAVENOUS CONTINUOUS
Status: DISCONTINUED | OUTPATIENT
Start: 2018-06-07 | End: 2018-06-08

## 2018-06-07 RX ORDER — FENTANYL CITRATE 50 UG/ML
50 INJECTION, SOLUTION INTRAMUSCULAR; INTRAVENOUS EVERY 10 MIN PRN
Status: DISCONTINUED | OUTPATIENT
Start: 2018-06-07 | End: 2018-06-07 | Stop reason: HOSPADM

## 2018-06-07 RX ORDER — PROPOFOL 10 MG/ML
INJECTION, EMULSION INTRAVENOUS PRN
Status: DISCONTINUED | OUTPATIENT
Start: 2018-06-07 | End: 2018-06-07 | Stop reason: SDUPTHER

## 2018-06-07 RX ADMIN — Medication 10 ML: at 21:20

## 2018-06-07 RX ADMIN — PROPOFOL 10 MG: 10 INJECTION, EMULSION INTRAVENOUS at 13:42

## 2018-06-07 RX ADMIN — PROPOFOL 30 MG: 10 INJECTION, EMULSION INTRAVENOUS at 13:41

## 2018-06-07 RX ADMIN — PROPOFOL 30 MG: 10 INJECTION, EMULSION INTRAVENOUS at 13:44

## 2018-06-07 RX ADMIN — SODIUM CHLORIDE: 9 INJECTION, SOLUTION INTRAVENOUS at 22:19

## 2018-06-07 RX ADMIN — PROPOFOL 20 MG: 10 INJECTION, EMULSION INTRAVENOUS at 13:46

## 2018-06-07 RX ADMIN — SODIUM CHLORIDE: 9 INJECTION, SOLUTION INTRAVENOUS at 01:25

## 2018-06-07 RX ADMIN — SODIUM CHLORIDE: 9 INJECTION, SOLUTION INTRAVENOUS at 12:56

## 2018-06-07 RX ADMIN — SODIUM CHLORIDE 8 MG/HR: 9 INJECTION, SOLUTION INTRAVENOUS at 22:46

## 2018-06-07 RX ADMIN — SODIUM CHLORIDE 8 MG/HR: 9 INJECTION, SOLUTION INTRAVENOUS at 01:45

## 2018-06-07 ASSESSMENT — PULMONARY FUNCTION TESTS
PIF_VALUE: 0
PIF_VALUE: 1
PIF_VALUE: 0

## 2018-06-08 VITALS
DIASTOLIC BLOOD PRESSURE: 44 MMHG | OXYGEN SATURATION: 98 % | RESPIRATION RATE: 18 BRPM | WEIGHT: 205.69 LBS | BODY MASS INDEX: 34.27 KG/M2 | SYSTOLIC BLOOD PRESSURE: 135 MMHG | HEIGHT: 65 IN | TEMPERATURE: 97.4 F | HEART RATE: 80 BPM

## 2018-06-08 LAB
ANION GAP SERPL CALCULATED.3IONS-SCNC: 16 MEQ/L (ref 7–13)
BASOPHILS ABSOLUTE: 0.1 K/UL (ref 0–0.2)
BASOPHILS RELATIVE PERCENT: 0.9 %
BETA-HYDROXYBUTYRATE: 25.4 MG/DL (ref 0.2–2.8)
BUN BLDV-MCNC: 15 MG/DL (ref 8–23)
CALCIUM SERPL-MCNC: 8.9 MG/DL (ref 8.6–10.2)
CHLORIDE BLD-SCNC: 119 MEQ/L (ref 98–107)
CLOTEST: NEGATIVE
CO2: 17 MEQ/L (ref 22–29)
CREAT SERPL-MCNC: 0.76 MG/DL (ref 0.5–0.9)
EOSINOPHILS ABSOLUTE: 0 K/UL (ref 0–0.7)
EOSINOPHILS RELATIVE PERCENT: 0.4 %
GFR AFRICAN AMERICAN: >60
GFR NON-AFRICAN AMERICAN: >60
GLUCOSE BLD-MCNC: 67 MG/DL (ref 74–109)
GLUCOSE BLD-MCNC: 75 MG/DL (ref 60–115)
HCT VFR BLD CALC: 29.1 % (ref 37–47)
HCT VFR BLD CALC: 31.3 % (ref 37–47)
HEMOGLOBIN: 10.4 G/DL (ref 12–16)
HEMOGLOBIN: 9.7 G/DL (ref 12–16)
LYMPHOCYTES ABSOLUTE: 1.1 K/UL (ref 1–4.8)
LYMPHOCYTES RELATIVE PERCENT: 12.3 %
MCH RBC QN AUTO: 28.7 PG (ref 27–31.3)
MCHC RBC AUTO-ENTMCNC: 33.2 % (ref 33–37)
MCV RBC AUTO: 86.4 FL (ref 82–100)
MONOCYTES ABSOLUTE: 0.7 K/UL (ref 0.2–0.8)
MONOCYTES RELATIVE PERCENT: 7.7 %
NEUTROPHILS ABSOLUTE: 7.1 K/UL (ref 1.4–6.5)
NEUTROPHILS RELATIVE PERCENT: 78.7 %
PDW BLD-RTO: 17.6 % (ref 11.5–14.5)
PERFORMED ON: NORMAL
PLATELET # BLD: 190 K/UL (ref 130–400)
POTASSIUM SERPL-SCNC: 3.4 MEQ/L (ref 3.5–5.1)
RBC # BLD: 3.38 M/UL (ref 4.2–5.4)
SODIUM BLD-SCNC: 152 MEQ/L (ref 132–144)
WBC # BLD: 9 K/UL (ref 4.8–10.8)

## 2018-06-08 PROCEDURE — 80048 BASIC METABOLIC PNL TOTAL CA: CPT

## 2018-06-08 PROCEDURE — 2580000003 HC RX 258: Performed by: INTERNAL MEDICINE

## 2018-06-08 PROCEDURE — G8996 SWALLOW CURRENT STATUS: HCPCS

## 2018-06-08 PROCEDURE — 92610 EVALUATE SWALLOWING FUNCTION: CPT

## 2018-06-08 PROCEDURE — G8997 SWALLOW GOAL STATUS: HCPCS

## 2018-06-08 PROCEDURE — 36415 COLL VENOUS BLD VENIPUNCTURE: CPT

## 2018-06-08 PROCEDURE — 2500000003 HC RX 250 WO HCPCS: Performed by: INTERNAL MEDICINE

## 2018-06-08 PROCEDURE — 82010 KETONE BODYS QUAN: CPT

## 2018-06-08 PROCEDURE — 6360000002 HC RX W HCPCS: Performed by: SPECIALIST

## 2018-06-08 PROCEDURE — 85018 HEMOGLOBIN: CPT

## 2018-06-08 PROCEDURE — 6370000000 HC RX 637 (ALT 250 FOR IP): Performed by: INTERNAL MEDICINE

## 2018-06-08 PROCEDURE — 85014 HEMATOCRIT: CPT

## 2018-06-08 PROCEDURE — 2580000003 HC RX 258: Performed by: SPECIALIST

## 2018-06-08 PROCEDURE — 85025 COMPLETE CBC W/AUTO DIFF WBC: CPT

## 2018-06-08 RX ORDER — POTASSIUM CHLORIDE 20 MEQ/1
40 TABLET, EXTENDED RELEASE ORAL ONCE
Status: COMPLETED | OUTPATIENT
Start: 2018-06-08 | End: 2018-06-08

## 2018-06-08 RX ORDER — SUCRALFATE 1 G/1
1 TABLET ORAL EVERY 6 HOURS SCHEDULED
Status: DISCONTINUED | OUTPATIENT
Start: 2018-06-08 | End: 2018-06-08 | Stop reason: HOSPADM

## 2018-06-08 RX ORDER — MAGNESIUM SULFATE 1 G/100ML
1 INJECTION INTRAVENOUS PRN
Status: DISCONTINUED | OUTPATIENT
Start: 2018-06-08 | End: 2018-06-08 | Stop reason: HOSPADM

## 2018-06-08 RX ORDER — DEXTROSE MONOHYDRATE 50 MG/ML
100 INJECTION, SOLUTION INTRAVENOUS PRN
Status: DISCONTINUED | OUTPATIENT
Start: 2018-06-08 | End: 2018-06-08 | Stop reason: HOSPADM

## 2018-06-08 RX ORDER — DEXTROSE, SODIUM CHLORIDE, AND POTASSIUM CHLORIDE 5; .45; .15 G/100ML; G/100ML; G/100ML
INJECTION INTRAVENOUS CONTINUOUS
Status: DISCONTINUED | OUTPATIENT
Start: 2018-06-08 | End: 2018-06-08 | Stop reason: HOSPADM

## 2018-06-08 RX ORDER — POTASSIUM CHLORIDE 7.45 MG/ML
10 INJECTION INTRAVENOUS PRN
Status: DISCONTINUED | OUTPATIENT
Start: 2018-06-08 | End: 2018-06-08 | Stop reason: HOSPADM

## 2018-06-08 RX ORDER — PANTOPRAZOLE SODIUM 40 MG/10ML
40 INJECTION, POWDER, LYOPHILIZED, FOR SOLUTION INTRAVENOUS DAILY
Status: DISCONTINUED | OUTPATIENT
Start: 2018-06-08 | End: 2018-06-08

## 2018-06-08 RX ORDER — THIAMINE HYDROCHLORIDE 100 MG/ML
500 INJECTION, SOLUTION INTRAMUSCULAR; INTRAVENOUS ONCE
Status: DISCONTINUED | OUTPATIENT
Start: 2018-06-08 | End: 2018-06-08 | Stop reason: ALTCHOICE

## 2018-06-08 RX ORDER — DIVALPROEX SODIUM 125 MG/1
125 CAPSULE, COATED PELLETS ORAL DAILY
Status: DISCONTINUED | OUTPATIENT
Start: 2018-06-09 | End: 2018-06-08 | Stop reason: HOSPADM

## 2018-06-08 RX ORDER — 0.9 % SODIUM CHLORIDE 0.9 %
10 VIAL (ML) INJECTION DAILY
Status: DISCONTINUED | OUTPATIENT
Start: 2018-06-08 | End: 2018-06-08

## 2018-06-08 RX ORDER — NICOTINE POLACRILEX 4 MG
15 LOZENGE BUCCAL PRN
Status: DISCONTINUED | OUTPATIENT
Start: 2018-06-08 | End: 2018-06-08 | Stop reason: HOSPADM

## 2018-06-08 RX ORDER — DEXTROSE MONOHYDRATE 25 G/50ML
12.5 INJECTION, SOLUTION INTRAVENOUS PRN
Status: DISCONTINUED | OUTPATIENT
Start: 2018-06-08 | End: 2018-06-08 | Stop reason: HOSPADM

## 2018-06-08 RX ORDER — PANTOPRAZOLE SODIUM 40 MG/1
40 GRANULE, DELAYED RELEASE ORAL
Status: DISCONTINUED | OUTPATIENT
Start: 2018-06-08 | End: 2018-06-08 | Stop reason: HOSPADM

## 2018-06-08 RX ADMIN — THIAMINE HYDROCHLORIDE: 100 INJECTION, SOLUTION INTRAMUSCULAR; INTRAVENOUS at 09:18

## 2018-06-08 RX ADMIN — POTASSIUM CHLORIDE 40 MEQ: 20 TABLET, EXTENDED RELEASE ORAL at 10:40

## 2018-06-08 RX ADMIN — CALCIUM 500 MG: 500 TABLET ORAL at 09:17

## 2018-06-08 RX ADMIN — VALPROATE SODIUM 500 MG: 100 INJECTION, SOLUTION INTRAVENOUS at 09:19

## 2018-06-08 RX ADMIN — MODAFINIL 100 MG: 100 TABLET ORAL at 09:17

## 2018-06-08 RX ADMIN — POTASSIUM CHLORIDE, DEXTROSE MONOHYDRATE AND SODIUM CHLORIDE: 150; 5; 450 INJECTION, SOLUTION INTRAVENOUS at 09:18

## 2018-06-08 RX ADMIN — SUCRALFATE 1 G: 1 TABLET ORAL at 10:40

## 2018-06-08 RX ADMIN — Medication 10 ML: at 09:18

## 2018-07-03 ENCOUNTER — APPOINTMENT (OUTPATIENT)
Dept: GENERAL RADIOLOGY | Age: 73
End: 2018-07-03
Payer: MEDICAID

## 2018-07-03 ENCOUNTER — HOSPITAL ENCOUNTER (EMERGENCY)
Age: 73
Discharge: HOME | End: 2018-07-03
Attending: EMERGENCY MEDICINE
Payer: MEDICAID

## 2018-07-03 VITALS
TEMPERATURE: 96 F | BODY MASS INDEX: 33.82 KG/M2 | HEART RATE: 74 BPM | RESPIRATION RATE: 18 BRPM | OXYGEN SATURATION: 100 % | SYSTOLIC BLOOD PRESSURE: 150 MMHG | DIASTOLIC BLOOD PRESSURE: 95 MMHG | WEIGHT: 203 LBS

## 2018-07-03 DIAGNOSIS — K92.0 HEMATEMESIS WITHOUT NAUSEA: Primary | ICD-10-CM

## 2018-07-03 LAB
ABO/RH: NORMAL
ALBUMIN SERPL-MCNC: 2.6 G/DL (ref 3.9–4.9)
ALP BLD-CCNC: 90 U/L (ref 40–130)
ALT SERPL-CCNC: 7 U/L (ref 0–33)
ANION GAP SERPL CALCULATED.3IONS-SCNC: 15 MEQ/L (ref 7–13)
ANTIBODY SCREEN: NORMAL
AST SERPL-CCNC: 10 U/L (ref 0–35)
BASOPHILS ABSOLUTE: 0 K/UL (ref 0–0.2)
BASOPHILS RELATIVE PERCENT: 0.5 %
BILIRUB SERPL-MCNC: 0.4 MG/DL (ref 0–1.2)
BUN BLDV-MCNC: 13 MG/DL (ref 8–23)
CALCIUM SERPL-MCNC: 9 MG/DL (ref 8.6–10.2)
CHLORIDE BLD-SCNC: 102 MEQ/L (ref 98–107)
CO2: 25 MEQ/L (ref 22–29)
CREAT SERPL-MCNC: 0.6 MG/DL (ref 0.5–0.9)
EOSINOPHILS ABSOLUTE: 0 K/UL (ref 0–0.7)
EOSINOPHILS RELATIVE PERCENT: 0.1 %
GFR AFRICAN AMERICAN: >60
GFR NON-AFRICAN AMERICAN: >60
GLOBULIN: 3.8 G/DL (ref 2.3–3.5)
GLUCOSE BLD-MCNC: 124 MG/DL (ref 74–109)
HCT VFR BLD CALC: 38.3 % (ref 37–47)
HEMOGLOBIN: 12.8 G/DL (ref 12–16)
INR BLD: 1
LYMPHOCYTES ABSOLUTE: 0.9 K/UL (ref 1–4.8)
LYMPHOCYTES RELATIVE PERCENT: 17.1 %
MCH RBC QN AUTO: 27.9 PG (ref 27–31.3)
MCHC RBC AUTO-ENTMCNC: 33.4 % (ref 33–37)
MCV RBC AUTO: 83.5 FL (ref 82–100)
MONOCYTES ABSOLUTE: 0.1 K/UL (ref 0.2–0.8)
MONOCYTES RELATIVE PERCENT: 2 %
NEUTROPHILS ABSOLUTE: 4.3 K/UL (ref 1.4–6.5)
NEUTROPHILS RELATIVE PERCENT: 80.3 %
PDW BLD-RTO: 17.8 % (ref 11.5–14.5)
PLATELET # BLD: 142 K/UL (ref 130–400)
POTASSIUM SERPL-SCNC: 4 MEQ/L (ref 3.5–5.1)
PROTHROMBIN TIME: 10.8 SEC (ref 9.6–12.3)
RBC # BLD: 4.58 M/UL (ref 4.2–5.4)
SODIUM BLD-SCNC: 142 MEQ/L (ref 132–144)
TOTAL PROTEIN: 6.4 G/DL (ref 6.4–8.1)
WBC # BLD: 5.3 K/UL (ref 4.8–10.8)

## 2018-07-03 PROCEDURE — 96375 TX/PRO/DX INJ NEW DRUG ADDON: CPT

## 2018-07-03 PROCEDURE — C9113 INJ PANTOPRAZOLE SODIUM, VIA: HCPCS | Performed by: EMERGENCY MEDICINE

## 2018-07-03 PROCEDURE — 85025 COMPLETE CBC W/AUTO DIFF WBC: CPT

## 2018-07-03 PROCEDURE — 6360000002 HC RX W HCPCS: Performed by: EMERGENCY MEDICINE

## 2018-07-03 PROCEDURE — 86850 RBC ANTIBODY SCREEN: CPT

## 2018-07-03 PROCEDURE — 85610 PROTHROMBIN TIME: CPT

## 2018-07-03 PROCEDURE — 80053 COMPREHEN METABOLIC PANEL: CPT

## 2018-07-03 PROCEDURE — 99284 EMERGENCY DEPT VISIT MOD MDM: CPT

## 2018-07-03 PROCEDURE — 86900 BLOOD TYPING SEROLOGIC ABO: CPT

## 2018-07-03 PROCEDURE — 86901 BLOOD TYPING SEROLOGIC RH(D): CPT

## 2018-07-03 PROCEDURE — 71045 X-RAY EXAM CHEST 1 VIEW: CPT

## 2018-07-03 PROCEDURE — 96374 THER/PROPH/DIAG INJ IV PUSH: CPT

## 2018-07-03 PROCEDURE — 82271 OCCULT BLOOD OTHER SOURCES: CPT

## 2018-07-03 PROCEDURE — 36415 COLL VENOUS BLD VENIPUNCTURE: CPT

## 2018-07-03 RX ORDER — 0.9 % SODIUM CHLORIDE 0.9 %
10 VIAL (ML) INJECTION DAILY
Status: DISCONTINUED | OUTPATIENT
Start: 2018-07-03 | End: 2018-07-04 | Stop reason: HOSPADM

## 2018-07-03 RX ORDER — ONDANSETRON 2 MG/ML
4 INJECTION INTRAMUSCULAR; INTRAVENOUS ONCE
Status: COMPLETED | OUTPATIENT
Start: 2018-07-03 | End: 2018-07-03

## 2018-07-03 RX ORDER — PANTOPRAZOLE SODIUM 40 MG/10ML
40 INJECTION, POWDER, LYOPHILIZED, FOR SOLUTION INTRAVENOUS ONCE
Status: COMPLETED | OUTPATIENT
Start: 2018-07-03 | End: 2018-07-03

## 2018-07-03 RX ADMIN — PANTOPRAZOLE SODIUM 40 MG: 40 INJECTION, POWDER, FOR SOLUTION INTRAVENOUS at 20:04

## 2018-07-03 RX ADMIN — ONDANSETRON 4 MG: 2 INJECTION INTRAMUSCULAR; INTRAVENOUS at 20:04

## 2018-07-03 ASSESSMENT — ENCOUNTER SYMPTOMS
NAUSEA: 0
ABDOMINAL PAIN: 0
SORE THROAT: 0
EYE DISCHARGE: 0
VOMITING: 1
PHOTOPHOBIA: 0
SHORTNESS OF BREATH: 0
CHEST TIGHTNESS: 0
WHEEZING: 0
COUGH: 0
ABDOMINAL DISTENTION: 0

## 2018-07-03 NOTE — ED NOTES
Bed: 05  Expected date: 7/3/18  Expected time: 6:44 PM  Means of arrival: [facility]  Comments:  75 F - coffee ground emesis.  140/80,86,97% KARUNA Girard, RN  07/03/18 2174

## 2018-07-03 NOTE — ED PROVIDER NOTES
[facility] ED  eMERGENCY dEPARTMENT eNCOUnter      Pt Name: Jose Manning  MRN: [id number]  [place] 1945  Date of evaluation: 7/3/2018  Provider: Ki Dickerson MD    CHIEF COMPLAINT       Chief Complaint   Patient presents with    Emesis     coffee ground emesis at 6 pm today         HISTORY OF PRESENT ILLNESS   (Location/Symptom, Timing/Onset, Context/Setting, Quality, Duration, Modifying Factors, Severity)  Note limiting factors. Jose Manning is a 75 y.o. female who presents to the emergency department Complaining of coffee-ground emesis. Patient had one episode of coffee-ground emesis this evening. She has a history of upper GI bleed. Patient had history of gastric ulcer that was repaired by EGD one month ago. She's been back to the nursing home for approximately 1 week and doing well. She slept more than usual today and then had a cough coffee ground emesis this evening. No other complaints. The patient denies abdominal pain currently. She has no fever or chills. HPI    Nursing Notes were reviewed. REVIEW OF SYSTEMS    (2-9 systems for level 4, 10 or more for level 5)     Review of Systems   Constitutional: Negative for chills and diaphoresis. HENT: Negative for congestion, ear pain, mouth sores and sore throat. Eyes: Negative for photophobia and discharge. Respiratory: Negative for cough, chest tightness, shortness of breath and wheezing. Cardiovascular: Negative for chest pain and palpitations. Gastrointestinal: Positive for vomiting. Negative for abdominal distention, abdominal pain and nausea. Endocrine: Negative for cold intolerance. Genitourinary: Negative for difficulty urinating. Musculoskeletal: Negative for arthralgias. Skin: Negative for pallor and rash. Allergic/Immunologic: Negative for immunocompromised state. Neurological: Negative for dizziness and syncope. Hematological: Negative for adenopathy.    Psychiatric/Behavioral: Negative for agitation and hallucinations. All other systems reviewed and are negative. Except as noted above the remainder of the review of systems was reviewed and negative. PAST MEDICAL HISTORY     Past Medical History:   Diagnosis Date    Alzheimer disease     Diabetes mellitus ([address]     GERD (gastroesophageal reflux disease)     Hypertension     Osteoarthritis     Retinopathy          SURGICAL HISTORY       Past Surgical History:   Procedure Laterality Date    EYE SURGERY      HYSTERECTOMY      OR EGD TRANSORAL BIOPSY SINGLE/MULTIPLE N/A 1/21/2017    EGD BIOPSY performed by Marquita Miller MD at [facility] EGD TRANSORAL BIOPSY SINGLE/MULTIPLE N/A 6/7/2018    EGD performed by Marquita Miller MD at [facility]       Previous Medications    ACETAMINOPHEN (TYLENOL) 325 MG TABLET    Take 650 mg by mouth every 4 hours as needed    CALCIUM CARBONATE 600 MG TABS TABLET    Take 1 tablet by mouth daily    DEXTROSE 5 % SOLUTION    Infuse 100 mL/hr intravenously as needed (Low blood sugar)    DIVALPROEX (DEPAKOTE SPRINKLE) 125 MG CAPSULE    Take 125 mg by mouth daily    FAMOTIDINE (PEPCID) 20 MG TABLET    Take 20 mg by mouth nightly    FERROUS SULFATE 325 (65 FE) MG TABLET    Take 325 mg by mouth daily (with breakfast)    HYDRALAZINE (APRESOLINE) 20 MG/ML INJECTION    Infuse 0.5 mLs intravenously every 6 hours as needed (sbp>170)    HYPROMELLOSE 0.4 % SOLN OPHTHALMIC SOLUTION    Place 1 drop into both eyes 2 times daily    MAGNESIUM HYDROXIDE (MILK OF MAGNESIA) 400 MG/5ML SUSPENSION    Take 30 mLs by mouth every 4 hours as needed    MEROPENEM (MERREM) INFUSION    Infuse 1,000 mg intravenously every 8 hours Compound per protocol. PANTOPRAZOLE (PROTONIX) 40 MG TABLET    Take 1 tablet by mouth daily    SUCRALFATE (CARAFATE) 1 GM/10ML SUSPENSION    Take 1 g by mouth 3 times daily as needed       ALLERGIES     Patient has no known allergies.     FAMILY HISTORY     No family IMPRESSION      1.  Hematemesis without nausea          DISPOSITION/PLAN   DISPOSITION Decision To Discharge 07/03/2018 09:29:41 PM      PATIENT REFERRED TO:  Lizzeth Kendall MD  [address], [address] [place] [address]  [phone]    In 3 days        DISCHARGE MEDICATIONS:  New Prescriptions    No medications on file          (Please note that portions of this note were completed with a voice recognition program.  Efforts were made to edit the dictations but occasionally words are mis-transcribed.)    Lauren Rico MD (electronically signed)  Attending Emergency Physician          Lauren Rico MD  07/03/18 2129       Lauren Rico MD  07/03/18 213

## 2018-07-04 LAB — OCCULT BLOOD, OTHER: POSITIVE

## 2018-07-04 NOTE — ED NOTES
Lab reports blood work drawn per EMS was hemolyzed, tech to come redraw       Philippe Barker RN  07/03/18 2032

## 2018-07-22 ENCOUNTER — APPOINTMENT (OUTPATIENT)
Dept: CT IMAGING | Age: 73
DRG: 720 | End: 2018-07-22
Payer: MEDICAID

## 2018-07-22 ENCOUNTER — APPOINTMENT (OUTPATIENT)
Dept: GENERAL RADIOLOGY | Age: 73
DRG: 720 | End: 2018-07-22
Payer: MEDICAID

## 2018-07-22 ENCOUNTER — HOSPITAL ENCOUNTER (INPATIENT)
Age: 73
LOS: 4 days | Discharge: NURSING, REHAB OR LONG-TERM CARE | DRG: 720 | End: 2018-07-26
Attending: EMERGENCY MEDICINE | Admitting: OTOLARYNGOLOGY
Payer: MEDICAID

## 2018-07-22 DIAGNOSIS — N30.00 ACUTE CYSTITIS WITHOUT HEMATURIA: ICD-10-CM

## 2018-07-22 DIAGNOSIS — E86.0 DEHYDRATION: ICD-10-CM

## 2018-07-22 DIAGNOSIS — N17.9 ACUTE KIDNEY INJURY (HCC): ICD-10-CM

## 2018-07-22 DIAGNOSIS — D72.829 LEUKOCYTOSIS, UNSPECIFIED TYPE: ICD-10-CM

## 2018-07-22 DIAGNOSIS — R11.2 NON-INTRACTABLE VOMITING WITH NAUSEA, UNSPECIFIED VOMITING TYPE: Primary | ICD-10-CM

## 2018-07-22 DIAGNOSIS — K44.9 HIATAL HERNIA: ICD-10-CM

## 2018-07-22 DIAGNOSIS — R10.9 ABDOMINAL PAIN, UNSPECIFIED ABDOMINAL LOCATION: ICD-10-CM

## 2018-07-22 LAB
ALBUMIN SERPL-MCNC: 3.1 G/DL (ref 3.9–4.9)
ALP BLD-CCNC: 119 U/L (ref 40–130)
ALT SERPL-CCNC: 9 U/L (ref 0–33)
ANION GAP SERPL CALCULATED.3IONS-SCNC: 14 MEQ/L (ref 7–13)
ANISOCYTOSIS: ABNORMAL
AST SERPL-CCNC: 14 U/L (ref 0–35)
ATYPICAL LYMPHOCYTE RELATIVE PERCENT: 1 %
BACTERIA: ABNORMAL /HPF
BANDED NEUTROPHILS RELATIVE PERCENT: 1 % (ref 5–11)
BASOPHILS ABSOLUTE: 0 K/UL (ref 0–0.2)
BASOPHILS RELATIVE PERCENT: 0.3 %
BILIRUB SERPL-MCNC: 0.6 MG/DL (ref 0–1.2)
BILIRUBIN URINE: NEGATIVE
BLOOD, URINE: ABNORMAL
BUN BLDV-MCNC: 22 MG/DL (ref 8–23)
CALCIUM SERPL-MCNC: 9.9 MG/DL (ref 8.6–10.2)
CHLORIDE BLD-SCNC: 108 MEQ/L (ref 98–107)
CLARITY: ABNORMAL
CO2: 26 MEQ/L (ref 22–29)
COLOR: YELLOW
CREAT SERPL-MCNC: 1.33 MG/DL (ref 0.5–0.9)
CRYSTALS, UA: ABNORMAL
EKG ATRIAL RATE: 95 BPM
EKG P AXIS: 56 DEGREES
EKG P-R INTERVAL: 162 MS
EKG Q-T INTERVAL: 360 MS
EKG QRS DURATION: 88 MS
EKG QTC CALCULATION (BAZETT): 452 MS
EKG R AXIS: 9 DEGREES
EKG T AXIS: 89 DEGREES
EKG VENTRICULAR RATE: 95 BPM
EOSINOPHILS ABSOLUTE: 0 K/UL (ref 0–0.7)
EOSINOPHILS RELATIVE PERCENT: 0.2 %
EPITHELIAL CELLS, UA: ABNORMAL /HPF
GFR AFRICAN AMERICAN: 47.1
GFR NON-AFRICAN AMERICAN: 39
GLOBULIN: 4.5 G/DL (ref 2.3–3.5)
GLUCOSE BLD-MCNC: 124 MG/DL (ref 74–109)
GLUCOSE URINE: NEGATIVE MG/DL
HCT VFR BLD CALC: 41.8 % (ref 37–47)
HEMOGLOBIN: 13.5 G/DL (ref 12–16)
HYPOCHROMIA: ABNORMAL
KETONES, URINE: NEGATIVE MG/DL
LACTIC ACID, SEPSIS: 1.9 MMOL/L (ref 0.5–1.9)
LEUKOCYTE ESTERASE, URINE: ABNORMAL
LIPASE: 10 U/L (ref 13–60)
LYMPHOCYTES ABSOLUTE: 1.2 K/UL (ref 1–4.8)
LYMPHOCYTES RELATIVE PERCENT: 3 %
MACROCYTES: 0
MCH RBC QN AUTO: 26.5 PG (ref 27–31.3)
MCHC RBC AUTO-ENTMCNC: 32.4 % (ref 33–37)
MCV RBC AUTO: 82 FL (ref 82–100)
MICROCYTES: ABNORMAL
MONOCYTES ABSOLUTE: 0.3 K/UL (ref 0.2–0.8)
MONOCYTES RELATIVE PERCENT: 0.9 %
NEUTROPHILS ABSOLUTE: 29 K/UL (ref 1.4–6.5)
NEUTROPHILS RELATIVE PERCENT: 95 %
NITRITE, URINE: NEGATIVE
PDW BLD-RTO: 19.1 % (ref 11.5–14.5)
PH UA: 6.5 (ref 5–9)
PLATELET # BLD: 187 K/UL (ref 130–400)
PLATELET SLIDE REVIEW: ADEQUATE
POIKILOCYTES: 0
POLYCHROMASIA: 0
POTASSIUM SERPL-SCNC: 5 MEQ/L (ref 3.5–5.1)
PROTEIN UA: ABNORMAL MG/DL
RBC # BLD: 5.11 M/UL (ref 4.2–5.4)
RBC UA: ABNORMAL /HPF (ref 0–2)
SODIUM BLD-SCNC: 148 MEQ/L (ref 132–144)
SPECIFIC GRAVITY UA: 1.02 (ref 1–1.03)
TOTAL PROTEIN: 7.6 G/DL (ref 6.4–8.1)
TROPONIN: 0.02 NG/ML (ref 0–0.01)
TROPONIN: 0.03 NG/ML (ref 0–0.01)
URINE REFLEX TO CULTURE: YES
UROBILINOGEN, URINE: 0.2 E.U./DL
WBC # BLD: 30.2 K/UL (ref 4.8–10.8)
WBC UA: ABNORMAL /HPF (ref 0–5)

## 2018-07-22 PROCEDURE — 2580000003 HC RX 258: Performed by: EMERGENCY MEDICINE

## 2018-07-22 PROCEDURE — C9113 INJ PANTOPRAZOLE SODIUM, VIA: HCPCS | Performed by: INTERNAL MEDICINE

## 2018-07-22 PROCEDURE — 83605 ASSAY OF LACTIC ACID: CPT

## 2018-07-22 PROCEDURE — 36415 COLL VENOUS BLD VENIPUNCTURE: CPT

## 2018-07-22 PROCEDURE — 99285 EMERGENCY DEPT VISIT HI MDM: CPT

## 2018-07-22 PROCEDURE — 6360000002 HC RX W HCPCS: Performed by: EMERGENCY MEDICINE

## 2018-07-22 PROCEDURE — 87040 BLOOD CULTURE FOR BACTERIA: CPT

## 2018-07-22 PROCEDURE — 1210000000 HC MED SURG R&B

## 2018-07-22 PROCEDURE — 74176 CT ABD & PELVIS W/O CONTRAST: CPT

## 2018-07-22 PROCEDURE — 83690 ASSAY OF LIPASE: CPT

## 2018-07-22 PROCEDURE — G8982 BODY POS GOAL STATUS: HCPCS

## 2018-07-22 PROCEDURE — 96374 THER/PROPH/DIAG INJ IV PUSH: CPT

## 2018-07-22 PROCEDURE — 93005 ELECTROCARDIOGRAM TRACING: CPT

## 2018-07-22 PROCEDURE — 96375 TX/PRO/DX INJ NEW DRUG ADDON: CPT

## 2018-07-22 PROCEDURE — 87186 SC STD MICRODIL/AGAR DIL: CPT

## 2018-07-22 PROCEDURE — 81001 URINALYSIS AUTO W/SCOPE: CPT

## 2018-07-22 PROCEDURE — 87077 CULTURE AEROBIC IDENTIFY: CPT

## 2018-07-22 PROCEDURE — 6370000000 HC RX 637 (ALT 250 FOR IP): Performed by: INTERNAL MEDICINE

## 2018-07-22 PROCEDURE — G8981 BODY POS CURRENT STATUS: HCPCS

## 2018-07-22 PROCEDURE — 6360000002 HC RX W HCPCS: Performed by: INTERNAL MEDICINE

## 2018-07-22 PROCEDURE — C9113 INJ PANTOPRAZOLE SODIUM, VIA: HCPCS | Performed by: EMERGENCY MEDICINE

## 2018-07-22 PROCEDURE — G8983 BODY POS D/C STATUS: HCPCS

## 2018-07-22 PROCEDURE — 85025 COMPLETE CBC W/AUTO DIFF WBC: CPT

## 2018-07-22 PROCEDURE — 80053 COMPREHEN METABOLIC PANEL: CPT

## 2018-07-22 PROCEDURE — 87086 URINE CULTURE/COLONY COUNT: CPT

## 2018-07-22 PROCEDURE — 71045 X-RAY EXAM CHEST 1 VIEW: CPT

## 2018-07-22 PROCEDURE — 84484 ASSAY OF TROPONIN QUANT: CPT

## 2018-07-22 PROCEDURE — 82274 ASSAY TEST FOR BLOOD FECAL: CPT

## 2018-07-22 PROCEDURE — 97161 PT EVAL LOW COMPLEX 20 MIN: CPT

## 2018-07-22 PROCEDURE — 2580000003 HC RX 258: Performed by: INTERNAL MEDICINE

## 2018-07-22 RX ORDER — CALCIUM CARBONATE 500(1250)
500 TABLET ORAL DAILY
Status: DISCONTINUED | OUTPATIENT
Start: 2018-07-22 | End: 2018-07-26 | Stop reason: HOSPADM

## 2018-07-22 RX ORDER — ACETAMINOPHEN 325 MG/1
650 TABLET ORAL EVERY 4 HOURS PRN
Status: DISCONTINUED | OUTPATIENT
Start: 2018-07-22 | End: 2018-07-26 | Stop reason: HOSPADM

## 2018-07-22 RX ORDER — 0.9 % SODIUM CHLORIDE 0.9 %
30 INTRAVENOUS SOLUTION INTRAVENOUS ONCE
Status: COMPLETED | OUTPATIENT
Start: 2018-07-22 | End: 2018-07-22

## 2018-07-22 RX ORDER — PANTOPRAZOLE SODIUM 40 MG/1
40 TABLET, DELAYED RELEASE ORAL DAILY
Status: DISCONTINUED | OUTPATIENT
Start: 2018-07-22 | End: 2018-07-22

## 2018-07-22 RX ORDER — SODIUM CHLORIDE 0.9 % (FLUSH) 0.9 %
10 SYRINGE (ML) INJECTION PRN
Status: DISCONTINUED | OUTPATIENT
Start: 2018-07-22 | End: 2018-07-26 | Stop reason: HOSPADM

## 2018-07-22 RX ORDER — ONDANSETRON 2 MG/ML
4 INJECTION INTRAMUSCULAR; INTRAVENOUS EVERY 6 HOURS PRN
Status: DISCONTINUED | OUTPATIENT
Start: 2018-07-22 | End: 2018-07-26 | Stop reason: HOSPADM

## 2018-07-22 RX ORDER — FERROUS SULFATE 325(65) MG
325 TABLET ORAL
Status: DISCONTINUED | OUTPATIENT
Start: 2018-07-23 | End: 2018-07-26 | Stop reason: HOSPADM

## 2018-07-22 RX ORDER — 0.9 % SODIUM CHLORIDE 0.9 %
10 VIAL (ML) INJECTION 2 TIMES DAILY
Status: DISCONTINUED | OUTPATIENT
Start: 2018-07-22 | End: 2018-07-26 | Stop reason: HOSPADM

## 2018-07-22 RX ORDER — ONDANSETRON 2 MG/ML
4 INJECTION INTRAMUSCULAR; INTRAVENOUS ONCE
Status: COMPLETED | OUTPATIENT
Start: 2018-07-22 | End: 2018-07-22

## 2018-07-22 RX ORDER — DIVALPROEX SODIUM 125 MG/1
125 CAPSULE, COATED PELLETS ORAL DAILY
Status: DISCONTINUED | OUTPATIENT
Start: 2018-07-22 | End: 2018-07-26 | Stop reason: HOSPADM

## 2018-07-22 RX ORDER — PANTOPRAZOLE SODIUM 40 MG/10ML
40 INJECTION, POWDER, LYOPHILIZED, FOR SOLUTION INTRAVENOUS 2 TIMES DAILY
Status: DISCONTINUED | OUTPATIENT
Start: 2018-07-22 | End: 2018-07-26 | Stop reason: HOSPADM

## 2018-07-22 RX ORDER — SUCRALFATE 1 G/1
1 TABLET ORAL EVERY 6 HOURS SCHEDULED
Status: DISCONTINUED | OUTPATIENT
Start: 2018-07-22 | End: 2018-07-26 | Stop reason: HOSPADM

## 2018-07-22 RX ORDER — SODIUM CHLORIDE 0.9 % (FLUSH) 0.9 %
10 SYRINGE (ML) INJECTION EVERY 12 HOURS SCHEDULED
Status: DISCONTINUED | OUTPATIENT
Start: 2018-07-22 | End: 2018-07-26 | Stop reason: HOSPADM

## 2018-07-22 RX ORDER — SODIUM CHLORIDE 450 MG/100ML
INJECTION, SOLUTION INTRAVENOUS CONTINUOUS
Status: DISCONTINUED | OUTPATIENT
Start: 2018-07-22 | End: 2018-07-25

## 2018-07-22 RX ADMIN — PANTOPRAZOLE SODIUM 40 MG: 40 INJECTION, POWDER, FOR SOLUTION INTRAVENOUS at 14:56

## 2018-07-22 RX ADMIN — ONDANSETRON 4 MG: 2 INJECTION INTRAMUSCULAR; INTRAVENOUS at 18:53

## 2018-07-22 RX ADMIN — MAGNESIUM HYDROXIDE 30 ML: 400 SUSPENSION ORAL at 20:24

## 2018-07-22 RX ADMIN — Medication 10 ML: at 20:24

## 2018-07-22 RX ADMIN — CALCIUM 500 MG: 500 TABLET ORAL at 17:52

## 2018-07-22 RX ADMIN — Medication 10 ML: at 14:59

## 2018-07-22 RX ADMIN — Medication 10 ML: at 21:34

## 2018-07-22 RX ADMIN — SUCRALFATE 1 G: 1 TABLET ORAL at 17:52

## 2018-07-22 RX ADMIN — SODIUM CHLORIDE: 4.5 INJECTION, SOLUTION INTRAVENOUS at 14:56

## 2018-07-22 RX ADMIN — HYPROMELLOSE 2910 1 DROP: 5 SOLUTION OPHTHALMIC at 17:51

## 2018-07-22 RX ADMIN — HYPROMELLOSE 2910 1 DROP: 5 SOLUTION OPHTHALMIC at 21:34

## 2018-07-22 RX ADMIN — PANTOPRAZOLE SODIUM 40 MG: 40 INJECTION, POWDER, FOR SOLUTION INTRAVENOUS at 10:29

## 2018-07-22 RX ADMIN — PIPERACILLIN SODIUM AND TAZOBACTAM SODIUM 3.38 G: 3; .375 INJECTION, POWDER, LYOPHILIZED, FOR SOLUTION INTRAVENOUS at 09:07

## 2018-07-22 RX ADMIN — PIPERACILLIN SODIUM AND TAZOBACTAM SODIUM 3.38 G: 3; .375 INJECTION, POWDER, LYOPHILIZED, FOR SOLUTION INTRAVENOUS at 17:51

## 2018-07-22 RX ADMIN — DIVALPROEX SODIUM 125 MG: 125 CAPSULE, COATED PELLETS ORAL at 17:52

## 2018-07-22 RX ADMIN — ONDANSETRON HYDROCHLORIDE 4 MG: 2 INJECTION, SOLUTION INTRAMUSCULAR; INTRAVENOUS at 10:03

## 2018-07-22 RX ADMIN — PANTOPRAZOLE SODIUM 40 MG: 40 INJECTION, POWDER, FOR SOLUTION INTRAVENOUS at 20:24

## 2018-07-22 RX ADMIN — Medication 10 ML: at 14:57

## 2018-07-22 RX ADMIN — SODIUM CHLORIDE 2763 ML: 9 INJECTION, SOLUTION INTRAVENOUS at 08:39

## 2018-07-22 NOTE — ED NOTES
0.027 troponin [facility]family     Madonna Willard RN  07/22/18 1286

## 2018-07-22 NOTE — H&P
Hospital Problems    Diagnosis Date Noted    MALA (acute kidney injury) ([facility] [N17.9] 07/22/2018    Sepsis ([facility] [A41.9] 05/09/2018    Nausea & vomiting [R11.2] 01/20/2017    GERD (gastroesophageal reflux disease) [K21.9] 04/21/2015    Alzheimer disease [G30.9] 04/21/2015     Nausea and vomiting: patient was admitted with this complaint. She has history of GI bleed due to hiatal hernia and gastric ulcer. It is not clear if her vomitus was clear vs had hematemesis. Will start on IV Protonix , carafate and observe. IF has hemetemesis will have GI evaluation. Occult stool    Continue IVF hydration with 0.45 saline due to mild hypernatremia. BMP in AM    Positive UA : patient has chronic alfaro catheter. Have requested nursing to change Alfaro and sent new urine for cultures. Continue Zosyn till then    MALA: due to nausea, vomiting and poor oral intake. Continue IVF. BMP in AM    Elevated troponin: most likely from MALA in presence of normal EKG. Will trend troponin    Daughter mentions that patient has intermittent bouts of alertness and confusion and changes in appetite for past 2 months now. While this can occur in setting of sepsis, this is most likely course of underlying dementia.  Will have speech and swallow eval. If at risk of malnutrition due to poor oral intake, may need PEG tube placement    DVT prophylaxis:  SCD    Prince Iniguez MD  Pager : [phone]

## 2018-07-22 NOTE — ED NOTES
Pt more active-moving arms-removing oxygen sat monitor. .reassured. aNdya Dominguez, FREDA  07/22/18 4358

## 2018-07-22 NOTE — PROGRESS NOTES
Physical Therapy Med Surg Initial Assessment  Facility/Department: [facility]  Room: [place] [id number]       NAME: Destin Salazar  : 1945 (75 y.o.)  MRN: [id number]  CODE STATUS: Full Code    Date of Service: 2018    Patient Diagnosis(es): Sepsis [facility]) [A41.9]   Chief Complaint   Patient presents with    Emesis     per  [facility] patient started vomiting this am patient was seen here couple weeks ago for same thing      Patient Active Problem List    Diagnosis Date Noted    MALA (acute kidney injury) ([address] 2018    Coffee ground emesis 2018    Aspiration pneumonia of left lower lobe due to vomit ([address]     Encephalopathy     Hypothermia     E. 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, not stated as uncontrolled(250.50) 2010    Pseudophakia 2010    Proliferative diabetic retinopathy ([address] 2010    GIST, malignant ([address] 10/05/2009        Past Medical History:   Diagnosis Date    Alzheimer disease     Diabetes mellitus ([address]     GERD (gastroesophageal reflux disease)     Hypertension     Osteoarthritis     Retinopathy      Past Surgical History:   Procedure Laterality Date    EYE SURGERY      HYSTERECTOMY      NM EGD TRANSORAL BIOPSY SINGLE/MULTIPLE N/A 2017    EGD BIOPSY performed by Wyatt Stafford MD at [facility] EGD TRANSORAL BIOPSY

## 2018-07-22 NOTE — ED TRIAGE NOTES
Patient sent over from [facility] due to vomiting that started this am.  Patient was here couple weeks ago for the same thing.   Patient alert x2

## 2018-07-22 NOTE — ED PROVIDER NOTES
[address] [facility] ED  eMERGENCY dEPARTMENT eNCOUnter      Pt Name: Liss Nova  MRN: [id number]  [place] 1945  Date of evaluation: 7/22/2018  Provider: Lola Brambila, [facility]       Chief Complaint   Patient presents with    Emesis     per [facility] patient started vomiting this am patient was seen here couple weeks ago for same thing          HISTORY OF PRESENT ILLNESS   (Location/Symptom, Timing/Onset, Context/Setting, Quality, Duration, Modifying Factors, Severity)  Note limiting factors. Liss Nova is a 75 y.o. female who presents to the emergency department . She was sent here from the nursing home because she was vomiting this morning. Apparently that occurred 3 weeks ago and she had some coffee ground emesis. She was sent home on Protonix. Patient has dementia and cannot answer any questions     Daughter came in and states that patient has not eaten anything for couple of days and was complaining of abdominal pain yesterday. HPI    Nursing Notes were reviewed. REVIEW OF SYSTEMS    (2-9 systems for level 4, 10 or more for level 5)     Review of Systems   Unable to perform ROS: Dementia   Endocrine: Positive for cold intolerance. Except as noted above the remainder of the review of systems was reviewed and negative.        PAST MEDICAL HISTORY     Past Medical History:   Diagnosis Date    Alzheimer disease     Diabetes mellitus ([address]     GERD (gastroesophageal reflux disease)     Hypertension     Osteoarthritis     Retinopathy          SURGICAL HISTORY       Past Surgical History:   Procedure Laterality Date    EYE SURGERY      HYSTERECTOMY      OR EGD TRANSORAL BIOPSY SINGLE/MULTIPLE N/A 1/21/2017    EGD BIOPSY performed by Shannan Stoner MD at [facility] EGD TRANSORAL BIOPSY SINGLE/MULTIPLE N/A 6/7/2018    EGD performed by Shannan Stoner MD at [address] [facility]       Previous Medications    ACETAMINOPHEN ([address]) She appears well-developed and well-nourished. No distress. HENT:   Head: Normocephalic and atraumatic. Right Ear: External ear normal.   Left Ear: External ear normal.   Mouth/Throat: Oropharynx is clear and moist. No oropharyngeal exudate. Eyes: Conjunctivae are normal. Pupils are equal, round, and reactive to light. Neck: Normal range of motion. Neck supple. No JVD present. No tracheal deviation present. No thyromegaly present. Cardiovascular: Normal rate and normal heart sounds. No murmur heard. Pulmonary/Chest: Effort normal and breath sounds normal. No respiratory distress. She has no wheezes. Abdominal: Soft. Bowel sounds are normal. She exhibits no distension. There is no tenderness. There is no guarding. Musculoskeletal: Normal range of motion. She exhibits no edema. Neurological: No cranial nerve deficit. Lethargic   Skin: Skin is warm and dry. No rash noted. She is not diaphoretic. DIAGNOSTIC RESULTS     EKG: All EKG's are interpreted by the Emergency Department Physician who either signs or Co-signs this chart in the absence of a cardiologist.    Normal sinus rhythm 95 bpm.  No ischemia no ectopy    RADIOLOGY:   Non-plain film images such as CT, Ultrasound and MRI are read by the radiologist. Plain radiographic images are visualized and preliminarily interpreted by the emergency physician with the below findings:    CT of the abdomen and pelvis shows hiatal hernia but no other acute pathology  Chest x-ray shows no acute pathology    Interpretation per the Radiologist below, if available at the time of this note:    XR CHEST PORTABLE    (Results Pending)         ED BEDSIDE ULTRASOUND:   Performed by ED Physician - none    LABS:  Labs Reviewed   CBC WITH AUTO DIFFERENTIAL   COMPREHENSIVE METABOLIC PANEL   LIPASE   URINE RT REFLEX TO CULTURE   TROPONIN       All other labs were within normal range or not returned as of this dictation.     EMERGENCY DEPARTMENT COURSE and

## 2018-07-23 LAB
ANION GAP SERPL CALCULATED.3IONS-SCNC: 9 MEQ/L (ref 7–13)
BASOPHILS ABSOLUTE: 0 K/UL (ref 0–0.2)
BASOPHILS RELATIVE PERCENT: 0.2 %
BUN BLDV-MCNC: 26 MG/DL (ref 8–23)
CALCIUM SERPL-MCNC: 8.6 MG/DL (ref 8.6–10.2)
CHLORIDE BLD-SCNC: 114 MEQ/L (ref 98–107)
CO2: 23 MEQ/L (ref 22–29)
CREAT SERPL-MCNC: 1.51 MG/DL (ref 0.5–0.9)
EOSINOPHILS ABSOLUTE: 0.1 K/UL (ref 0–0.7)
EOSINOPHILS RELATIVE PERCENT: 0.5 %
GFR AFRICAN AMERICAN: 40.7
GFR NON-AFRICAN AMERICAN: 33.6
GLUCOSE BLD-MCNC: 95 MG/DL (ref 74–109)
HCT VFR BLD CALC: 29.5 % (ref 37–47)
HCT VFR BLD CALC: 31.6 % (ref 37–47)
HEMOCCULT STL QL: NORMAL
HEMOGLOBIN: 10.3 G/DL (ref 12–16)
HEMOGLOBIN: 9.5 G/DL (ref 12–16)
LYMPHOCYTES ABSOLUTE: 1.4 K/UL (ref 1–4.8)
LYMPHOCYTES RELATIVE PERCENT: 5.8 %
MCH RBC QN AUTO: 26.7 PG (ref 27–31.3)
MCHC RBC AUTO-ENTMCNC: 32.4 % (ref 33–37)
MCV RBC AUTO: 82.3 FL (ref 82–100)
MONOCYTES ABSOLUTE: 0.6 K/UL (ref 0.2–0.8)
MONOCYTES RELATIVE PERCENT: 2.6 %
NEUTROPHILS ABSOLUTE: 21.5 K/UL (ref 1.4–6.5)
NEUTROPHILS RELATIVE PERCENT: 90.9 %
PDW BLD-RTO: 19.2 % (ref 11.5–14.5)
PLATELET # BLD: 123 K/UL (ref 130–400)
POTASSIUM REFLEX MAGNESIUM: 4.2 MEQ/L (ref 3.5–5.1)
RBC # BLD: 3.84 M/UL (ref 4.2–5.4)
SODIUM BLD-SCNC: 146 MEQ/L (ref 132–144)
WBC # BLD: 23.7 K/UL (ref 4.8–10.8)

## 2018-07-23 PROCEDURE — 85014 HEMATOCRIT: CPT

## 2018-07-23 PROCEDURE — G8996 SWALLOW CURRENT STATUS: HCPCS

## 2018-07-23 PROCEDURE — 85018 HEMOGLOBIN: CPT

## 2018-07-23 PROCEDURE — 2580000003 HC RX 258: Performed by: EMERGENCY MEDICINE

## 2018-07-23 PROCEDURE — 2700000000 HC OXYGEN THERAPY PER DAY

## 2018-07-23 PROCEDURE — 6360000002 HC RX W HCPCS: Performed by: INTERNAL MEDICINE

## 2018-07-23 PROCEDURE — 6370000000 HC RX 637 (ALT 250 FOR IP): Performed by: INTERNAL MEDICINE

## 2018-07-23 PROCEDURE — 92610 EVALUATE SWALLOWING FUNCTION: CPT

## 2018-07-23 PROCEDURE — 36415 COLL VENOUS BLD VENIPUNCTURE: CPT

## 2018-07-23 PROCEDURE — 80048 BASIC METABOLIC PNL TOTAL CA: CPT

## 2018-07-23 PROCEDURE — G8997 SWALLOW GOAL STATUS: HCPCS

## 2018-07-23 PROCEDURE — 85025 COMPLETE CBC W/AUTO DIFF WBC: CPT

## 2018-07-23 PROCEDURE — 2580000003 HC RX 258: Performed by: INTERNAL MEDICINE

## 2018-07-23 PROCEDURE — 1210000000 HC MED SURG R&B

## 2018-07-23 PROCEDURE — C9113 INJ PANTOPRAZOLE SODIUM, VIA: HCPCS | Performed by: INTERNAL MEDICINE

## 2018-07-23 RX ADMIN — SUCRALFATE 1 G: 1 TABLET ORAL at 05:54

## 2018-07-23 RX ADMIN — DIVALPROEX SODIUM 125 MG: 125 CAPSULE, COATED PELLETS ORAL at 09:53

## 2018-07-23 RX ADMIN — PIPERACILLIN SODIUM AND TAZOBACTAM SODIUM 3.38 G: 3; .375 INJECTION, POWDER, LYOPHILIZED, FOR SOLUTION INTRAVENOUS at 09:50

## 2018-07-23 RX ADMIN — SODIUM CHLORIDE: 4.5 INJECTION, SOLUTION INTRAVENOUS at 02:02

## 2018-07-23 RX ADMIN — PANTOPRAZOLE SODIUM 40 MG: 40 INJECTION, POWDER, FOR SOLUTION INTRAVENOUS at 20:58

## 2018-07-23 RX ADMIN — SUCRALFATE 1 G: 1 TABLET ORAL at 00:23

## 2018-07-23 RX ADMIN — Medication 10 ML: at 09:00

## 2018-07-23 RX ADMIN — SUCRALFATE 1 G: 1 TABLET ORAL at 18:00

## 2018-07-23 RX ADMIN — PANTOPRAZOLE SODIUM 40 MG: 40 INJECTION, POWDER, FOR SOLUTION INTRAVENOUS at 09:54

## 2018-07-23 RX ADMIN — PIPERACILLIN SODIUM AND TAZOBACTAM SODIUM 3.38 G: 3; .375 INJECTION, POWDER, LYOPHILIZED, FOR SOLUTION INTRAVENOUS at 01:56

## 2018-07-23 RX ADMIN — CALCIUM 500 MG: 500 TABLET ORAL at 09:53

## 2018-07-23 RX ADMIN — FERROUS SULFATE TAB 325 MG (65 MG ELEMENTAL FE) 325 MG: 325 (65 FE) TAB at 09:54

## 2018-07-23 RX ADMIN — HYPROMELLOSE 2910 1 DROP: 5 SOLUTION OPHTHALMIC at 09:30

## 2018-07-23 RX ADMIN — HYPROMELLOSE 2910 1 DROP: 5 SOLUTION OPHTHALMIC at 20:58

## 2018-07-23 RX ADMIN — Medication 10 ML: at 09:30

## 2018-07-23 RX ADMIN — SUCRALFATE 1 G: 1 TABLET ORAL at 11:41

## 2018-07-23 RX ADMIN — PIPERACILLIN SODIUM AND TAZOBACTAM SODIUM 3.38 G: 3; .375 INJECTION, POWDER, LYOPHILIZED, FOR SOLUTION INTRAVENOUS at 17:55

## 2018-07-23 RX ADMIN — Medication 10 ML: at 09:52

## 2018-07-23 NOTE — PROGRESS NOTES
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 coli sepsis ([address] Acute lower UTI; Stupor; Encephalopathy; Hypothermia; Aspiration pneumonia of left lower lobe due to vomit ([address] Coffee ground emesis; Type II or unspecified type diabetes mellitus without mention of complication, not stated as uncontrolled; Pseudophakia; Proliferative diabetic retinopathy ([address] and MALA (acute kidney injury) ([address] on her problem list.    ONSET DATE: 2018    Date of Eval: 2018  Evaluating Therapist: Leopold Larsen. Dylan HERRERA CCC-SLP    Recommended Diet and Intervention  Diet Solids Recommendation: Dysphagia I Pureed  Liquid Consistency Recommendation: Nectar  Recommended Form of Meds: Crushed in puree as able  Therapeutic Interventions: Diet tolerance monitoring, Patient/Family education    Compensatory Swallowing Strategies  Compensatory Swallowing Strategies: Upright as possible for all oral intake;Eat/Feed slowly; Small bites/sips; No straws          Reason for Referral  Susan Bruno was referred for a bedside swallow evaluation to assess the efficiency of her swallow function, identify signs and symptoms of aspiration and make recommendations regarding safe dietary consistencies, effective compensatory strategies, and safe eating environment. General  Chart Reviewed: Yes  Behavior/Cognition: Uncooperative;Agitated;Confused; Distractible;Requires cueing  Respiratory Status: O2 via nasual cannula  O2 Device:

## 2018-07-24 LAB
ANION GAP SERPL CALCULATED.3IONS-SCNC: 10 MEQ/L (ref 7–13)
BUN BLDV-MCNC: 18 MG/DL (ref 8–23)
CALCIUM SERPL-MCNC: 8.1 MG/DL (ref 8.6–10.2)
CHLORIDE BLD-SCNC: 111 MEQ/L (ref 98–107)
CO2: 24 MEQ/L (ref 22–29)
CREAT SERPL-MCNC: 1.35 MG/DL (ref 0.5–0.9)
GFR AFRICAN AMERICAN: 46.3
GFR NON-AFRICAN AMERICAN: 38.3
GLUCOSE BLD-MCNC: 76 MG/DL (ref 74–109)
HCT VFR BLD CALC: 27.5 % (ref 37–47)
HEMOGLOBIN: 9.1 G/DL (ref 12–16)
MCH RBC QN AUTO: 27 PG (ref 27–31.3)
MCHC RBC AUTO-ENTMCNC: 33.1 % (ref 33–37)
MCV RBC AUTO: 81.5 FL (ref 82–100)
PDW BLD-RTO: 18.9 % (ref 11.5–14.5)
PLATELET # BLD: 136 K/UL (ref 130–400)
POTASSIUM SERPL-SCNC: 4.3 MEQ/L (ref 3.5–5.1)
RBC # BLD: 3.37 M/UL (ref 4.2–5.4)
SODIUM BLD-SCNC: 145 MEQ/L (ref 132–144)
URINE CULTURE, ROUTINE: NORMAL
WBC # BLD: 8.7 K/UL (ref 4.8–10.8)

## 2018-07-24 PROCEDURE — 2700000000 HC OXYGEN THERAPY PER DAY

## 2018-07-24 PROCEDURE — 1210000000 HC MED SURG R&B

## 2018-07-24 PROCEDURE — 6360000002 HC RX W HCPCS: Performed by: INTERNAL MEDICINE

## 2018-07-24 PROCEDURE — 80048 BASIC METABOLIC PNL TOTAL CA: CPT

## 2018-07-24 PROCEDURE — 6360000002 HC RX W HCPCS

## 2018-07-24 PROCEDURE — C9113 INJ PANTOPRAZOLE SODIUM, VIA: HCPCS | Performed by: INTERNAL MEDICINE

## 2018-07-24 PROCEDURE — 36415 COLL VENOUS BLD VENIPUNCTURE: CPT

## 2018-07-24 PROCEDURE — 85027 COMPLETE CBC AUTOMATED: CPT

## 2018-07-24 PROCEDURE — 2580000003 HC RX 258: Performed by: INTERNAL MEDICINE

## 2018-07-24 PROCEDURE — 6370000000 HC RX 637 (ALT 250 FOR IP): Performed by: INTERNAL MEDICINE

## 2018-07-24 RX ADMIN — PIPERACILLIN SODIUM AND TAZOBACTAM SODIUM 3.38 G: 3; .375 INJECTION, POWDER, LYOPHILIZED, FOR SOLUTION INTRAVENOUS at 10:01

## 2018-07-24 RX ADMIN — SUCRALFATE 1 G: 1 TABLET ORAL at 00:30

## 2018-07-24 RX ADMIN — HYPROMELLOSE 2910 1 DROP: 5 SOLUTION OPHTHALMIC at 10:05

## 2018-07-24 RX ADMIN — CALCIUM 500 MG: 500 TABLET ORAL at 13:40

## 2018-07-24 RX ADMIN — HYPROMELLOSE 2910 1 DROP: 5 SOLUTION OPHTHALMIC at 20:38

## 2018-07-24 RX ADMIN — SODIUM CHLORIDE: 4.5 INJECTION, SOLUTION INTRAVENOUS at 06:16

## 2018-07-24 RX ADMIN — PIPERACILLIN SODIUM AND TAZOBACTAM SODIUM 3.38 G: 3; .375 INJECTION, POWDER, LYOPHILIZED, FOR SOLUTION INTRAVENOUS at 02:21

## 2018-07-24 RX ADMIN — DIVALPROEX SODIUM 125 MG: 125 CAPSULE, COATED PELLETS ORAL at 13:40

## 2018-07-24 RX ADMIN — SODIUM CHLORIDE: 4.5 INJECTION, SOLUTION INTRAVENOUS at 16:20

## 2018-07-24 RX ADMIN — PIPERACILLIN SODIUM AND TAZOBACTAM SODIUM 3.38 G: 3; .375 INJECTION, POWDER, LYOPHILIZED, FOR SOLUTION INTRAVENOUS at 18:16

## 2018-07-24 RX ADMIN — FERROUS SULFATE TAB 325 MG (65 MG ELEMENTAL FE) 325 MG: 325 (65 FE) TAB at 13:40

## 2018-07-24 RX ADMIN — PANTOPRAZOLE SODIUM 40 MG: 40 INJECTION, POWDER, FOR SOLUTION INTRAVENOUS at 20:38

## 2018-07-24 RX ADMIN — Medication 10 ML: at 10:04

## 2018-07-24 RX ADMIN — PANTOPRAZOLE SODIUM 40 MG: 40 INJECTION, POWDER, FOR SOLUTION INTRAVENOUS at 10:05

## 2018-07-24 RX ADMIN — SUCRALFATE 1 G: 1 TABLET ORAL at 13:40

## 2018-07-24 NOTE — CARE COORDINATION
Pt is long term from [facility]. She will need a LOC to return because she is medicaid only and out of bed hold days. Michael Cali Electronically signed by VERENA Gardner on 7/24/2018 at 2:22 PM

## 2018-07-24 NOTE — PROGRESS NOTES
Hospitalist Progress Note      PCP: Noble Becerra MD    Date of Admission: 7/22/2018    Chief Complaint:  N/V  Subjective: :   Pt continues to be lethargic. She was more awake earlier this morning per her nurse and ate some breakfast. Pt withdraws to stimuli on all extremities. Pupils are reactive to light. No signs of distress. Medications:      Infusion Medications    sodium chloride 100 mL/hr at 07/24/18 0616     Scheduled Medications    sodium chloride flush  10 mL Intravenous 2 times per day    calcium elemental  500 mg Oral Daily    divalproex  125 mg Oral Daily    ferrous sulfate  325 mg Oral Daily with breakfast    hypromellose  1 drop Both Eyes BID    sucralfate  1 g Oral 4 times per day    sodium chloride flush  10 mL Intravenous 2 times per day    piperacillin-tazobactam  3.375 g Intravenous Q8H    pantoprazole  40 mg Intravenous BID    And    sodium chloride (PF)  10 mL Intravenous BID     PRN Meds: sodium chloride flush, sodium chloride flush, magnesium hydroxide, ondansetron, acetaminophen      Intake/Output Summary (Last 24 hours) at 07/24/18 1557  Last data filed at 07/24/18 0522   Gross per 24 hour   Intake             1935 ml   Output              850 ml   Net             1085 ml       Exam:    BP (!) 103/42   Pulse 78   Temp 99 °F (37.2 °C) (Axillary)   Resp 24   Ht 5' 1\" (1.549 m)   Wt 184 lb 11.9 oz (83.8 kg)   SpO2 96%   BMI 34.91 kg/m²     CONSTITUTIONAL:lehthargic, no apparent distress, withdraws to stimuli in all extremities. NECK:  Supple, no masses. LUNGS:  CTAB no distress  CARDIOVASCULAR:  S1S2 present, no murmurs  ABDOMEN:  soft, non-distended, non-tender  MUSCULOSKELETAL:  No edema or calf tenderness. NEUROLOGIC:  altered mental status, lethargic, withdraws to painful stimu in all extremities. symmetrical facial expressions. EXTREMITIES: Warm and well perfused.  , no calf tenderness, no edema    Labs:   Recent Labs      07/22/18   0715  07/23/18   9561 07/23/18   1821  07/24/18   1356   WBC  30.2*  23.7*   --   8.7   HGB  13.5  10.3*  9.5*  9.1*   HCT  41.8  31.6*  29.5*  27.5*   PLT  187  123*   --   136     Recent Labs      07/22/18   0730  07/23/18   0620  07/24/18   1356   NA  148*  146*  145*   K  5.0  4.2  4.3   CL  108*  114*  111*   CO2  26 23 24   BUN  22 26* 18   CREATININE  1.33*  1.51*  1.35*   CALCIUM  9.9  8.6  8.1*     Recent Labs      07/22/18   0730   AST  14   ALT  9   BILITOT  0.6   ALKPHOS  119     No results for input(s): INR in the last 72 hours. Recent Labs      07/22/18   0730  07/22/18   1524   TROPONINI  0.027*  0.018*       Radiology:  XR CHEST PORTABLE   Final Result         1. No acute intrathoracic process or significant interval change. 2. Hiatal hernia. CT ABDOMEN PELVIS WO CONTRAST Additional Contrast? None   Final Result   1. LARGE HIATAL HERNIA CONTAINING AN AIR/FLUID LEVEL SIMILAR TO PRIOR CT ABDOMEN. 2. UNREMARKABLE NONDILATED SMALL BOWEL AND NO EVIDENCE OF SMALL BOWEL OBSTRUCTION. 3. NORMAL APPENDIX AND COLONIC DIVERTICULOSIS WITHOUT DIVERTICULITIS. 4. NONDISTENDED GALLBLADDER CONTAINING THICK \"BILE SLUDGE\" OR SMALL GALLSTONES. 5. STABLE 2 SMALL HYPODENSE LESIONS IN UPPER POLE RIGHT KIDNEY ARE TOO SMALL FOR FURTHER CHARACTERIZATION. 6. PRIOR HYSTERECTOMY AND DECOMPRESSED URINARY BLADDER WITH BENAVIDES CATHETER IN PLACE. All CT scans at this facility use dose modulation, iterative reconstruction, and/or weight based dosing when appropriate to reduce radiation dose to as low as reasonably achievable. Assessment/Plan:    Active Hospital Problems    Diagnosis Date Noted    MALA (acute kidney injury) ([facility] [N17.9] 07/22/2018    Sepsis ([facility] [A41.9] 05/09/2018    Nausea & vomiting [R11.2] 01/20/2017    GERD (gastroesophageal reflux disease) [K21.9] 04/21/2015    Alzheimer disease [G30.9] 04/21/2015     1.  Dehydration and hypernatremia, slowly improving, urine less concentrated, Continue IV

## 2018-07-24 NOTE — PROGRESS NOTES
Pt turned and repositioned q 2 hours. meds given per orders. Alert to self. Fall precautions in place. Hourly rounds continue.

## 2018-07-25 LAB
AMMONIA: 24 UMOL/L (ref 11–51)
ANION GAP SERPL CALCULATED.3IONS-SCNC: 9 MEQ/L (ref 7–13)
BASOPHILS ABSOLUTE: 0 K/UL (ref 0–0.2)
BASOPHILS RELATIVE PERCENT: 0.5 %
BUN BLDV-MCNC: 16 MG/DL (ref 8–23)
CALCIUM SERPL-MCNC: 8.4 MG/DL (ref 8.6–10.2)
CHLORIDE BLD-SCNC: 111 MEQ/L (ref 98–107)
CO2: 25 MEQ/L (ref 22–29)
CREAT SERPL-MCNC: 1.25 MG/DL (ref 0.5–0.9)
EOSINOPHILS ABSOLUTE: 0.1 K/UL (ref 0–0.7)
EOSINOPHILS RELATIVE PERCENT: 2.1 %
FOLATE: 6 NG/ML (ref 7.3–26.1)
GFR AFRICAN AMERICAN: 50.6
GFR NON-AFRICAN AMERICAN: 41.8
GLUCOSE BLD-MCNC: 69 MG/DL (ref 74–109)
HCT VFR BLD CALC: 26.1 % (ref 37–47)
HEMOGLOBIN: 8.7 G/DL (ref 12–16)
LYMPHOCYTES ABSOLUTE: 1.2 K/UL (ref 1–4.8)
LYMPHOCYTES RELATIVE PERCENT: 18.2 %
MCH RBC QN AUTO: 27.1 PG (ref 27–31.3)
MCHC RBC AUTO-ENTMCNC: 33.3 % (ref 33–37)
MCV RBC AUTO: 81.5 FL (ref 82–100)
MONOCYTES ABSOLUTE: 0.4 K/UL (ref 0.2–0.8)
MONOCYTES RELATIVE PERCENT: 6 %
NEUTROPHILS ABSOLUTE: 4.8 K/UL (ref 1.4–6.5)
NEUTROPHILS RELATIVE PERCENT: 73.2 %
ORGANISM: ABNORMAL
ORGANISM: ABNORMAL
PDW BLD-RTO: 18.7 % (ref 11.5–14.5)
PLATELET # BLD: 129 K/UL (ref 130–400)
POTASSIUM SERPL-SCNC: 3.6 MEQ/L (ref 3.5–5.1)
RBC # BLD: 3.2 M/UL (ref 4.2–5.4)
SODIUM BLD-SCNC: 145 MEQ/L (ref 132–144)
TSH SERPL DL<=0.05 MIU/L-ACNC: 2.57 UIU/ML (ref 0.27–4.2)
URINE CULTURE, ROUTINE: ABNORMAL
VITAMIN B-12: 1215 PG/ML (ref 232–1245)
WBC # BLD: 6.5 K/UL (ref 4.8–10.8)

## 2018-07-25 PROCEDURE — 6370000000 HC RX 637 (ALT 250 FOR IP): Performed by: INTERNAL MEDICINE

## 2018-07-25 PROCEDURE — 80048 BASIC METABOLIC PNL TOTAL CA: CPT

## 2018-07-25 PROCEDURE — 82607 VITAMIN B-12: CPT

## 2018-07-25 PROCEDURE — 82746 ASSAY OF FOLIC ACID SERUM: CPT

## 2018-07-25 PROCEDURE — 2580000003 HC RX 258: Performed by: INTERNAL MEDICINE

## 2018-07-25 PROCEDURE — 82140 ASSAY OF AMMONIA: CPT

## 2018-07-25 PROCEDURE — 84443 ASSAY THYROID STIM HORMONE: CPT

## 2018-07-25 PROCEDURE — 85025 COMPLETE CBC W/AUTO DIFF WBC: CPT

## 2018-07-25 PROCEDURE — 6360000002 HC RX W HCPCS: Performed by: INTERNAL MEDICINE

## 2018-07-25 PROCEDURE — 2580000003 HC RX 258: Performed by: EMERGENCY MEDICINE

## 2018-07-25 PROCEDURE — 36415 COLL VENOUS BLD VENIPUNCTURE: CPT

## 2018-07-25 PROCEDURE — C9113 INJ PANTOPRAZOLE SODIUM, VIA: HCPCS | Performed by: INTERNAL MEDICINE

## 2018-07-25 PROCEDURE — 1210000000 HC MED SURG R&B

## 2018-07-25 PROCEDURE — 2700000000 HC OXYGEN THERAPY PER DAY

## 2018-07-25 RX ORDER — MEMANTINE HYDROCHLORIDE 5 MG/1
5 TABLET ORAL DAILY
Status: DISCONTINUED | OUTPATIENT
Start: 2018-07-25 | End: 2018-07-26 | Stop reason: HOSPADM

## 2018-07-25 RX ORDER — DEXTROSE MONOHYDRATE 50 MG/ML
INJECTION, SOLUTION INTRAVENOUS CONTINUOUS
Status: DISCONTINUED | OUTPATIENT
Start: 2018-07-25 | End: 2018-07-26 | Stop reason: HOSPADM

## 2018-07-25 RX ADMIN — PANTOPRAZOLE SODIUM 40 MG: 40 INJECTION, POWDER, FOR SOLUTION INTRAVENOUS at 09:30

## 2018-07-25 RX ADMIN — Medication 10 ML: at 22:05

## 2018-07-25 RX ADMIN — DEXTROSE MONOHYDRATE: 50 INJECTION, SOLUTION INTRAVENOUS at 21:36

## 2018-07-25 RX ADMIN — CALCIUM 500 MG: 500 TABLET ORAL at 09:30

## 2018-07-25 RX ADMIN — SUCRALFATE 1 G: 1 TABLET ORAL at 18:12

## 2018-07-25 RX ADMIN — Medication 10 ML: at 21:37

## 2018-07-25 RX ADMIN — MEMANTINE HYDROCHLORIDE 5 MG: 5 TABLET ORAL at 18:11

## 2018-07-25 RX ADMIN — PIPERACILLIN SODIUM AND TAZOBACTAM SODIUM 3.38 G: 3; .375 INJECTION, POWDER, LYOPHILIZED, FOR SOLUTION INTRAVENOUS at 10:27

## 2018-07-25 RX ADMIN — FERROUS SULFATE TAB 325 MG (65 MG ELEMENTAL FE) 325 MG: 325 (65 FE) TAB at 09:30

## 2018-07-25 RX ADMIN — DEXTROSE MONOHYDRATE: 50 INJECTION, SOLUTION INTRAVENOUS at 16:49

## 2018-07-25 RX ADMIN — DIVALPROEX SODIUM 125 MG: 125 CAPSULE, COATED PELLETS ORAL at 09:30

## 2018-07-25 RX ADMIN — PIPERACILLIN SODIUM AND TAZOBACTAM SODIUM 3.38 G: 3; .375 INJECTION, POWDER, LYOPHILIZED, FOR SOLUTION INTRAVENOUS at 02:42

## 2018-07-25 RX ADMIN — HYPROMELLOSE 2910 1 DROP: 5 SOLUTION OPHTHALMIC at 21:37

## 2018-07-25 RX ADMIN — PANTOPRAZOLE SODIUM 40 MG: 40 INJECTION, POWDER, FOR SOLUTION INTRAVENOUS at 21:36

## 2018-07-25 RX ADMIN — PIPERACILLIN SODIUM AND TAZOBACTAM SODIUM 3.38 G: 3; .375 INJECTION, POWDER, LYOPHILIZED, FOR SOLUTION INTRAVENOUS at 18:10

## 2018-07-25 RX ADMIN — SODIUM CHLORIDE: 4.5 INJECTION, SOLUTION INTRAVENOUS at 01:02

## 2018-07-25 RX ADMIN — HYPROMELLOSE 2910 1 DROP: 5 SOLUTION OPHTHALMIC at 09:36

## 2018-07-25 NOTE — PROGRESS NOTES
Recurrent N,V with Hx of gastric ulcer and hiatal hernia, On Carafate and PPI. Tolerating PO intake. 4. MALA, continues to improve, on IV d5w.  5. Elevated troponins, not ACS, no acute ischemic changes on EKG most likely decreased renal clearance of troponin  6. Anemia, slow drop with rehydration, no evidence of bleeding, FOBT negative  7. Metabolic encephalopathy from dehydration and advanced dementia, Continue IV fluids, PT/OT, check TSH, ammonia, B12 and folate      Diet: DIET GENERAL; Dysphagia I Pureed;  Nectar Thick  Dietary Nutrition Supplements: Frozen Oral Supplement    Code Status: Full Code            Electronically signed by Shellie Carver MD on 7/25/2018 at 3:31 PM

## 2018-07-25 NOTE — CARE COORDINATION
PLAN REMAINS DC TO [facility] WHEN MEDICALLY STABLE, WILL NEED MD SIGNATURE ON KAYY BEFORE LEVEL OF CARE CAN BE STARTED, NOTIFIED DR. Regis Ho .  WILL FOLLOW

## 2018-07-26 VITALS
DIASTOLIC BLOOD PRESSURE: 43 MMHG | RESPIRATION RATE: 20 BRPM | WEIGHT: 184.75 LBS | HEART RATE: 58 BPM | TEMPERATURE: 97.3 F | HEIGHT: 61 IN | OXYGEN SATURATION: 100 % | SYSTOLIC BLOOD PRESSURE: 121 MMHG | BODY MASS INDEX: 34.88 KG/M2

## 2018-07-26 PROBLEM — N39.0 UTI (URINARY TRACT INFECTION): Status: ACTIVE | Noted: 2018-07-26

## 2018-07-26 LAB
ANION GAP SERPL CALCULATED.3IONS-SCNC: 8 MEQ/L (ref 7–13)
BASOPHILS ABSOLUTE: 0 K/UL (ref 0–0.2)
BASOPHILS RELATIVE PERCENT: 0.3 %
BUN BLDV-MCNC: 11 MG/DL (ref 8–23)
CALCIUM SERPL-MCNC: 8.5 MG/DL (ref 8.6–10.2)
CHLORIDE BLD-SCNC: 106 MEQ/L (ref 98–107)
CO2: 27 MEQ/L (ref 22–29)
CREAT SERPL-MCNC: 0.91 MG/DL (ref 0.5–0.9)
EOSINOPHILS ABSOLUTE: 0.2 K/UL (ref 0–0.7)
EOSINOPHILS RELATIVE PERCENT: 3 %
GFR AFRICAN AMERICAN: >60
GFR NON-AFRICAN AMERICAN: >60
GLUCOSE BLD-MCNC: 99 MG/DL (ref 74–109)
HCT VFR BLD CALC: 26.8 % (ref 37–47)
HEMOGLOBIN: 8.8 G/DL (ref 12–16)
LYMPHOCYTES ABSOLUTE: 1.2 K/UL (ref 1–4.8)
LYMPHOCYTES RELATIVE PERCENT: 22.3 %
MCH RBC QN AUTO: 26.6 PG (ref 27–31.3)
MCHC RBC AUTO-ENTMCNC: 32.8 % (ref 33–37)
MCV RBC AUTO: 81.1 FL (ref 82–100)
MONOCYTES ABSOLUTE: 0.4 K/UL (ref 0.2–0.8)
MONOCYTES RELATIVE PERCENT: 8.2 %
NEUTROPHILS ABSOLUTE: 3.6 K/UL (ref 1.4–6.5)
NEUTROPHILS RELATIVE PERCENT: 66.2 %
PDW BLD-RTO: 18.4 % (ref 11.5–14.5)
PLATELET # BLD: 145 K/UL (ref 130–400)
POTASSIUM SERPL-SCNC: 3.4 MEQ/L (ref 3.5–5.1)
RBC # BLD: 3.3 M/UL (ref 4.2–5.4)
SODIUM BLD-SCNC: 141 MEQ/L (ref 132–144)
WBC # BLD: 5.5 K/UL (ref 4.8–10.8)

## 2018-07-26 PROCEDURE — 2580000003 HC RX 258: Performed by: INTERNAL MEDICINE

## 2018-07-26 PROCEDURE — 2700000000 HC OXYGEN THERAPY PER DAY

## 2018-07-26 PROCEDURE — 6360000002 HC RX W HCPCS: Performed by: INTERNAL MEDICINE

## 2018-07-26 PROCEDURE — 6370000000 HC RX 637 (ALT 250 FOR IP): Performed by: INTERNAL MEDICINE

## 2018-07-26 PROCEDURE — C9113 INJ PANTOPRAZOLE SODIUM, VIA: HCPCS | Performed by: INTERNAL MEDICINE

## 2018-07-26 PROCEDURE — 36415 COLL VENOUS BLD VENIPUNCTURE: CPT

## 2018-07-26 PROCEDURE — 85025 COMPLETE CBC W/AUTO DIFF WBC: CPT

## 2018-07-26 PROCEDURE — 80048 BASIC METABOLIC PNL TOTAL CA: CPT

## 2018-07-26 RX ORDER — AMOXICILLIN AND CLAVULANATE POTASSIUM 875; 125 MG/1; MG/1
1 TABLET, FILM COATED ORAL 2 TIMES DAILY
Qty: 8 TABLET | Refills: 0
Start: 2018-07-26 | End: 2018-07-30

## 2018-07-26 RX ORDER — FOLIC ACID 1 MG/1
1 TABLET ORAL DAILY
Status: DISCONTINUED | OUTPATIENT
Start: 2018-07-26 | End: 2018-07-26 | Stop reason: HOSPADM

## 2018-07-26 RX ORDER — MEMANTINE HYDROCHLORIDE 5 MG/1
5 TABLET ORAL DAILY
Qty: 60 TABLET | Refills: 3
Start: 2018-07-27 | End: 2020-08-07

## 2018-07-26 RX ORDER — FOLIC ACID 1 MG/1
1 TABLET ORAL DAILY
Qty: 30 TABLET | Refills: 3
Start: 2018-07-27 | End: 2020-08-07

## 2018-07-26 RX ADMIN — Medication 10 ML: at 08:24

## 2018-07-26 RX ADMIN — SUCRALFATE 1 G: 1 TABLET ORAL at 08:31

## 2018-07-26 RX ADMIN — HYPROMELLOSE 2910 1 DROP: 5 SOLUTION OPHTHALMIC at 08:34

## 2018-07-26 RX ADMIN — DIVALPROEX SODIUM 125 MG: 125 CAPSULE, COATED PELLETS ORAL at 08:26

## 2018-07-26 RX ADMIN — PIPERACILLIN SODIUM AND TAZOBACTAM SODIUM 3.38 G: 3; .375 INJECTION, POWDER, LYOPHILIZED, FOR SOLUTION INTRAVENOUS at 02:18

## 2018-07-26 RX ADMIN — CALCIUM 500 MG: 500 TABLET ORAL at 08:26

## 2018-07-26 RX ADMIN — MEMANTINE HYDROCHLORIDE 5 MG: 5 TABLET ORAL at 08:26

## 2018-07-26 RX ADMIN — DEXTROSE MONOHYDRATE: 50 INJECTION, SOLUTION INTRAVENOUS at 12:20

## 2018-07-26 RX ADMIN — PIPERACILLIN SODIUM AND TAZOBACTAM SODIUM 3.38 G: 3; .375 INJECTION, POWDER, LYOPHILIZED, FOR SOLUTION INTRAVENOUS at 09:58

## 2018-07-26 RX ADMIN — FERROUS SULFATE TAB 325 MG (65 MG ELEMENTAL FE) 325 MG: 325 (65 FE) TAB at 08:26

## 2018-07-26 RX ADMIN — PANTOPRAZOLE SODIUM 40 MG: 40 INJECTION, POWDER, FOR SOLUTION INTRAVENOUS at 08:24

## 2018-07-26 NOTE — PROGRESS NOTES
Pt assessment complete. Pt resting in bed. Answers questions inappropriately. Pt oriented to self. Declined pills, and pudding or applesauce. Pt alfaro secured with device, and not leaking.      Electronically signed by Hal Rinaldi RN on 7/26/2018 at 6:14 AM

## 2018-07-26 NOTE — CARE COORDINATION
Pt from [facility] and plan is to return. Received LOC back and faxed to [facility]. Phone call to daughter Jayant Flores who confirmed plan.     Transport scheduled for 5 pm.

## 2018-07-27 LAB
BLOOD CULTURE, ROUTINE: NORMAL
CULTURE, BLOOD 2: NORMAL

## 2018-08-25 PROBLEM — N39.0 UTI (URINARY TRACT INFECTION): Status: RESOLVED | Noted: 2018-07-26 | Resolved: 2018-08-25

## 2018-08-30 ENCOUNTER — HOSPITAL ENCOUNTER (EMERGENCY)
Age: 73
Discharge: HOME | DRG: 466 | End: 2018-08-30
Attending: EMERGENCY MEDICINE
Payer: MEDICAID

## 2018-08-30 ENCOUNTER — APPOINTMENT (OUTPATIENT)
Dept: GENERAL RADIOLOGY | Age: 73
DRG: 466 | End: 2018-08-30
Payer: MEDICAID

## 2018-08-30 VITALS
DIASTOLIC BLOOD PRESSURE: 50 MMHG | HEART RATE: 48 BPM | RESPIRATION RATE: 17 BRPM | TEMPERATURE: 97.6 F | SYSTOLIC BLOOD PRESSURE: 126 MMHG | WEIGHT: 169.38 LBS | OXYGEN SATURATION: 100 % | BODY MASS INDEX: 32 KG/M2

## 2018-08-30 DIAGNOSIS — N30.01 ACUTE CYSTITIS WITH HEMATURIA: Primary | ICD-10-CM

## 2018-08-30 LAB
ALBUMIN SERPL-MCNC: 2.6 G/DL (ref 3.9–4.9)
ALP BLD-CCNC: 132 U/L (ref 40–130)
ALT SERPL-CCNC: 23 U/L (ref 0–33)
ANION GAP SERPL CALCULATED.3IONS-SCNC: 12 MEQ/L (ref 7–13)
AST SERPL-CCNC: 36 U/L (ref 0–35)
BACTERIA: ABNORMAL /HPF
BILIRUB SERPL-MCNC: 0.3 MG/DL (ref 0–1.2)
BILIRUBIN URINE: NEGATIVE
BLOOD, URINE: ABNORMAL
BUN BLDV-MCNC: 18 MG/DL (ref 8–23)
CALCIUM SERPL-MCNC: 9.3 MG/DL (ref 8.6–10.2)
CHLORIDE BLD-SCNC: 109 MEQ/L (ref 98–107)
CHP ED QC CHECK: NORMAL
CLARITY: ABNORMAL
CO2: 20 MEQ/L (ref 22–29)
COLOR: YELLOW
CREAT SERPL-MCNC: 1.01 MG/DL (ref 0.5–0.9)
CRYSTALS, UA: ABNORMAL
EKG ATRIAL RATE: 49 BPM
EKG P AXIS: 39 DEGREES
EKG P-R INTERVAL: 196 MS
EKG Q-T INTERVAL: 474 MS
EKG QRS DURATION: 108 MS
EKG QTC CALCULATION (BAZETT): 428 MS
EKG R AXIS: 6 DEGREES
EKG T AXIS: 36 DEGREES
EKG VENTRICULAR RATE: 49 BPM
EPITHELIAL CELLS, UA: ABNORMAL /HPF
GFR AFRICAN AMERICAN: >60
GFR NON-AFRICAN AMERICAN: 53.5
GLOBULIN: 4.1 G/DL (ref 2.3–3.5)
GLUCOSE BLD-MCNC: 60 MG/DL (ref 74–109)
GLUCOSE BLD-MCNC: 70 MG/DL
GLUCOSE BLD-MCNC: 70 MG/DL (ref 60–115)
GLUCOSE URINE: NEGATIVE MG/DL
HCT VFR BLD CALC: 37.2 % (ref 37–47)
HEMOGLOBIN: 12.2 G/DL (ref 12–16)
KETONES, URINE: NEGATIVE MG/DL
LEUKOCYTE ESTERASE, URINE: ABNORMAL
MCH RBC QN AUTO: 26 PG (ref 27–31.3)
MCHC RBC AUTO-ENTMCNC: 32.8 % (ref 33–37)
MCV RBC AUTO: 79 FL (ref 82–100)
NITRITE, URINE: POSITIVE
PDW BLD-RTO: 22 % (ref 11.5–14.5)
PERFORMED ON: NORMAL
PH UA: 6.5 (ref 5–9)
PLATELET # BLD: 42 K/UL (ref 130–400)
PLATELET # BLD: ABNORMAL K/UL (ref 130–400)
PLATELET SLIDE REVIEW: NORMAL
POTASSIUM SERPL-SCNC: 5.3 MEQ/L (ref 3.5–5.1)
POTASSIUM SERPL-SCNC: 5.4 MEQ/L (ref 3.5–5.1)
PROTEIN UA: NEGATIVE MG/DL
RBC # BLD: 4.71 M/UL (ref 4.2–5.4)
RBC UA: ABNORMAL /HPF (ref 0–2)
REJECTED TEST: NORMAL
SLIDE REVIEW: ABNORMAL
SODIUM BLD-SCNC: 141 MEQ/L (ref 132–144)
SPECIFIC GRAVITY UA: 1.01 (ref 1–1.03)
TOTAL PROTEIN: 6.7 G/DL (ref 6.4–8.1)
TROPONIN: 0.03 NG/ML (ref 0–0.01)
URINE REFLEX TO CULTURE: YES
UROBILINOGEN, URINE: 1 E.U./DL
WBC # BLD: 4.2 K/UL (ref 4.8–10.8)
WBC UA: ABNORMAL /HPF (ref 0–5)

## 2018-08-30 PROCEDURE — 87186 SC STD MICRODIL/AGAR DIL: CPT

## 2018-08-30 PROCEDURE — 85027 COMPLETE CBC AUTOMATED: CPT

## 2018-08-30 PROCEDURE — 36415 COLL VENOUS BLD VENIPUNCTURE: CPT

## 2018-08-30 PROCEDURE — 87086 URINE CULTURE/COLONY COUNT: CPT

## 2018-08-30 PROCEDURE — 80053 COMPREHEN METABOLIC PANEL: CPT

## 2018-08-30 PROCEDURE — 84132 ASSAY OF SERUM POTASSIUM: CPT

## 2018-08-30 PROCEDURE — 93005 ELECTROCARDIOGRAM TRACING: CPT

## 2018-08-30 PROCEDURE — 99285 EMERGENCY DEPT VISIT HI MDM: CPT

## 2018-08-30 PROCEDURE — 85049 AUTOMATED PLATELET COUNT: CPT

## 2018-08-30 PROCEDURE — 71045 X-RAY EXAM CHEST 1 VIEW: CPT

## 2018-08-30 PROCEDURE — 93010 ELECTROCARDIOGRAM REPORT: CPT | Performed by: INTERNAL MEDICINE

## 2018-08-30 PROCEDURE — 84484 ASSAY OF TROPONIN QUANT: CPT

## 2018-08-30 PROCEDURE — 81001 URINALYSIS AUTO W/SCOPE: CPT

## 2018-08-30 PROCEDURE — 6370000000 HC RX 637 (ALT 250 FOR IP): Performed by: EMERGENCY MEDICINE

## 2018-08-30 PROCEDURE — 87077 CULTURE AEROBIC IDENTIFY: CPT

## 2018-08-30 RX ORDER — CIPROFLOXACIN 500 MG/1
500 TABLET, FILM COATED ORAL ONCE
Status: COMPLETED | OUTPATIENT
Start: 2018-08-30 | End: 2018-08-30

## 2018-08-30 RX ORDER — CIPROFLOXACIN 500 MG/1
500 TABLET, FILM COATED ORAL 2 TIMES DAILY
Qty: 14 TABLET | Refills: 0 | Status: ON HOLD | OUTPATIENT
Start: 2018-08-30 | End: 2018-09-17 | Stop reason: HOSPADM

## 2018-08-30 RX ADMIN — CIPROFLOXACIN HYDROCHLORIDE 500 MG: 500 TABLET, FILM COATED ORAL at 13:39

## 2018-08-30 NOTE — ED PROVIDER NOTES
[address] [facility] ED  eMERGENCY dEPARTMENT eNCOUnter      Pt Name: Jose Manning  MRN: [id number]  [place] 1945  Date of evaluation: 8/30/2018  Provider: Vaughn Levin, [address] [place] [address]       Chief Complaint   Patient presents with    Altered Mental Status         HISTORY OF PRESENT ILLNESS   (Location/Symptom, Timing/Onset, Context/Setting, Quality, Duration, Modifying Factors, Severity)  Note limiting factors. Jose Manning is a 75 y.o. female who presents to the emergency department With a chief complaint of the staff at the [facility] noticing that she \"wasn't acting right\". Patient's daughter is familiar with this behavior and states that she has good days and bad days with her dementia. She feels that the home has to send her because she is full code and she can't explain herself. She is agreeable to \"checking her out a little\". The patient has had no vomiting, hematemesis, complaints of pain, or abnormalities in her behavior that have not been previously seen with just worsening of her dementia on that particular day. HPI    Nursing Notes were reviewed. REVIEW OF SYSTEMS    (2-9 systems for level 4, 10 or more for level 5)     Review of Systems   Unable to perform ROS: Dementia       Except as noted above the remainder of the review of systems was reviewed and negative.        PAST MEDICAL HISTORY     Past Medical History:   Diagnosis Date    Alzheimer disease     Diabetes mellitus ([address]     GERD (gastroesophageal reflux disease)     Hypertension     Osteoarthritis     Retinopathy          SURGICAL HISTORY       Past Surgical History:   Procedure Laterality Date    EYE SURGERY      HYSTERECTOMY      DE EGD TRANSORAL BIOPSY SINGLE/MULTIPLE N/A 1/21/2017    EGD BIOPSY performed by Nicole Kinney MD at [address] [facility] EGD TRANSORAL BIOPSY SINGLE/MULTIPLE N/A 6/7/2018    EGD performed by Nicole Kinney MD at [place] [address] Physical Exam   Constitutional: She appears well-developed and well-nourished. No distress. HENT:   Head: Normocephalic and atraumatic. Eyes: Pupils are equal, round, and reactive to light. Conjunctivae and EOM are normal.   Neck: Normal range of motion. Neck supple. Cardiovascular: Exam reveals no gallop and no friction rub. No murmur heard. Slightly mary   Pulmonary/Chest: Effort normal. She has no wheezes. She has no rales. Abdominal: Soft. Bowel sounds are normal. There is no tenderness. Musculoskeletal: Normal range of motion. She exhibits no edema, tenderness or deformity. Neurological: She is alert. She has normal reflexes. Pt nonverbal and not following commands   Skin: Skin is warm and dry. No rash noted. Psychiatric: She has a normal mood and affect.        DIAGNOSTIC RESULTS     EKG: All EKG's are interpreted by the Emergency Department Physician who either signs or Co-signs this chart in the absence of a cardiologist.    Sinus bradycardia at 49 bpm with no acute ST segment elevation or T-wave inversion    RADIOLOGY:   Non-plain film images such as CT, Ultrasound and MRI are read by the radiologist. Plain radiographic images are visualized and preliminarily interpreted by the emergency physician with the below findings:        Interpretation per the Radiologist below, if available at the time of this note:    XR CHEST PORTABLE   Final Result    [facility]            ED BEDSIDE ULTRASOUND:   Performed by ED Physician - none    LABS:  Labs Reviewed   COMPREHENSIVE METABOLIC PANEL - Abnormal; Notable for the following:        Result Value    Potassium 5.4 (*)     Chloride 109 (*)     CO2 20 (*)     Glucose 60 (*)     CREATININE 1.01 (*)     GFR Non- 53.5 (*)     Alb 2.6 (*)     Alkaline Phosphatase 132 (*)     AST 36 (*)     Globulin 4.1 (*)     All other components within normal limits   URINE RT REFLEX TO CULTURE - Abnormal; Notable for

## 2018-08-30 NOTE — ED TRIAGE NOTES
Pt brought by Nyasia Tucker from [address] [place] [address] for c/o altered mental status. Per daughter at the bedside this is how she has been behaving like this lately. They are currently looking into hospice or to do dialysis. Pt is alert and oriented x1. Daughter states her mother hasn't been talking the last couple of days. History of dementia. Pt appears in no distress upon arrival.  Resps even and unlabored. Abdomen round soft and non-tender to touch. Sánchez catheter in place prior to arrival.  Urine in tube appears somewhat cloudy in nature. No edema noted to lower extremities. Pt is very strong will not let me do any push pulls on her.

## 2018-08-31 ENCOUNTER — HOSPITAL ENCOUNTER (INPATIENT)
Age: 73
LOS: 17 days | Discharge: OTHER INSTITUTION | DRG: 466 | End: 2018-09-18
Attending: INTERNAL MEDICINE | Admitting: INTERNAL MEDICINE
Payer: MEDICAID

## 2018-08-31 ENCOUNTER — APPOINTMENT (OUTPATIENT)
Dept: GENERAL RADIOLOGY | Age: 73
DRG: 466 | End: 2018-08-31
Payer: MEDICAID

## 2018-08-31 DIAGNOSIS — J96.01 ACUTE RESPIRATORY FAILURE WITH HYPOXIA (HCC): ICD-10-CM

## 2018-08-31 DIAGNOSIS — N39.0 URINARY TRACT INFECTION ASSOCIATED WITH INDWELLING URETHRAL CATHETER, INITIAL ENCOUNTER (HCC): ICD-10-CM

## 2018-08-31 DIAGNOSIS — A41.9 SEPSIS, DUE TO UNSPECIFIED ORGANISM: ICD-10-CM

## 2018-08-31 DIAGNOSIS — G30.9 ALZHEIMER'S DEMENTIA WITHOUT BEHAVIORAL DISTURBANCE, UNSPECIFIED TIMING OF DEMENTIA ONSET: ICD-10-CM

## 2018-08-31 DIAGNOSIS — T68.XXXA HYPOTHERMIA, INITIAL ENCOUNTER: Primary | ICD-10-CM

## 2018-08-31 DIAGNOSIS — F02.80 ALZHEIMER'S DEMENTIA WITHOUT BEHAVIORAL DISTURBANCE, UNSPECIFIED TIMING OF DEMENTIA ONSET: ICD-10-CM

## 2018-08-31 DIAGNOSIS — R11.14 BILIOUS VOMITING, PRESENCE OF NAUSEA NOT SPECIFIED: ICD-10-CM

## 2018-08-31 DIAGNOSIS — A41.9 SEPTICEMIA (HCC): ICD-10-CM

## 2018-08-31 DIAGNOSIS — E87.0 HYPERNATREMIA: ICD-10-CM

## 2018-08-31 DIAGNOSIS — T83.511A URINARY TRACT INFECTION ASSOCIATED WITH INDWELLING URETHRAL CATHETER, INITIAL ENCOUNTER (HCC): ICD-10-CM

## 2018-08-31 DIAGNOSIS — R79.89 ELEVATED TSH: ICD-10-CM

## 2018-08-31 DIAGNOSIS — R41.0 DISORIENTATION: ICD-10-CM

## 2018-08-31 LAB
APTT: 38.3 SEC (ref 21.6–35.4)
BACTERIA: ABNORMAL /HPF
BILIRUBIN URINE: NEGATIVE
BLOOD, URINE: ABNORMAL
CLARITY: ABNORMAL
COLOR: YELLOW
EPITHELIAL CELLS, UA: ABNORMAL /HPF
GLUCOSE BLD-MCNC: 90 MG/DL (ref 60–115)
GLUCOSE URINE: NEGATIVE MG/DL
INR BLD: 1.1
KETONES, URINE: ABNORMAL MG/DL
LEUKOCYTE ESTERASE, URINE: ABNORMAL
NITRITE, URINE: NEGATIVE
PERFORMED ON: NORMAL
PH UA: 5 (ref 5–9)
PROTEIN UA: ABNORMAL MG/DL
PROTHROMBIN TIME: 11.1 SEC (ref 9.6–12.3)
RBC UA: ABNORMAL /HPF (ref 0–2)
RENAL EPITHELIAL, UA: ABNORMAL /HPF
SPECIFIC GRAVITY UA: 1.02 (ref 1–1.03)
URINE REFLEX TO CULTURE: YES
UROBILINOGEN, URINE: 0.2 E.U./DL
WBC UA: ABNORMAL /HPF (ref 0–5)

## 2018-08-31 PROCEDURE — 84484 ASSAY OF TROPONIN QUANT: CPT

## 2018-08-31 PROCEDURE — 83690 ASSAY OF LIPASE: CPT

## 2018-08-31 PROCEDURE — 93005 ELECTROCARDIOGRAM TRACING: CPT

## 2018-08-31 PROCEDURE — 87040 BLOOD CULTURE FOR BACTERIA: CPT

## 2018-08-31 PROCEDURE — 82550 ASSAY OF CK (CPK): CPT

## 2018-08-31 PROCEDURE — 87086 URINE CULTURE/COLONY COUNT: CPT

## 2018-08-31 PROCEDURE — 83605 ASSAY OF LACTIC ACID: CPT

## 2018-08-31 PROCEDURE — 84443 ASSAY THYROID STIM HORMONE: CPT

## 2018-08-31 PROCEDURE — 87077 CULTURE AEROBIC IDENTIFY: CPT

## 2018-08-31 PROCEDURE — 80053 COMPREHEN METABOLIC PANEL: CPT

## 2018-08-31 PROCEDURE — 87186 SC STD MICRODIL/AGAR DIL: CPT

## 2018-08-31 PROCEDURE — 71045 X-RAY EXAM CHEST 1 VIEW: CPT

## 2018-08-31 PROCEDURE — 36415 COLL VENOUS BLD VENIPUNCTURE: CPT

## 2018-08-31 PROCEDURE — 99285 EMERGENCY DEPT VISIT HI MDM: CPT

## 2018-08-31 PROCEDURE — 81001 URINALYSIS AUTO W/SCOPE: CPT

## 2018-08-31 PROCEDURE — 85730 THROMBOPLASTIN TIME PARTIAL: CPT

## 2018-08-31 PROCEDURE — 83735 ASSAY OF MAGNESIUM: CPT

## 2018-08-31 PROCEDURE — 83880 ASSAY OF NATRIURETIC PEPTIDE: CPT

## 2018-08-31 PROCEDURE — 85025 COMPLETE CBC W/AUTO DIFF WBC: CPT

## 2018-08-31 PROCEDURE — 85610 PROTHROMBIN TIME: CPT

## 2018-08-31 ASSESSMENT — ENCOUNTER SYMPTOMS
SHORTNESS OF BREATH: 0
ABDOMINAL PAIN: 0
SORE THROAT: 0
NAUSEA: 0
RHINORRHEA: 0
COLOR CHANGE: 0
COUGH: 0
VOMITING: 1
BLOOD IN STOOL: 0
DIARRHEA: 0

## 2018-09-01 ENCOUNTER — APPOINTMENT (OUTPATIENT)
Dept: GENERAL RADIOLOGY | Age: 73
DRG: 466 | End: 2018-09-01
Payer: MEDICAID

## 2018-09-01 ENCOUNTER — APPOINTMENT (OUTPATIENT)
Dept: CT IMAGING | Age: 73
DRG: 466 | End: 2018-09-01
Payer: MEDICAID

## 2018-09-01 PROBLEM — N39.0 UTI (URINARY TRACT INFECTION): Status: ACTIVE | Noted: 2018-09-01

## 2018-09-01 LAB
ALBUMIN SERPL-MCNC: 3 G/DL (ref 3.9–4.9)
ALP BLD-CCNC: 140 U/L (ref 40–130)
ALT SERPL-CCNC: 31 U/L (ref 0–33)
AMPHETAMINE SCREEN, URINE: NORMAL
ANION GAP SERPL CALCULATED.3IONS-SCNC: 15 MEQ/L (ref 7–13)
ANISOCYTOSIS: ABNORMAL
AST SERPL-CCNC: 46 U/L (ref 0–35)
BARBITURATE SCREEN URINE: NORMAL
BASE EXCESS ARTERIAL: -1 (ref -3–3)
BASE EXCESS ARTERIAL: -3 (ref -3–3)
BASE EXCESS ARTERIAL: -8 (ref -3–3)
BASOPHILS ABSOLUTE: 0 K/UL (ref 0–0.2)
BASOPHILS RELATIVE PERCENT: 0.5 %
BENZODIAZEPINE SCREEN, URINE: NORMAL
BILIRUB SERPL-MCNC: 0.3 MG/DL (ref 0–1.2)
BUN BLDV-MCNC: 16 MG/DL (ref 8–23)
CALCIUM IONIZED: 1.3 MMOL/L (ref 1.12–1.32)
CALCIUM IONIZED: 1.32 MMOL/L (ref 1.12–1.32)
CALCIUM SERPL-MCNC: 9.6 MG/DL (ref 8.6–10.2)
CANNABINOID SCREEN URINE: NORMAL
CHLORIDE BLD-SCNC: 109 MEQ/L (ref 98–107)
CO2: 22 MEQ/L (ref 22–29)
COCAINE METABOLITE SCREEN URINE: NORMAL
CREAT SERPL-MCNC: 1.19 MG/DL (ref 0.5–0.9)
EOSINOPHILS ABSOLUTE: 0 K/UL (ref 0–0.7)
EOSINOPHILS RELATIVE PERCENT: 1.8 %
GFR AFRICAN AMERICAN: 53
GFR AFRICAN AMERICAN: 53
GFR AFRICAN AMERICAN: 53.6
GFR NON-AFRICAN AMERICAN: 44
GFR NON-AFRICAN AMERICAN: 44
GFR NON-AFRICAN AMERICAN: 44.3
GLOBULIN: 4.1 G/DL (ref 2.3–3.5)
GLUCOSE BLD-MCNC: 107 MG/DL (ref 60–115)
GLUCOSE BLD-MCNC: 75 MG/DL (ref 60–115)
GLUCOSE BLD-MCNC: 84 MG/DL (ref 60–115)
GLUCOSE BLD-MCNC: 91 MG/DL (ref 74–109)
HCO3 ARTERIAL: 20 MMOL/L (ref 21–29)
HCO3 ARTERIAL: 22.9 MMOL/L (ref 21–29)
HCO3 ARTERIAL: 25.3 MMOL/L (ref 21–29)
HCT VFR BLD CALC: 40.4 % (ref 37–47)
HEMOGLOBIN: 11.9 GM/DL (ref 12–16)
HEMOGLOBIN: 13.3 G/DL (ref 12–16)
HEMOGLOBIN: 13.4 GM/DL (ref 12–16)
HYPOCHROMIA: 0
LACTATE: 0.57 MMOL/L (ref 0.4–2)
LACTATE: 1.26 MMOL/L (ref 0.4–2)
LACTATE: 1.36 MMOL/L (ref 0.4–2)
LACTIC ACID, SEPSIS: 0.7 MMOL/L (ref 0.5–1.9)
LACTIC ACID: 1.1 MMOL/L (ref 0.5–2.2)
LIPASE: 21 U/L (ref 13–60)
LYMPHOCYTES ABSOLUTE: 0.5 K/UL (ref 1–4.8)
LYMPHOCYTES RELATIVE PERCENT: 12 %
Lab: NORMAL
MACROCYTES: 0
MAGNESIUM: 2.1 MG/DL (ref 1.7–2.3)
MCH RBC QN AUTO: 26.2 PG (ref 27–31.3)
MCHC RBC AUTO-ENTMCNC: 33 % (ref 33–37)
MCV RBC AUTO: 79.4 FL (ref 82–100)
METHADONE SCREEN, URINE: NORMAL
METHAMPHETAMINE, URINE: NORMAL
MICROCYTES: 0
MONOCYTES ABSOLUTE: 0.1 K/UL (ref 0.2–0.8)
MONOCYTES RELATIVE PERCENT: 2.8 %
NEUTROPHILS ABSOLUTE: 3.2 K/UL (ref 1.4–6.5)
NEUTROPHILS RELATIVE PERCENT: 85 %
O2 SAT, ARTERIAL: 100 % (ref 93–100)
O2 SAT, ARTERIAL: 96 % (ref 93–100)
O2 SAT, ARTERIAL: 99 % (ref 93–100)
OPIATE SCREEN URINE: NORMAL
PCO2 ARTERIAL: 44 MM HG (ref 35–45)
PCO2 ARTERIAL: 48 MM HG (ref 35–45)
PCO2 ARTERIAL: 49 MM HG (ref 35–45)
PDW BLD-RTO: 21.6 % (ref 11.5–14.5)
PERFORMED ON: ABNORMAL
PERFORMED ON: NORMAL
PH ARTERIAL: 7.22 (ref 7.35–7.45)
PH ARTERIAL: 7.33 (ref 7.35–7.45)
PH ARTERIAL: 7.33 (ref 7.35–7.45)
PHENCYCLIDINE SCREEN URINE: NORMAL
PLATELET # BLD: 48 K/UL (ref 130–400)
PLATELET SLIDE REVIEW: ABNORMAL
PO2 ARTERIAL: 143 MM HG (ref 75–108)
PO2 ARTERIAL: 355 MM HG (ref 75–108)
PO2 ARTERIAL: 85 MM HG (ref 75–108)
POC CHLORIDE: 114 MEQ/L (ref 99–110)
POC CHLORIDE: 114 MEQ/L (ref 99–110)
POC CREATININE: 1.2 MG/DL (ref 0.6–1.2)
POC CREATININE: 1.2 MG/DL (ref 0.6–1.2)
POC FIO2: 100
POC FIO2: 100
POC FIO2: 2
POC HEMATOCRIT: 35 % (ref 36–48)
POC HEMATOCRIT: 40 % (ref 36–48)
POC POTASSIUM: 4.6 MEQ/L (ref 3.5–5.1)
POC POTASSIUM: 4.7 MEQ/L (ref 3.5–5.1)
POC SAMPLE TYPE: ABNORMAL
POC SODIUM: 146 MEQ/L (ref 136–145)
POC SODIUM: 148 MEQ/L (ref 136–145)
POIKILOCYTES: 0
POLYCHROMASIA: 0
POTASSIUM SERPL-SCNC: 4.8 MEQ/L (ref 3.5–5.1)
PRO-BNP: 434 PG/ML
PROPOXYPHENE SCREEN, URINE: NORMAL
RBC # BLD: 5.09 M/UL (ref 4.2–5.4)
SODIUM BLD-SCNC: 146 MEQ/L (ref 132–144)
TCO2 ARTERIAL: 22 (ref 22–29)
TCO2 ARTERIAL: 24 (ref 22–29)
TCO2 ARTERIAL: 27 (ref 22–29)
TOTAL CK: 36 U/L (ref 0–170)
TOTAL PROTEIN: 7.1 G/DL (ref 6.4–8.1)
TRICYCLIC, URINE: NORMAL
TROPONIN: 0.03 NG/ML (ref 0–0.01)
TSH SERPL DL<=0.05 MIU/L-ACNC: 6.54 UIU/ML (ref 0.27–4.2)
TSH SERPL DL<=0.05 MIU/L-ACNC: 8.04 UIU/ML (ref 0.27–4.2)
UR OXYCODONE RAPID SCREEN: NORMAL
WBC # BLD: 3.8 K/UL (ref 4.8–10.8)

## 2018-09-01 PROCEDURE — 71045 X-RAY EXAM CHEST 1 VIEW: CPT

## 2018-09-01 PROCEDURE — 84443 ASSAY THYROID STIM HORMONE: CPT

## 2018-09-01 PROCEDURE — 2500000003 HC RX 250 WO HCPCS: Performed by: INTERNAL MEDICINE

## 2018-09-01 PROCEDURE — 6360000002 HC RX W HCPCS: Performed by: PERSONAL EMERGENCY RESPONSE ATTENDANT

## 2018-09-01 PROCEDURE — 36556 INSERT NON-TUNNEL CV CATH: CPT

## 2018-09-01 PROCEDURE — 6360000002 HC RX W HCPCS: Performed by: NURSE PRACTITIONER

## 2018-09-01 PROCEDURE — 82330 ASSAY OF CALCIUM: CPT

## 2018-09-01 PROCEDURE — 6360000002 HC RX W HCPCS

## 2018-09-01 PROCEDURE — 6370000000 HC RX 637 (ALT 250 FOR IP): Performed by: INTERNAL MEDICINE

## 2018-09-01 PROCEDURE — 82565 ASSAY OF CREATININE: CPT

## 2018-09-01 PROCEDURE — 36415 COLL VENOUS BLD VENIPUNCTURE: CPT

## 2018-09-01 PROCEDURE — 0BH17EZ INSERTION OF ENDOTRACHEAL AIRWAY INTO TRACHEA, VIA NATURAL OR ARTIFICIAL OPENING: ICD-10-PCS | Performed by: INTERNAL MEDICINE

## 2018-09-01 PROCEDURE — 94761 N-INVAS EAR/PLS OXIMETRY MLT: CPT

## 2018-09-01 PROCEDURE — 80306 DRUG TEST PRSMV INSTRMNT: CPT

## 2018-09-01 PROCEDURE — 2500000003 HC RX 250 WO HCPCS

## 2018-09-01 PROCEDURE — 82803 BLOOD GASES ANY COMBINATION: CPT

## 2018-09-01 PROCEDURE — 94640 AIRWAY INHALATION TREATMENT: CPT

## 2018-09-01 PROCEDURE — 84295 ASSAY OF SERUM SODIUM: CPT

## 2018-09-01 PROCEDURE — 2700000000 HC OXYGEN THERAPY PER DAY

## 2018-09-01 PROCEDURE — 5A1945Z RESPIRATORY VENTILATION, 24-96 CONSECUTIVE HOURS: ICD-10-PCS | Performed by: INTERNAL MEDICINE

## 2018-09-01 PROCEDURE — 36600 WITHDRAWAL OF ARTERIAL BLOOD: CPT

## 2018-09-01 PROCEDURE — 82948 REAGENT STRIP/BLOOD GLUCOSE: CPT

## 2018-09-01 PROCEDURE — 82435 ASSAY OF BLOOD CHLORIDE: CPT

## 2018-09-01 PROCEDURE — 99291 CRITICAL CARE FIRST HOUR: CPT | Performed by: INTERNAL MEDICINE

## 2018-09-01 PROCEDURE — 94760 N-INVAS EAR/PLS OXIMETRY 1: CPT

## 2018-09-01 PROCEDURE — 94002 VENT MGMT INPAT INIT DAY: CPT

## 2018-09-01 PROCEDURE — 2000000000 HC ICU R&B

## 2018-09-01 PROCEDURE — 2580000003 HC RX 258: Performed by: PERSONAL EMERGENCY RESPONSE ATTENDANT

## 2018-09-01 PROCEDURE — 2580000003 HC RX 258: Performed by: INTERNAL MEDICINE

## 2018-09-01 PROCEDURE — 83605 ASSAY OF LACTIC ACID: CPT

## 2018-09-01 PROCEDURE — 84132 ASSAY OF SERUM POTASSIUM: CPT

## 2018-09-01 PROCEDURE — 70450 CT HEAD/BRAIN W/O DYE: CPT

## 2018-09-01 PROCEDURE — 85014 HEMATOCRIT: CPT

## 2018-09-01 PROCEDURE — 02HV33Z INSERTION OF INFUSION DEVICE INTO SUPERIOR VENA CAVA, PERCUTANEOUS APPROACH: ICD-10-PCS | Performed by: INTERNAL MEDICINE

## 2018-09-01 RX ORDER — ETOMIDATE 2 MG/ML
INJECTION INTRAVENOUS
Status: COMPLETED
Start: 2018-09-01 | End: 2018-09-01

## 2018-09-01 RX ORDER — DEXTROSE MONOHYDRATE 25 G/50ML
12.5 INJECTION, SOLUTION INTRAVENOUS PRN
Status: DISCONTINUED | OUTPATIENT
Start: 2018-09-01 | End: 2018-09-18 | Stop reason: HOSPADM

## 2018-09-01 RX ORDER — PANTOPRAZOLE SODIUM 40 MG/10ML
40 INJECTION, POWDER, LYOPHILIZED, FOR SOLUTION INTRAVENOUS DAILY
Status: DISCONTINUED | OUTPATIENT
Start: 2018-09-02 | End: 2018-09-03

## 2018-09-01 RX ORDER — FOLIC ACID 1 MG/1
1 TABLET ORAL DAILY
Status: DISCONTINUED | OUTPATIENT
Start: 2018-09-01 | End: 2018-09-18 | Stop reason: HOSPADM

## 2018-09-01 RX ORDER — SODIUM CHLORIDE 9 MG/ML
INJECTION, SOLUTION INTRAVENOUS CONTINUOUS
Status: DISPENSED | OUTPATIENT
Start: 2018-09-01 | End: 2018-09-01

## 2018-09-01 RX ORDER — NICOTINE POLACRILEX 4 MG
15 LOZENGE BUCCAL PRN
Status: DISCONTINUED | OUTPATIENT
Start: 2018-09-01 | End: 2018-09-18 | Stop reason: HOSPADM

## 2018-09-01 RX ORDER — FERROUS SULFATE 325(65) MG
325 TABLET ORAL
Status: DISCONTINUED | OUTPATIENT
Start: 2018-09-01 | End: 2018-09-05

## 2018-09-01 RX ORDER — PROPOFOL 10 MG/ML
INJECTION, EMULSION INTRAVENOUS
Status: COMPLETED
Start: 2018-09-01 | End: 2018-09-01

## 2018-09-01 RX ORDER — SODIUM CHLORIDE 0.9 % (FLUSH) 0.9 %
10 SYRINGE (ML) INJECTION PRN
Status: DISCONTINUED | OUTPATIENT
Start: 2018-09-01 | End: 2018-09-18 | Stop reason: HOSPADM

## 2018-09-01 RX ORDER — 0.9 % SODIUM CHLORIDE 0.9 %
10 VIAL (ML) INJECTION DAILY
Status: DISCONTINUED | OUTPATIENT
Start: 2018-09-02 | End: 2018-09-03

## 2018-09-01 RX ORDER — FAMOTIDINE 20 MG/1
20 TABLET, FILM COATED ORAL NIGHTLY
Status: DISCONTINUED | OUTPATIENT
Start: 2018-09-01 | End: 2018-09-01

## 2018-09-01 RX ORDER — ACETAMINOPHEN 325 MG/1
650 TABLET ORAL EVERY 4 HOURS PRN
Status: DISCONTINUED | OUTPATIENT
Start: 2018-09-01 | End: 2018-09-18 | Stop reason: HOSPADM

## 2018-09-01 RX ORDER — CALCIUM CARBONATE 500(1250)
500 TABLET ORAL DAILY
Status: DISCONTINUED | OUTPATIENT
Start: 2018-09-01 | End: 2018-09-18 | Stop reason: HOSPADM

## 2018-09-01 RX ORDER — SODIUM CHLORIDE 9 MG/ML
INJECTION, SOLUTION INTRAVENOUS CONTINUOUS
Status: DISCONTINUED | OUTPATIENT
Start: 2018-09-01 | End: 2018-09-01 | Stop reason: DRUGHIGH

## 2018-09-01 RX ORDER — SODIUM CHLORIDE 0.9 % (FLUSH) 0.9 %
10 SYRINGE (ML) INJECTION EVERY 12 HOURS SCHEDULED
Status: DISCONTINUED | OUTPATIENT
Start: 2018-09-01 | End: 2018-09-18 | Stop reason: HOSPADM

## 2018-09-01 RX ORDER — DEXTROSE MONOHYDRATE 50 MG/ML
100 INJECTION, SOLUTION INTRAVENOUS PRN
Status: DISCONTINUED | OUTPATIENT
Start: 2018-09-01 | End: 2018-09-18 | Stop reason: HOSPADM

## 2018-09-01 RX ORDER — 0.9 % SODIUM CHLORIDE 0.9 %
1000 INTRAVENOUS SOLUTION INTRAVENOUS ONCE
Status: DISCONTINUED | OUTPATIENT
Start: 2018-09-01 | End: 2018-09-05

## 2018-09-01 RX ORDER — ALBUTEROL SULFATE 90 UG/1
4 AEROSOL, METERED RESPIRATORY (INHALATION) 4 TIMES DAILY
Status: DISCONTINUED | OUTPATIENT
Start: 2018-09-01 | End: 2018-09-03

## 2018-09-01 RX ORDER — MEMANTINE HYDROCHLORIDE 5 MG/1
5 TABLET ORAL DAILY
Status: DISCONTINUED | OUTPATIENT
Start: 2018-09-01 | End: 2018-09-18 | Stop reason: HOSPADM

## 2018-09-01 RX ORDER — DIVALPROEX SODIUM 125 MG/1
125 CAPSULE, COATED PELLETS ORAL DAILY
Status: DISCONTINUED | OUTPATIENT
Start: 2018-09-01 | End: 2018-09-01

## 2018-09-01 RX ORDER — ONDANSETRON 2 MG/ML
4 INJECTION INTRAMUSCULAR; INTRAVENOUS EVERY 6 HOURS PRN
Status: DISCONTINUED | OUTPATIENT
Start: 2018-09-01 | End: 2018-09-18 | Stop reason: HOSPADM

## 2018-09-01 RX ORDER — CLINDAMYCIN PHOSPHATE 600 MG/50ML
600 INJECTION INTRAVENOUS EVERY 8 HOURS
Status: DISCONTINUED | OUTPATIENT
Start: 2018-09-01 | End: 2018-09-03

## 2018-09-01 RX ADMIN — PROPOFOL 1000 MG: 10 INJECTION, EMULSION INTRAVENOUS at 12:00

## 2018-09-01 RX ADMIN — Medication 4 PUFF: at 21:11

## 2018-09-01 RX ADMIN — ETOMIDATE 10 MG: 20 INJECTION, SOLUTION INTRAVENOUS at 12:10

## 2018-09-01 RX ADMIN — CLINDAMYCIN IN 5 PERCENT DEXTROSE 600 MG: 12 INJECTION, SOLUTION INTRAVENOUS at 19:01

## 2018-09-01 RX ADMIN — ONDANSETRON 4 MG: 2 INJECTION INTRAMUSCULAR; INTRAVENOUS at 09:28

## 2018-09-01 RX ADMIN — SODIUM CHLORIDE: 9 INJECTION, SOLUTION INTRAVENOUS at 01:02

## 2018-09-01 RX ADMIN — IPRATROPIUM BROMIDE 4 PUFF: 17 AEROSOL, METERED RESPIRATORY (INHALATION) at 23:16

## 2018-09-01 RX ADMIN — SODIUM CHLORIDE: 9 INJECTION, SOLUTION INTRAVENOUS at 04:25

## 2018-09-01 RX ADMIN — CEFTRIAXONE SODIUM 1 G: 1 INJECTION, POWDER, FOR SOLUTION INTRAMUSCULAR; INTRAVENOUS at 02:04

## 2018-09-01 ASSESSMENT — PULMONARY FUNCTION TESTS
PIF_VALUE: 16
PIF_VALUE: 26
PIF_VALUE: 18

## 2018-09-01 NOTE — PROGRESS NOTES
ED patient brought to the room and family sent to waiting room. Assessment made, physician at bedside, IVF infusing. Wounds marked and rectal probe placed for temperature monitoring. family brought to bedside, plan of care explained.  Call light within reach and bear hugger on patient

## 2018-09-01 NOTE — PROGRESS NOTES
Bear hugger remains on, temperature switched to medium. Rectal probe reading well,  HR in low 100's. Upon arrival HR was mid 80's to low 90's.  Will monitor heart rate

## 2018-09-01 NOTE — ED NOTES
Bed: 11  Expected date: 8/31/18  Expected time: 10:46 PM  Means of arrival: [facility]  Comments:  Lazarus Velasquez. 73YF. Vomiting, Lethargic x2 days, Rhonchi/Crackles. Dx with UTI yesterday in ER, Cipro prescribed. Hx of GI bleeding, Dementia, HTN, CKD. HR 39 per squad 70, /57. A+OX1, baseline.  80% on RA, %     Frandy Santos RN  08/31/18 0820

## 2018-09-01 NOTE — PROGRESS NOTES
Central line in R subclavian area was inserted. Blood loss 20ml.    Had good blood return in all 3 ports  Chest x ray to follow ordered

## 2018-09-01 NOTE — PROGRESS NOTES
Nutrition Assessment    Type and Reason for Visit: Initial, Consult, Positive Nutrition Screen (Consult for RD To Order and Manage Tube Feedings)    Nutrition Recommendations:     START TUBE FEEDINGS OF:    LOW CALORIE HIGH PROTEIN-15 ML/HR,  Increase Every 6 Hours to GOAL RATE of 45 ML/HR,  With 50 ml Water Flush Every 6 Hours. This Will Provide 1080 Kcals ( 1357 Total Kcals with current propofol ),  94.5 Grams of Protein and ~ 1100 ml free water. Malnutrition Assessment:  · Malnutrition Status: Insufficient data (Probable Malnutrtion if Weigh history is accurate.)  · Context: Acute illness or injury  · Findings of the 6 clinical characteristics of malnutrition (Minimum of 2 out of 6 clinical characteristics is required to make the diagnosis of moderate or severe Protein Calorie Malnutrition based on AND/ASPEN Guidelines):  1. Energy Intake-Not available, not able to assess    2. Weight Loss-20% loss or greater, in 3 months  3. Fat Loss-Unable to assess,    4. Muscle Loss-Unable to assess,    5. Fluid Accumulation-Mild fluid accumulation, Extremities  6.  Strength-Not measured    Nutrition Diagnosis:   · Problem: Inadequate oral intake  · Etiology: related to Impaired respiratory function-inability to consume food     Signs and symptoms:  as evidenced by NPO status due to medical condition, Intubation    Nutrition Assessment:  · Subjective Assessment: Patient from nursing home pta. Admitted with lethargy and Biliary Emesis pta. Noted poor dentition diet last admission- 7/2018- pureed with [place] Thick Liquids. · Nutrition-Focused Physical Findings: Trace Bilateral LE Edema. OGT. + small emesis-9/1 prior to intubation. Ivf: .9 ns- 100 ml/hr. Noted Central Line placed 9/1. Last BM- pta. Prop- 10.5 ml/hr. Levophed.   · Wound Type: Stage II (Sacrum per RN- 9/1)  · Current Nutrition Therapies:  · Oral Diet Orders: NPO   · Oral Diet intake: NPO  · Oral Nutrition Supplement (ONS) Orders: None  · ONS intake: NPO  · Anthropometric Measures:  · Ht: 5' 4\" (162.6 cm)   · Current Body Wt: 168 lb (76.2 kg)  · Admission Body Wt: 169 lb (76.7 kg)  · Usual Body Wt: 211 lb (95.7 kg) (5/9/18)  · % Weight Change: 20%,  ~ 3 Months  · Ideal Body Wt: 120 lb (54.4 kg), % Ideal Body > 100 %  · BMI Classification: BMI 25.0 - 29.9 Overweight  · Comparative Standards (Estimated Nutrition Needs):  · Estimated Daily Total Kcal: 1520 to 1675  · Estimated Daily Protein (g): 83 to 94    Estimated Intake vs Estimated Needs: Intake Less Than Needs    Nutrition Risk Level: High    Nutrition Interventions:   Start Tube Feeding  Continued Inpatient Monitoring, Education Not Indicated    Nutrition Evaluation:   · Evaluation: Goals set   · Goals: EN To Provide > 85% of Estimated Nutrient Needs. · Monitoring: Nausea or Vomiting, TF Tolerance, Gastric Residuals, Skin Integrity, Weight, Pertinent Labs    See Adult Nutrition Doc Flowsheet for more detail.      Electronically signed by Emily Schumacher RD, LD on 9/1/18 at 1:58 PM    Contact Number: [phone]

## 2018-09-01 NOTE — PROCEDURES
Dmitri Workman is a 75 y.o. female patient. 1. Hypothermia, initial encounter    2. Hypernatremia    3. Elevated TSH    4. Bilious vomiting, presence of nausea not specified    5. Disorientation    6. Urinary tract infection associated with indwelling urethral catheter, initial encounter ([facility]    7. Septicemia ([facility]    8. Acute respiratory failure with hypoxia (HCC)      Past Medical History:   Diagnosis Date    Alzheimer disease     Diabetes mellitus (HCC)     GERD (gastroesophageal reflux disease)     Hypertension     Osteoarthritis     Retinopathy      Blood pressure (!) 86/46, pulse 100, temperature 97.2 °F (36.2 °C), resp. rate 14, height 5' 4\" (1.626 m), weight 168 lb 1.6 oz (76.2 kg), SpO2 100 %, not currently breastfeeding. Central Line  Date/Time: 9/1/2018 4:19 PM  Performed by: Cody Garcia  Authorized by: Cody Garcia       Indications:  Need for central venous access    Consent:  Obtained from patient after explaining indications, risks, and alternatives. Anesthesia:  Topical anesthesia was applied with 1% lidocaine    Procedure:  Initially right neck was prepared with ChloraPrep and draped in a sterile fashion. One percent lidocaine was used to topically anesthetize the site and An ultrasound machine was used, right IJ was identified immediately and accessed with a needle at first attempt with good blood return. Guidewire was advanced through but would meet resistance at about 20 cm from top of the needle. After multiple attempts I reported that approach and switch to right femoral.  Right groin was prepared appropriately and sterilely, right femoral vein was accessed, Guidewire was passed Throughthe needle without resistance.   Needle was removed then track was dilated with dilators sheath and a triple-lumen central venous catheter was advanced over the guidewire and secured in place with 2 sutures after obtaining adequate venous blood flow from all 3 ports of the catheter which

## 2018-09-01 NOTE — PROGRESS NOTES
Critical Care Progress Note      Events of Last 24 hours: dr allen was called due to severe hypotension, emesis       Invasive Lines:      MV:  D#    Recent Labs      09/01/18   0107  09/01/18   0956   PHART  7.325*  7.328*   [id number]  49*  44   PO2ART  143*  85*       MV Settings:     / / /   ABGs: Lab Results   Component Value Date    PHART 7.328 09/01/2018    PO2ART 85 09/01/2018    JUW4LBR 44 09/01/2018       IV:   dextrose      sodium chloride 100 mL/hr at 09/01/18 0425    norepinephrine         Vitals:  /60   Pulse 108   Temp 97.2 °F (36.2 °C)   Resp 26   Ht 5' 4\" (1.626 m)   Wt 168 lb 1.6 oz (76.2 kg)   SpO2 97%   BMI 28.85 kg/m²  on   Tmax:  CVP:        Intake/Output Summary (Last 24 hours) at 09/01/18 1037  Last data filed at 09/01/18 0252   Gross per 24 hour   Intake               50 ml   Output                0 ml   Net               50 ml       EXAM:  General: nonresponsive  Eyes: PERRL. No sclera icterus. No conjunctival injection. ENT: No discharge. Pharynx clear. Neck: Trachea midline. Normal thyroid. Resp: No accessory muscle use. No crackles. No wheezing. No rhonchi. No dullness on percussion. CV: Regular rate. Regular rhythm. No mumur or rub. No edema. GI: Non-tender. Non-distended. No masses. No organmegaly. Normal bowel sounds. No hernia. Skin: Warm and dry. No nodule on exposed extremities. No rash on exposed extremities. Lymph: No cervical LAD. No supraclavicular LAD. M/S: No cyanosis. No joint deformity. No clubbing. Neuro: doesn't  Follows commands. Positive pupils/gag/corneals.    Psych: nonresponsive     Medications:  Scheduled Meds:   calcium elemental  500 mg Oral Daily    divalproex  125 mg Oral Daily    famotidine  20 mg Oral Nightly    ferrous sulfate  325 mg Oral Daily with breakfast    folic acid  1 mg Oral Daily    memantine  5 mg Oral Daily    sodium chloride flush  10 mL Intravenous 2 times per day    [START ON 9/2/2018] cefTRIAXone (ROCEPHIN) IV  1 g Intravenous Q24H       PRN Meds:  acetaminophen, sodium chloride flush, magnesium hydroxide, ondansetron, glucose, dextrose, glucagon (rDNA), dextrose    Results:  CBC: Recent Labs      08/30/18   1104  08/30/18   1245  08/31/18 2315 09/01/18 0107 09/01/18   0956   WBC  4.2*   --   3.8*   --    --    HGB  12.2   --   13.3  13.4  11.9*   HCT  37.2   --   40.4   --    --    MCV  79.0*   --   79.4*   --    --    PLT  Clumped  42*  48*   --    --      BMP: Recent Labs      08/30/18   0951  08/30/18   1245  08/31/18 2315 09/01/18 0107 09/01/18   0956   NA  141   --   146*   --    --    K  5.4*  5.3*  4.8   --    --    CL  109*   --   109*   --    --    CO2  20*   --   22   --    --    BUN  18   --   16   --    --    CREATININE  1.01*   --   1.19*  1.2  1.2     LIVER PROFILE:   Recent Labs      08/30/18   0951 08/31/18 2315   AST  36*  46*   ALT  23  31   LIPASE   --   21   BILITOT  0.3  0.3   ALKPHOS  132*  140*     PT/INR:   Recent Labs      08/31/18 2315   PROTIME  11.1   INR  1.1     APTT:   Recent Labs      08/31/18 2315   APTT  38.3*     UA:  Recent Labs      08/31/18 2315 08/31/18   2331   COLORU   --   Yellow   PHUR   --   5.0   WBCUA  10-20*   --    RBCUA  5-10*   --    BACTERIA  Few   --    CLARITYU   --   CLOUDY*   SPECGRAV   --   1.016   LEUKOCYTESUR   --   LARGE*   UROBILINOGEN   --   0.2   BILIRUBINUR   --   Negative   BLOODU   --   MODERATE*   GLUCOSEU   --   Negative       Cultures:      Films:  CXR reviewed by me and it showed       Assessment     Acute methabolic encephalopathy in setting of UTI- atbs, supportive care  Hypotension due to sepsis due to UTI, severe dehydration- IV NS bolus, started levophed  Alzheimer dementia- supportive care   Desaturation- due to aspiration?  Chest x ray ordered, atbs, pulmonary on case   CCT with this unstable pt in ICU spent 45 min            Electronically signed by Sheri Parsons MD on 9/1/2018 at 10:37 AM

## 2018-09-01 NOTE — H&P
leukopenia - Started on Rocephin. Previous cultures showed positive for Citrobacter koseri sensitive to rocephin. Urine cultures from the day prior showed gram negative rods with >100,000 colonies. Will continue antibiotics and fluids. Monitor vitals.      Dehydration - Will continue IVF and monitor       Pamela Alcaraz, [address] [place] [address]

## 2018-09-01 NOTE — ED PROVIDER NOTES
Physical Exam   Constitutional: She appears well-developed and well-nourished. HENT:   Head: Normocephalic and atraumatic. Mouth/Throat: Oropharynx is clear and moist.   Eyes: Pupils are equal, round, and reactive to light. Conjunctivae and EOM are normal.   Neck: Normal range of motion. Neck supple. No tracheal deviation present. Cardiovascular: Normal heart sounds and intact distal pulses. Pulmonary/Chest: Effort normal. No stridor. No respiratory distress. Rhonchi auscultated and audible, minimal labored respirations   Abdominal: Soft. Bowel sounds are normal. She exhibits no distension and no mass. There is no tenderness. There is no rebound and no guarding. Musculoskeletal: Normal range of motion. Neurological: She is alert. She has normal reflexes. Patient alert looking around the room, responds to painful stimuli. Does not follow commands   Skin: Skin is warm and dry. No rash noted. Psychiatric: She has a normal mood and affect.  Her behavior is normal. Judgment and thought content normal.       DIAGNOSTIC RESULTS     EKG: All EKG's are interpreted by the Emergency Department Physician who either signs or Co-signs this chart in the absence of a cardiologist.    EKG shows normal sinus rhythm, heart rate 71, normal intervals, normal axis, no ST segment changes    RADIOLOGY:   Non-plain film images such as CT, Ultrasound and MRI are read by the radiologist. Plain radiographic images are visualized and preliminarily interpreted by the emergency physician with the below findings:    Interpretation per the Radiologist below, if available at the time of this note:    XR CHEST PORTABLE    (Results Pending)   CT Head WO Contrast    (Results Pending)           LABS:  Labs Reviewed   APTT - Abnormal; Notable for the following:        Result Value    aPTT 38.3 (*)     All other components within normal limits   CBC WITH AUTO DIFFERENTIAL - Abnormal; Notable for the following:     WBC 3.8 (*) admitted. CRITICAL CARE TIME   Total Critical Care time was 45 minutes, excluding separately reportable procedures. There was a high probability of clinically significant/life threatening deterioration in the patient's condition which required my urgent intervention. Procedures    FINAL IMPRESSION      1. Hypothermia, initial encounter    2. Hypernatremia    3. Elevated TSH    4. Bilious vomiting, presence of nausea not specified    5. Disorientation    6. Urinary tract infection associated with indwelling urethral catheter, initial encounter ([facility]    7. Septicemia [facility])          DISPOSITION/PLAN   DISPOSITION Decision To Admit 08/31/2018 11:21:56 PM      PATIENT REFERRED TO:  No follow-up provider specified. DISCHARGE MEDICATIONS:  New Prescriptions    No medications on file          (Please note that portions of this note were completed with a voice recognition program.  Efforts were made to edit the dictations but occasionally words are mis-transcribed. )    CHRIS Robison (electronically signed)  Emergency Physician [address], [address]  09/01/18 1300 [facility], [address] [facility]  09/01/18 7034

## 2018-09-01 NOTE — CONSULTS
Inpatient consult to Pulmonology  Consult performed by: Ricardo Campo ordered by: Ifeoma Edwards        Critical Care Medicine  Consult Note      Reason for consultation:Acute respiratory failure and aspiration pneumonia    HISTORY OF PRESENT ILLNESS:    This is a 71-year-old -American female with history of stroke, dementia, who is a nursing home resident who presented to the emergency room with diagnosis of UTI and lethargy. She developed a bilious emesis for which she was brought back to the emergency room due to worsening respiratory distress and hypoxia initial x-ray was unremarkable patient did require increasing oxygen supplement to a nonrebreather mask. She also became hypotensive requiring fluid and pressor support. Patient had a right subclavian central line placed by Dr. Jasmine Hall, which was met with some difficulties and the line appeared to be going cephalad into the IJ. I attempted replacement of the line with a right internal jugular line under ultrasound. Blood return was obtained immediately with a needle but guidewire would stop at about 20 cm from tip of the needle. After multiple attempts reported the upper central venous access and placed a right femoral central venous access. Patient also was noted to be in significant distress with follow-up x-ray after the line showing significant volume loss in the right lung suggesting atelectasis due to airway secretions and probably aspirated objects.   I then intubated patient and placed her on the vent with recovery of large amount of yellow airway secretions          Past Medical History:        Diagnosis Date    Alzheimer disease     Diabetes mellitus ([address]     GERD (gastroesophageal reflux disease)     Hypertension     Osteoarthritis     Retinopathy        Past Surgical History:        Procedure Laterality Date    EYE SURGERY      HYSTERECTOMY      [place] EGD TRANSORAL BIOPSY SINGLE/MULTIPLE N/A 1/21/2017    EGD BIOPSY performed by Venkata Corrales MD at [address] [place] [address] EGD TRANSORAL BIOPSY SINGLE/MULTIPLE N/A 6/7/2018    EGD performed by Venkata Corrales MD at [place] [address] History:     reports that she has never smoked. She has never used smokeless tobacco. She reports that she does not drink alcohol or use drugs. Family History:   History reviewed. No pertinent family history. Allergies:  Patient has no known allergies. MEDICATIONS during current hospitalization:    Continuous Infusions:   dextrose      norepinephrine         Scheduled Meds:   calcium elemental  500 mg Oral Daily    divalproex  125 mg Oral Daily    famotidine  20 mg Oral Nightly    ferrous sulfate  325 mg Oral Daily with breakfast    folic acid  1 mg Oral Daily    memantine  5 mg Oral Daily    sodium chloride flush  10 mL Intravenous 2 times per day    [START ON 9/2/2018] cefTRIAXone (ROCEPHIN) IV  1 g Intravenous Q24H    clindamycin (CLEOCIN) IV  600 mg Intravenous Q8H    sodium chloride  1,000 mL Intravenous Once    propofol        albuterol sulfate HFA  4 puff Inhalation 4x daily    And    ipratropium  4 puff Inhalation 4x daily       PRN Meds:acetaminophen, sodium chloride flush, magnesium hydroxide, ondansetron, glucose, dextrose, glucagon (rDNA), dextrose        REVIEW OF SYSTEMS:  As in history of present illness  Other 14 point review of system is negative. PHYSICAL EXAM:    Vitals:  BP (!) 86/46   Pulse 100   Temp 97.2 °F (36.2 °C)   Resp 14   Ht 5' 4\" (1.626 m)   Wt 168 lb 1.6 oz (76.2 kg)   SpO2 100%   BMI 28.85 kg/m²   General: Patient is unresponsive to voice, questions or commands moaning periodically but otherwise no responses    Head: Atraumatic , Normocephalic   Eyes: PERRL. No icteric sclera. No conjunctival injection. No discharge   ENT: No nasal  discharge. Pharynx clear. Neck:  Trachea midline. No thyromegaly, no JVD, No cervical adenopathy.   Chest : Increased spontaneous effort, symmetric bilateral Comparison: Chest x-ray from August 30, 2018 Findings: Atherosclerotic calcification of the thoracic aorta. Surgical clips project over the cardiac silhouette. The cardiomediastinal silhouette is within normal limits. No pneumothorax, pleural effusion, or focal consolidation. Osseous structures of the thorax  are intact. IMPRESSION: No acute intrathoracic process. Xr Chest Portable    Result Date: 9/1/2018  EXAMINATION: PORTABLE CHEST X-RAY FROM 9/1/2018 AT 10:45 HOURS CLINICAL HISTORY: CHECK RIGHT CENTRAL VENOUS LINE PLACEMENT COMPARISONS: 8/31/2018 FINDINGS: There is borderline cardiomegaly. There is atherosclerotic tortuous aorta. The distal tip of the right central venous catheter is extending into the right side of the neck and presumably within the right internal jugular vein and the right CVC should be repositioned. There is no pneumothorax. There is subsegmental atelectasis or developing small streaky infiltrate in the left lung base. Rounded lucency superimposed on the left cardiac silhouette may very well represent a hiatal hernia. There is degenerative bone spurring throughout the spine. 1. DISTAL TIP OF RIGHT CENTRAL VENOUS LINE IS IN RIGHT SIDE OF THE NECK AND RIGHT CVC SHOULD BE REPOSITIONED. 2. NO EVIDENCE OF A PNEUMOTHORAX POST RIGHT CVC LINE PLACEMENT. 3. SUSPECT SUBSEGMENTAL ATELECTASIS OR PERHAPS DEVELOPING SMALL INFILTRATE IN LEFT LUNG BASE. 4. BORDERLINE CARDIOMEGALY AND ATHEROSCLEROTIC TORTUOUS AORTA. Xr Chest Portable    Result Date: 8/30/2018  EXAMINATION: CHEST PORTABLE VIEW  CLINICAL HISTORY: Short of breath COMPARISONS: July 22, 2018  FINDINGS: Single  views of the chest is submitted. Metallic density again overlying the cardiac silhouette. Unchanged The cardiac silhouette is of normal size configuration. The mediastinum is unremarkable. Pulmonary vascular unremarkable. Right sided trachea. No focal infiltrates. No Pneumothoraces.

## 2018-09-02 ENCOUNTER — APPOINTMENT (OUTPATIENT)
Dept: GENERAL RADIOLOGY | Age: 73
DRG: 466 | End: 2018-09-02
Payer: MEDICAID

## 2018-09-02 LAB
ALBUMIN SERPL-MCNC: 2.1 G/DL (ref 3.9–4.9)
ALP BLD-CCNC: 99 U/L (ref 40–130)
ALT SERPL-CCNC: 16 U/L (ref 0–33)
ANION GAP SERPL CALCULATED.3IONS-SCNC: 16 MEQ/L (ref 7–13)
ANISOCYTOSIS: ABNORMAL
AST SERPL-CCNC: 16 U/L (ref 0–35)
ATYPICAL LYMPHOCYTE RELATIVE PERCENT: 1 %
BANDED NEUTROPHILS RELATIVE PERCENT: 33 % (ref 5–11)
BASE EXCESS ARTERIAL: -5 (ref -3–3)
BASOPHILS ABSOLUTE: 0 K/UL (ref 0–0.2)
BASOPHILS RELATIVE PERCENT: 0.1 %
BILIRUB SERPL-MCNC: <0.2 MG/DL (ref 0–1.2)
BUN BLDV-MCNC: 20 MG/DL (ref 8–23)
CALCIUM IONIZED: 1.18 MMOL/L (ref 1.12–1.32)
CALCIUM SERPL-MCNC: 8.1 MG/DL (ref 8.6–10.2)
CHLORIDE BLD-SCNC: 113 MEQ/L (ref 98–107)
CO2: 17 MEQ/L (ref 22–29)
CREAT SERPL-MCNC: 1.91 MG/DL (ref 0.5–0.9)
EOSINOPHILS ABSOLUTE: 0 K/UL (ref 0–0.7)
EOSINOPHILS RELATIVE PERCENT: 0 %
GFR AFRICAN AMERICAN: 31
GFR AFRICAN AMERICAN: 31
GFR NON-AFRICAN AMERICAN: 25.6
GFR NON-AFRICAN AMERICAN: 26
GLOBULIN: 3.7 G/DL (ref 2.3–3.5)
GLUCOSE BLD-MCNC: 131 MG/DL (ref 60–115)
GLUCOSE BLD-MCNC: 143 MG/DL (ref 60–115)
GLUCOSE BLD-MCNC: 186 MG/DL (ref 74–109)
GLUCOSE BLD-MCNC: 189 MG/DL (ref 60–115)
GLUCOSE BLD-MCNC: 191 MG/DL (ref 60–115)
HCO3 ARTERIAL: 20.5 MMOL/L (ref 21–29)
HCT VFR BLD CALC: 34.7 % (ref 37–47)
HEMATOLOGY PATH CONSULT: YES
HEMOGLOBIN: 11.1 G/DL (ref 12–16)
HEMOGLOBIN: 11.6 GM/DL (ref 12–16)
HYPOCHROMIA: ABNORMAL
LACTATE: 2.46 MMOL/L (ref 0.4–2)
LACTIC ACID: 3.1 MMOL/L (ref 0.5–2.2)
LYMPHOCYTES ABSOLUTE: 0.5 K/UL (ref 1–4.8)
LYMPHOCYTES RELATIVE PERCENT: 1 %
MACROCYTES: ABNORMAL
MCH RBC QN AUTO: 25.6 PG (ref 27–31.3)
MCHC RBC AUTO-ENTMCNC: 31.9 % (ref 33–37)
MCV RBC AUTO: 80.3 FL (ref 82–100)
METAMYELOCYTES RELATIVE PERCENT: 1 %
MONOCYTES ABSOLUTE: 1 K/UL (ref 0.2–0.8)
MONOCYTES RELATIVE PERCENT: 4.3 %
NEUTROPHILS ABSOLUTE: 24.2 K/UL (ref 1.4–6.5)
NEUTROPHILS RELATIVE PERCENT: 59 %
O2 SAT, ARTERIAL: 99 % (ref 93–100)
ORGANISM: ABNORMAL
ORGANISM: ABNORMAL
PCO2 ARTERIAL: 34 MM HG (ref 35–45)
PDW BLD-RTO: 22.3 % (ref 11.5–14.5)
PERFORMED ON: ABNORMAL
PH ARTERIAL: 7.38 (ref 7.35–7.45)
PLATELET # BLD: 56 K/UL (ref 130–400)
PLATELET SLIDE REVIEW: ABNORMAL
PO2 ARTERIAL: 148 MM HG (ref 75–108)
POC CHLORIDE: 118 MEQ/L (ref 99–110)
POC CREATININE: 1.9 MG/DL (ref 0.6–1.2)
POC FIO2: 50
POC HEMATOCRIT: 34 % (ref 36–48)
POC POTASSIUM: 4.8 MEQ/L (ref 3.5–5.1)
POC SAMPLE TYPE: ABNORMAL
POC SODIUM: 148 MEQ/L (ref 136–145)
POTASSIUM REFLEX MAGNESIUM: 5 MEQ/L (ref 3.5–5.1)
RBC # BLD: 4.32 M/UL (ref 4.2–5.4)
SODIUM BLD-SCNC: 146 MEQ/L (ref 132–144)
TARGET CELLS: ABNORMAL
TCO2 ARTERIAL: 22 (ref 22–29)
TOTAL PROTEIN: 5.8 G/DL (ref 6.4–8.1)
URINE CULTURE, ROUTINE: ABNORMAL
WBC # BLD: 26 K/UL (ref 4.8–10.8)

## 2018-09-02 PROCEDURE — 36600 WITHDRAWAL OF ARTERIAL BLOOD: CPT

## 2018-09-02 PROCEDURE — 83605 ASSAY OF LACTIC ACID: CPT

## 2018-09-02 PROCEDURE — 6360000002 HC RX W HCPCS: Performed by: INTERNAL MEDICINE

## 2018-09-02 PROCEDURE — 82565 ASSAY OF CREATININE: CPT

## 2018-09-02 PROCEDURE — 71045 X-RAY EXAM CHEST 1 VIEW: CPT

## 2018-09-02 PROCEDURE — 2580000003 HC RX 258: Performed by: INTERNAL MEDICINE

## 2018-09-02 PROCEDURE — 94003 VENT MGMT INPAT SUBQ DAY: CPT

## 2018-09-02 PROCEDURE — 82948 REAGENT STRIP/BLOOD GLUCOSE: CPT

## 2018-09-02 PROCEDURE — 84132 ASSAY OF SERUM POTASSIUM: CPT

## 2018-09-02 PROCEDURE — 6360000002 HC RX W HCPCS: Performed by: NURSE PRACTITIONER

## 2018-09-02 PROCEDURE — 85025 COMPLETE CBC W/AUTO DIFF WBC: CPT

## 2018-09-02 PROCEDURE — 94760 N-INVAS EAR/PLS OXIMETRY 1: CPT

## 2018-09-02 PROCEDURE — 6370000000 HC RX 637 (ALT 250 FOR IP): Performed by: INTERNAL MEDICINE

## 2018-09-02 PROCEDURE — 2580000003 HC RX 258: Performed by: NURSE PRACTITIONER

## 2018-09-02 PROCEDURE — 99291 CRITICAL CARE FIRST HOUR: CPT | Performed by: INTERNAL MEDICINE

## 2018-09-02 PROCEDURE — 6370000000 HC RX 637 (ALT 250 FOR IP): Performed by: NURSE PRACTITIONER

## 2018-09-02 PROCEDURE — 82330 ASSAY OF CALCIUM: CPT

## 2018-09-02 PROCEDURE — 80053 COMPREHEN METABOLIC PANEL: CPT

## 2018-09-02 PROCEDURE — 82435 ASSAY OF BLOOD CHLORIDE: CPT

## 2018-09-02 PROCEDURE — 36592 COLLECT BLOOD FROM PICC: CPT

## 2018-09-02 PROCEDURE — 2000000000 HC ICU R&B

## 2018-09-02 PROCEDURE — 94761 N-INVAS EAR/PLS OXIMETRY MLT: CPT

## 2018-09-02 PROCEDURE — 2500000003 HC RX 250 WO HCPCS: Performed by: INTERNAL MEDICINE

## 2018-09-02 PROCEDURE — 84295 ASSAY OF SERUM SODIUM: CPT

## 2018-09-02 PROCEDURE — 94640 AIRWAY INHALATION TREATMENT: CPT

## 2018-09-02 PROCEDURE — 85014 HEMATOCRIT: CPT

## 2018-09-02 PROCEDURE — 2700000000 HC OXYGEN THERAPY PER DAY

## 2018-09-02 PROCEDURE — 82803 BLOOD GASES ANY COMBINATION: CPT

## 2018-09-02 PROCEDURE — 36415 COLL VENOUS BLD VENIPUNCTURE: CPT

## 2018-09-02 PROCEDURE — C9113 INJ PANTOPRAZOLE SODIUM, VIA: HCPCS | Performed by: INTERNAL MEDICINE

## 2018-09-02 RX ORDER — 0.9 % SODIUM CHLORIDE 0.9 %
500 INTRAVENOUS SOLUTION INTRAVENOUS ONCE
Status: COMPLETED | OUTPATIENT
Start: 2018-09-02 | End: 2018-09-02

## 2018-09-02 RX ORDER — PROPOFOL 10 MG/ML
10 INJECTION, EMULSION INTRAVENOUS
Status: DISCONTINUED | OUTPATIENT
Start: 2018-09-01 | End: 2018-09-04

## 2018-09-02 RX ORDER — SODIUM CHLORIDE 9 MG/ML
INJECTION, SOLUTION INTRAVENOUS CONTINUOUS
Status: DISCONTINUED | OUTPATIENT
Start: 2018-09-02 | End: 2018-09-04

## 2018-09-02 RX ADMIN — NOREPINEPHRINE BITARTRATE 20 MCG/MIN: 1 INJECTION INTRAVENOUS at 16:55

## 2018-09-02 RX ADMIN — MEMANTINE HYDROCHLORIDE 5 MG: 5 TABLET ORAL at 08:58

## 2018-09-02 RX ADMIN — SODIUM CHLORIDE: 9 INJECTION, SOLUTION INTRAVENOUS at 19:30

## 2018-09-02 RX ADMIN — Medication 4 PUFF: at 15:25

## 2018-09-02 RX ADMIN — Medication 4 PUFF: at 10:04

## 2018-09-02 RX ADMIN — FERROUS SULFATE TAB 325 MG (65 MG ELEMENTAL FE) 325 MG: 325 (65 FE) TAB at 08:58

## 2018-09-02 RX ADMIN — CEFTRIAXONE SODIUM 1 G: 1 INJECTION, POWDER, FOR SOLUTION INTRAMUSCULAR; INTRAVENOUS at 02:38

## 2018-09-02 RX ADMIN — INSULIN LISPRO 1 UNITS: 100 INJECTION, SOLUTION INTRAVENOUS; SUBCUTANEOUS at 14:06

## 2018-09-02 RX ADMIN — IPRATROPIUM BROMIDE 4 PUFF: 17 AEROSOL, METERED RESPIRATORY (INHALATION) at 10:04

## 2018-09-02 RX ADMIN — SODIUM CHLORIDE: 9 INJECTION, SOLUTION INTRAVENOUS at 10:10

## 2018-09-02 RX ADMIN — SODIUM CHLORIDE: 9 INJECTION, SOLUTION INTRAVENOUS at 06:13

## 2018-09-02 RX ADMIN — ACETAMINOPHEN 650 MG: 325 TABLET ORAL at 00:50

## 2018-09-02 RX ADMIN — IPRATROPIUM BROMIDE 4 PUFF: 17 AEROSOL, METERED RESPIRATORY (INHALATION) at 19:18

## 2018-09-02 RX ADMIN — Medication 10 ML: at 08:58

## 2018-09-02 RX ADMIN — CLINDAMYCIN IN 5 PERCENT DEXTROSE 600 MG: 12 INJECTION, SOLUTION INTRAVENOUS at 18:24

## 2018-09-02 RX ADMIN — IPRATROPIUM BROMIDE 4 PUFF: 17 AEROSOL, METERED RESPIRATORY (INHALATION) at 15:24

## 2018-09-02 RX ADMIN — FOLIC ACID 1 MG: 1 TABLET ORAL at 08:58

## 2018-09-02 RX ADMIN — PROPOFOL 10 MCG/KG/MIN: 10 INJECTION, EMULSION INTRAVENOUS at 01:16

## 2018-09-02 RX ADMIN — CLINDAMYCIN IN 5 PERCENT DEXTROSE 600 MG: 12 INJECTION, SOLUTION INTRAVENOUS at 10:50

## 2018-09-02 RX ADMIN — INSULIN LISPRO 1 UNITS: 100 INJECTION, SOLUTION INTRAVENOUS; SUBCUTANEOUS at 18:40

## 2018-09-02 RX ADMIN — Medication 4 PUFF: at 19:18

## 2018-09-02 RX ADMIN — IPRATROPIUM BROMIDE 4 PUFF: 17 AEROSOL, METERED RESPIRATORY (INHALATION) at 04:23

## 2018-09-02 RX ADMIN — NOREPINEPHRINE BITARTRATE 22 MCG/MIN: 1 INJECTION INTRAVENOUS at 01:18

## 2018-09-02 RX ADMIN — Medication 500 MG: at 08:58

## 2018-09-02 RX ADMIN — Medication 10 ML: at 09:05

## 2018-09-02 RX ADMIN — Medication 4 PUFF: at 04:23

## 2018-09-02 RX ADMIN — PANTOPRAZOLE SODIUM 40 MG: 40 INJECTION, POWDER, FOR SOLUTION INTRAVENOUS at 08:58

## 2018-09-02 RX ADMIN — CLINDAMYCIN IN 5 PERCENT DEXTROSE 600 MG: 12 INJECTION, SOLUTION INTRAVENOUS at 03:32

## 2018-09-02 RX ADMIN — SODIUM CHLORIDE 500 ML: 9 INJECTION, SOLUTION INTRAVENOUS at 06:12

## 2018-09-02 ASSESSMENT — PAIN SCALES - GENERAL
PAINLEVEL_OUTOF10: 0

## 2018-09-02 ASSESSMENT — PULMONARY FUNCTION TESTS
PIF_VALUE: 13
PIF_VALUE: 15
PIF_VALUE: 13
PIF_VALUE: 16
PIF_VALUE: 20
PIF_VALUE: 7.9

## 2018-09-02 NOTE — CARE COORDINATION
Patient  On Vent per MD  Note possible attempt to wean tomorrow if stable. Patient from [facility] will continue to follow.

## 2018-09-02 NOTE — PROGRESS NOTES
CRITICAL CARE PROGRESS NOTES    PATIENT NAME: Liss Nova  MRN: [id number]  SERVICE DATE:  September 2, 2018   SERVICE TIME:  11:37 AM      PRIMARY SERVICE: Critical care medicine    CHIEF COMPLAINTS: Sedated intubated on the vent    INTERVAL HPI: Patient seen and examined at bedside, Interval Notes, orders reviewed. Discussed on multidisciplinary rounds  Patient remains sedated with low-dose propofol at only 10 µg currently but not arousable to voice yet moves extremities spontaneously off-and-on. Her oxygenation status has continued to improve blood gases showed significant improvement today. Chest x-ray showed improving right lung volume loss with reexpansion of the lung and residual atelectatic changes as well as left lower lobe infiltrate. She has low-grade fevers hemodynamic status is stable at this point      OBJECTIVE    Body mass index is 31.18 kg/m². PHYSICAL EXAM:  Vitals:  BP (!) 116/40   Pulse 104   Temp 100.8 °F (38.2 °C) (Rectal)   Resp 21   Ht 5' 4\" (1.626 m)   Wt 181 lb 10.5 oz (82.4 kg)   SpO2 99%   BMI 31.18 kg/m²   General: Patient is sedated, intubated orally, on the vent, otherwise appeared  comfortable in bed, No distress. Head: Atraumatic , Normocephalic   Eyes: PERRL. No icteric sclera. No conjunctival injection. No discharge   ENT: No nasal  discharge. Pharynx clear. Neck:  Trachea midline. No thyromegaly, no JVD, No cervical adenopathy. Chest : Controlled vent effort, symmetric bilateral excursions  Lung : Diminished breath sounds bilaterally, occasional rhonchi  Heart[de-identified] Regular rhythm and rate. No mumur ,  Rub or gallop  ABD: Benign. Non-tender. Non-distended. No masses or organmegaly. Normal bowel sounds. EXT: Minimal Pitting edema both lower extremities , No Cyanosis No clubbing  Neuro: no focal weakness  Skin: Warm and dry. No erythema or rash on exposed extremities.       DATA:   Recent Labs      08/31/18   2315   09/02/18   0410  09/02/18   0532   WBC  3.8* with infiltrate. Linear right lung base opacities are present. Retrocardiac lucency compatible with hiatal hernia. No pneumothorax or pleural effusion. Osseous structures are intact. Left lung base opacity compatible with infiltrate. Linear right lung base opacity likely on the basis of atelectasis and/or infiltrate. Xr Chest Portable    Result Date: 9/1/2018  Portable chest radiograph History: Intubation. Technique: AP portable view of the chest obtained. Comparison: Chest radiograph from September 1, 2018, 1045 hours Findings: Interval placement of an endotracheal tube, its tip terminating at the level of the clavicles. Interval placement of an enteric tube, which abruptly curves just proximal to the diaphragm likely secondary to the patient's hiatal hernia. Atherosclerotic ossification of the thoracic aorta. Continued mediastinal silhouette is within normal limits. Patchy bilateral opacities within each lung base. A pneumothorax or pleural effusion. Interval placement of endotracheal tube, terminating at the level of the clavicles. Enteric tube is present and abruptly turns proximal diaphragm likely due to the patient's hiatal hernia. Subsegmental atelectasis versus small infiltrate in both lung bases. Xr Chest Portable    Result Date: 9/1/2018  Portable chest radiograph History: Shortness of breath Technique: AP portable view of the chest obtained. Comparison: Chest x-ray from August 30, 2018 Findings: Atherosclerotic calcification of the thoracic aorta. Surgical clips project over the cardiac silhouette. The cardiomediastinal silhouette is within normal limits. No pneumothorax, pleural effusion, or focal consolidation. Osseous structures of the thorax  are intact. IMPRESSION: No acute intrathoracic process.     Xr Chest Portable    Result Date: 9/1/2018  EXAMINATION: PORTABLE CHEST X-RAY FROM 9/1/2018 AT 10:45 HOURS CLINICAL HISTORY: CHECK RIGHT CENTRAL VENOUS LINE PLACEMENT COMPARISONS: 8/31/2018 FINDINGS: There is borderline cardiomegaly. There is atherosclerotic tortuous aorta. The distal tip of the right central venous catheter is extending into the right side of the neck and presumably within the right internal jugular vein and the right CVC should be repositioned. There is no pneumothorax. There is subsegmental atelectasis or developing small streaky infiltrate in the left lung base. Rounded lucency superimposed on the left cardiac silhouette may very well represent a hiatal hernia. There is degenerative bone spurring throughout the spine. 1. DISTAL TIP OF RIGHT CENTRAL VENOUS LINE IS IN RIGHT SIDE OF THE NECK AND RIGHT CVC SHOULD BE REPOSITIONED. 2. NO EVIDENCE OF A PNEUMOTHORAX POST RIGHT CVC LINE PLACEMENT. 3. SUSPECT SUBSEGMENTAL ATELECTASIS OR PERHAPS DEVELOPING SMALL INFILTRATE IN LEFT LUNG BASE. 4. BORDERLINE CARDIOMEGALY AND ATHEROSCLEROTIC TORTUOUS AORTA. Xr Chest Portable    Result Date: 8/30/2018  EXAMINATION: CHEST PORTABLE VIEW  CLINICAL HISTORY: Short of breath COMPARISONS: July 22, 2018  FINDINGS: Single  views of the chest is submitted. Metallic density again overlying the cardiac silhouette. Unchanged The cardiac silhouette is of normal size configuration. The mediastinum is unremarkable. Pulmonary vascular unremarkable. Right sided trachea. No focal infiltrates. No Pneumothoraces. NO ACUTE ACTIVE CARDIOPULMONARY PROCESS            IMPRESSION AND SUGGESTION:  1. Acute hypoxemic respiratory failure secondary to aspiration pneumonia, patient is improving since intubation, large amount of airway secretions removed with suctioning, decreasing today. 2. Aspiration pneumonia with volume loss in the right lung status post intubation, continue with aggressive antibiotic therapy, suctioning, and will continue to follow  3.  UTI on broad-spectrum antibiotic treatment, culture positive for enterococcus group D   4. Encephalopathy associated with sepsis and respiratory distress in addition to underlying dementia, she is likely sedated now, we will attempt stop sedation completely tomorrow and once arousable we can attempt to wean and extubate  5. Hypernatremia associated with volume loss, dehydration, vomiting, improving, down to 146 today  6. Acute kidney injury with underlying chronic kidney disease and mild metabolic acidosis  Discussed today's evaluation, status, x-ray and blood results, as well as plans for weaning tomorrow if she continues to improve, with patient's daughter at the bedside. We'll continue all current measures reevaluate in the morning for possible weaning to extubate  I spent 37 minutes providing critical care. This time is excluding time spent performing procedures.       Electronically signed by Debbie Jones MD, FCCP on 9/2/2018 at 11:37 AM

## 2018-09-02 NOTE — PROGRESS NOTES
Hospitalist Progress Note      PCP: Tarik Grace MD    Date of Admission: 8/31/2018    Chief Complaint:     Subjective: pt intubated, sedated, looks comfortable     Medications:  Reviewed    Infusion Medications    propofol 10 mcg/kg/min (09/02/18 0810)    sodium chloride 100 mL/hr at 09/02/18 1010    dextrose      norepinephrine 20 mcg/min (09/02/18 0601)     Scheduled Medications    insulin lispro  0-6 Units Subcutaneous 4 times per day    calcium elemental  500 mg Oral Daily    ferrous sulfate  325 mg Oral Daily with breakfast    folic acid  1 mg Oral Daily    memantine  5 mg Oral Daily    sodium chloride flush  10 mL Intravenous 2 times per day    cefTRIAXone (ROCEPHIN) IV  1 g Intravenous Q24H    clindamycin (CLEOCIN) IV  600 mg Intravenous Q8H    sodium chloride  1,000 mL Intravenous Once    albuterol sulfate HFA  4 puff Inhalation 4x daily    And    ipratropium  4 puff Inhalation 4x daily    pantoprazole  40 mg Intravenous Daily    And    sodium chloride (PF)  10 mL Intravenous Daily     PRN Meds: acetaminophen, sodium chloride flush, magnesium hydroxide, ondansetron, glucose, dextrose, glucagon (rDNA), dextrose      Intake/Output Summary (Last 24 hours) at 09/02/18 1120  Last data filed at 09/02/18 0601   Gross per 24 hour   Intake           4706.9 ml   Output             1175 ml   Net           3531.9 ml       Exam:    BP (!) 116/40   Pulse 104   Temp 100.8 °F (38.2 °C) (Rectal)   Resp 21   Ht 5' 4\" (1.626 m)   Wt 181 lb 10.5 oz (82.4 kg)   SpO2 99%   BMI 31.18 kg/m²     General appearance: No apparent distress, appears stated age   HEENT: Pupils equal, round, and reactive to light. Conjunctivae/corneas clear. Neck: Supple, with full range of motion. No jugular venous distention. Trachea midline. Respiratory:  Normal respiratory effort. Clear to auscultation, bilaterally without Rales/Wheezes/Rhonchi.   Cardiovascular: Regular rate and rhythm with normal S1/S2 without murmurs, rubs or gallops. Abdomen: Soft, non-tender, non-distended with normal bowel sounds. Musculoskeletal: No clubbing, cyanosis or edema bilaterally. .  Skin: Skin color, texture, turgor normal.  No rashes or lesions. Neurologic: sedated  Psychiatric: sedated  Capillary Refill: Brisk,< 3 seconds   Peripheral Pulses: +2 palpable, equal bilaterally       Labs:   Recent Labs      08/30/18   1245  08/31/18   2315   09/01/18   0956  09/02/18   0410  09/02/18   0532   WBC   --   3.8*   --    --   26.0*   --    HGB   --   13.3   < >  11.9*  11.1*  11.6*   HCT   --   40.4   --    --   34.7*   --    PLT  42*  48*   --    --   56*   --     < > = values in this interval not displayed. Recent Labs      08/30/18 1245 08/31/18 2315 09/01/18   0956  09/02/18   0409  09/02/18   0532   NA   --   146*   --    --   146*   --    K  5.3*  4.8   --    --   5.0   --    CL   --   109*   --    --   113*   --    CO2   --   22   --    --   17*   --    BUN   --   16   --    --   20   --    CREATININE   --   1.19*   < >  1.2  1.91*  1.9*   CALCIUM   --   9.6   --    --   8.1*   --     < > = values in this interval not displayed. Recent Labs      08/31/18 2315 09/02/18   0409   AST  46*  16   ALT  31  16   BILITOT  0.3  <0.2   ALKPHOS  140*  99     Recent Labs      08/31/18 2315   INR  1.1     Recent Labs      08/31/18 2315   CKTOTAL  36   TROPONINI  0.027*       Urinalysis:    Lab Results   Component Value Date    NITRU Negative 08/31/2018    WBCUA 10-20 08/31/2018    BACTERIA Few 08/31/2018    RBCUA 5-10 08/31/2018    BLOODU MODERATE 08/31/2018    SPECGRAV 1.016 08/31/2018    GLUCOSEU Negative 08/31/2018       Radiology:  XR CHEST PORTABLE   Final Result      Left lung base opacity compatible with infiltrate. Linear right lung base opacity likely on the basis of atelectasis and/or infiltrate. XR CHEST PORTABLE   Final Result      Interval placement of endotracheal tube, terminating at the level of the clavicles.

## 2018-09-03 ENCOUNTER — APPOINTMENT (OUTPATIENT)
Dept: GENERAL RADIOLOGY | Age: 73
DRG: 466 | End: 2018-09-03
Payer: MEDICAID

## 2018-09-03 LAB
ABO/RH: NORMAL
ANTIBODY SCREEN: NORMAL
BASE EXCESS ARTERIAL: -4 (ref -3–3)
BASE EXCESS ARTERIAL: -4 (ref -3–3)
CALCIUM IONIZED: 1.34 MMOL/L (ref 1.12–1.32)
GFR AFRICAN AMERICAN: 48
GFR NON-AFRICAN AMERICAN: 40
GLUCOSE BLD-MCNC: 114 MG/DL (ref 60–115)
GLUCOSE BLD-MCNC: 115 MG/DL (ref 60–115)
GLUCOSE BLD-MCNC: 156 MG/DL (ref 60–115)
HCO3 ARTERIAL: 21.9 MMOL/L (ref 21–29)
HCO3 ARTERIAL: 21.9 MMOL/L (ref 21–29)
HCT VFR BLD CALC: 27.5 % (ref 37–47)
HCT VFR BLD CALC: 28 % (ref 37–47)
HEMOGLOBIN: 7.7 GM/DL (ref 12–16)
HEMOGLOBIN: 9 G/DL (ref 12–16)
HEMOGLOBIN: 9.1 G/DL (ref 12–16)
LACTATE: 0.67 MMOL/L (ref 0.4–2)
LACTATE: 0.69 MMOL/L (ref 0.4–2)
O2 SAT, ARTERIAL: 97 % (ref 93–100)
O2 SAT, ARTERIAL: 98 % (ref 93–100)
PCO2 ARTERIAL: 40 MM HG (ref 35–45)
PCO2 ARTERIAL: 42 MM HG (ref 35–45)
PERFORMED ON: ABNORMAL
PERFORMED ON: ABNORMAL
PERFORMED ON: NORMAL
PERFORMED ON: NORMAL
PH ARTERIAL: 7.33 (ref 7.35–7.45)
PH ARTERIAL: 7.35 (ref 7.35–7.45)
PO2 ARTERIAL: 103 MM HG (ref 75–108)
PO2 ARTERIAL: 97 MM HG (ref 75–108)
POC CHLORIDE: 118 MEQ/L (ref 99–110)
POC CREATININE: 1.3 MG/DL (ref 0.6–1.2)
POC FIO2: 30
POC FIO2: 30
POC HEMATOCRIT: 23 % (ref 36–48)
POC POTASSIUM: 3.7 MEQ/L (ref 3.5–5.1)
POC SAMPLE TYPE: ABNORMAL
POC SAMPLE TYPE: ABNORMAL
POC SODIUM: 149 MEQ/L (ref 136–145)
TCO2 ARTERIAL: 23 (ref 22–29)
TCO2 ARTERIAL: 23 (ref 22–29)

## 2018-09-03 PROCEDURE — 94640 AIRWAY INHALATION TREATMENT: CPT

## 2018-09-03 PROCEDURE — 99291 CRITICAL CARE FIRST HOUR: CPT | Performed by: INTERNAL MEDICINE

## 2018-09-03 PROCEDURE — 82565 ASSAY OF CREATININE: CPT

## 2018-09-03 PROCEDURE — 2580000003 HC RX 258: Performed by: NURSE PRACTITIONER

## 2018-09-03 PROCEDURE — 36415 COLL VENOUS BLD VENIPUNCTURE: CPT

## 2018-09-03 PROCEDURE — 94003 VENT MGMT INPAT SUBQ DAY: CPT

## 2018-09-03 PROCEDURE — 82330 ASSAY OF CALCIUM: CPT

## 2018-09-03 PROCEDURE — 2000000000 HC ICU R&B

## 2018-09-03 PROCEDURE — 2700000000 HC OXYGEN THERAPY PER DAY

## 2018-09-03 PROCEDURE — 86923 COMPATIBILITY TEST ELECTRIC: CPT

## 2018-09-03 PROCEDURE — 82948 REAGENT STRIP/BLOOD GLUCOSE: CPT

## 2018-09-03 PROCEDURE — 2580000003 HC RX 258: Performed by: INTERNAL MEDICINE

## 2018-09-03 PROCEDURE — P9016 RBC LEUKOCYTES REDUCED: HCPCS

## 2018-09-03 PROCEDURE — 6370000000 HC RX 637 (ALT 250 FOR IP): Performed by: NURSE PRACTITIONER

## 2018-09-03 PROCEDURE — 36600 WITHDRAWAL OF ARTERIAL BLOOD: CPT

## 2018-09-03 PROCEDURE — 86900 BLOOD TYPING SEROLOGIC ABO: CPT

## 2018-09-03 PROCEDURE — 2500000003 HC RX 250 WO HCPCS: Performed by: INTERNAL MEDICINE

## 2018-09-03 PROCEDURE — 85014 HEMATOCRIT: CPT

## 2018-09-03 PROCEDURE — C9113 INJ PANTOPRAZOLE SODIUM, VIA: HCPCS | Performed by: INTERNAL MEDICINE

## 2018-09-03 PROCEDURE — 86850 RBC ANTIBODY SCREEN: CPT

## 2018-09-03 PROCEDURE — 86022 PLATELET ANTIBODIES: CPT

## 2018-09-03 PROCEDURE — 84295 ASSAY OF SERUM SODIUM: CPT

## 2018-09-03 PROCEDURE — 85018 HEMOGLOBIN: CPT

## 2018-09-03 PROCEDURE — 6360000002 HC RX W HCPCS: Performed by: INTERNAL MEDICINE

## 2018-09-03 PROCEDURE — 6370000000 HC RX 637 (ALT 250 FOR IP): Performed by: INTERNAL MEDICINE

## 2018-09-03 PROCEDURE — 36430 TRANSFUSION BLD/BLD COMPNT: CPT

## 2018-09-03 PROCEDURE — 71045 X-RAY EXAM CHEST 1 VIEW: CPT

## 2018-09-03 PROCEDURE — 99222 1ST HOSP IP/OBS MODERATE 55: CPT | Performed by: INTERNAL MEDICINE

## 2018-09-03 PROCEDURE — 6360000002 HC RX W HCPCS: Performed by: NURSE PRACTITIONER

## 2018-09-03 PROCEDURE — 94760 N-INVAS EAR/PLS OXIMETRY 1: CPT

## 2018-09-03 PROCEDURE — 86901 BLOOD TYPING SEROLOGIC RH(D): CPT

## 2018-09-03 PROCEDURE — 82803 BLOOD GASES ANY COMBINATION: CPT

## 2018-09-03 PROCEDURE — 83605 ASSAY OF LACTIC ACID: CPT

## 2018-09-03 PROCEDURE — 2580000003 HC RX 258

## 2018-09-03 PROCEDURE — 82435 ASSAY OF BLOOD CHLORIDE: CPT

## 2018-09-03 PROCEDURE — 84132 ASSAY OF SERUM POTASSIUM: CPT

## 2018-09-03 RX ORDER — SODIUM CHLORIDE 9 MG/ML
INJECTION, SOLUTION INTRAVENOUS
Status: COMPLETED
Start: 2018-09-03 | End: 2018-09-03

## 2018-09-03 RX ORDER — 0.9 % SODIUM CHLORIDE 0.9 %
250 INTRAVENOUS SOLUTION INTRAVENOUS ONCE
Status: COMPLETED | OUTPATIENT
Start: 2018-09-03 | End: 2018-09-03

## 2018-09-03 RX ORDER — PANTOPRAZOLE SODIUM 40 MG/10ML
40 INJECTION, POWDER, LYOPHILIZED, FOR SOLUTION INTRAVENOUS 2 TIMES DAILY
Status: DISCONTINUED | OUTPATIENT
Start: 2018-09-03 | End: 2018-09-05

## 2018-09-03 RX ORDER — IPRATROPIUM BROMIDE AND ALBUTEROL SULFATE 2.5; .5 MG/3ML; MG/3ML
1 SOLUTION RESPIRATORY (INHALATION) 4 TIMES DAILY
Status: DISCONTINUED | OUTPATIENT
Start: 2018-09-03 | End: 2018-09-06

## 2018-09-03 RX ORDER — 0.9 % SODIUM CHLORIDE 0.9 %
10 VIAL (ML) INJECTION 2 TIMES DAILY
Status: DISCONTINUED | OUTPATIENT
Start: 2018-09-03 | End: 2018-09-05

## 2018-09-03 RX ADMIN — INSULIN LISPRO 1 UNITS: 100 INJECTION, SOLUTION INTRAVENOUS; SUBCUTANEOUS at 05:58

## 2018-09-03 RX ADMIN — Medication 250 ML: at 09:50

## 2018-09-03 RX ADMIN — CEFTRIAXONE SODIUM 1 G: 1 INJECTION, POWDER, FOR SOLUTION INTRAMUSCULAR; INTRAVENOUS at 01:58

## 2018-09-03 RX ADMIN — PANTOPRAZOLE SODIUM 40 MG: 40 INJECTION, POWDER, FOR SOLUTION INTRAVENOUS at 22:43

## 2018-09-03 RX ADMIN — Medication 4 PUFF: at 04:39

## 2018-09-03 RX ADMIN — PANTOPRAZOLE SODIUM 40 MG: 40 INJECTION, POWDER, FOR SOLUTION INTRAVENOUS at 09:55

## 2018-09-03 RX ADMIN — Medication 500 MG: at 09:51

## 2018-09-03 RX ADMIN — CLINDAMYCIN IN 5 PERCENT DEXTROSE 600 MG: 12 INJECTION, SOLUTION INTRAVENOUS at 02:39

## 2018-09-03 RX ADMIN — Medication 10 ML: at 09:56

## 2018-09-03 RX ADMIN — CEFEPIME 1 G: 1 INJECTION, POWDER, FOR SOLUTION INTRAMUSCULAR; INTRAVENOUS at 09:51

## 2018-09-03 RX ADMIN — FOLIC ACID 1 MG: 1 TABLET ORAL at 09:55

## 2018-09-03 RX ADMIN — Medication 10 ML: at 22:44

## 2018-09-03 RX ADMIN — SODIUM CHLORIDE: 9 INJECTION, SOLUTION INTRAVENOUS at 05:28

## 2018-09-03 RX ADMIN — SODIUM CHLORIDE 250 ML: 9 INJECTION, SOLUTION INTRAVENOUS at 09:50

## 2018-09-03 RX ADMIN — IPRATROPIUM BROMIDE 4 PUFF: 17 AEROSOL, METERED RESPIRATORY (INHALATION) at 04:39

## 2018-09-03 RX ADMIN — CLINDAMYCIN IN 5 PERCENT DEXTROSE 600 MG: 12 INJECTION, SOLUTION INTRAVENOUS at 10:22

## 2018-09-03 RX ADMIN — Medication 10 ML: at 22:43

## 2018-09-03 RX ADMIN — IPRATROPIUM BROMIDE AND ALBUTEROL SULFATE 1 AMPULE: .5; 3 SOLUTION RESPIRATORY (INHALATION) at 19:25

## 2018-09-03 RX ADMIN — MEMANTINE HYDROCHLORIDE 5 MG: 5 TABLET ORAL at 09:55

## 2018-09-03 RX ADMIN — IPRATROPIUM BROMIDE AND ALBUTEROL SULFATE 1 AMPULE: .5; 3 SOLUTION RESPIRATORY (INHALATION) at 16:07

## 2018-09-03 RX ADMIN — FERROUS SULFATE TAB 325 MG (65 MG ELEMENTAL FE) 325 MG: 325 (65 FE) TAB at 09:55

## 2018-09-03 RX ADMIN — IPRATROPIUM BROMIDE 4 PUFF: 17 AEROSOL, METERED RESPIRATORY (INHALATION) at 10:27

## 2018-09-03 RX ADMIN — Medication 10 ML: at 09:55

## 2018-09-03 RX ADMIN — CEFEPIME 1 G: 1 INJECTION, POWDER, FOR SOLUTION INTRAMUSCULAR; INTRAVENOUS at 22:42

## 2018-09-03 RX ADMIN — Medication 4 PUFF: at 10:27

## 2018-09-03 ASSESSMENT — PAIN SCALES - GENERAL
PAINLEVEL_OUTOF10: 0
PAINLEVEL_OUTOF10: 0
PAINLEVEL_OUTOF10: 1
PAINLEVEL_OUTOF10: 0

## 2018-09-03 ASSESSMENT — PULMONARY FUNCTION TESTS
PIF_VALUE: 17
PIF_VALUE: 13

## 2018-09-03 NOTE — PROGRESS NOTES
Hospitalist Progress Note      PCP: César Vivas MD    Date of Admission: 8/31/2018    Chief Complaint:  Started the taking care of this patient this morning. The patient is intubated and prevented that. Systolic blood pressure is 100. She is off the Levophed drip. Tolerating her tube feeds. No hematemesis or melena according to the nurse. No other reported symptoms. Subjective: Patient is unresponsive still on sedation and therefore her subjective exam is inconclusive. No other reported signs or symptoms per nursing staff.         Medications:  Reviewed    Infusion Medications    propofol 10 mcg/kg/min (09/02/18 0810)    sodium chloride 100 mL/hr at 09/03/18 0528    dextrose      norepinephrine 2 mcg/min (09/03/18 1231)     Scheduled Medications    0.9 % sodium chloride  250 mL Intravenous Once    cefepime  1 g Intravenous Q8H    pantoprazole  40 mg Intravenous BID    And    sodium chloride (PF)  10 mL Intravenous Daily    insulin lispro  0-6 Units Subcutaneous 4 times per day    calcium elemental  500 mg Oral Daily    ferrous sulfate  325 mg Oral Daily with breakfast    folic acid  1 mg Oral Daily    memantine  5 mg Oral Daily    sodium chloride flush  10 mL Intravenous 2 times per day    clindamycin (CLEOCIN) IV  600 mg Intravenous Q8H    sodium chloride  1,000 mL Intravenous Once    albuterol sulfate HFA  4 puff Inhalation 4x daily    And    ipratropium  4 puff Inhalation 4x daily     PRN Meds: acetaminophen, sodium chloride flush, magnesium hydroxide, ondansetron, glucose, dextrose, glucagon (rDNA), dextrose      Intake/Output Summary (Last 24 hours) at 09/03/18 0756  Last data filed at 09/03/18 0528   Gross per 24 hour   Intake           3783.7 ml   Output              650 ml   Net           3133.7 ml       Exam:    /69   Pulse 76   Temp 97.9 °F (36.6 °C) (Rectal)   Resp 16   Ht 5' 4\" (1.626 m)   Wt 181 lb 10.5 oz (82.4 kg)   SpO2 98%   BMI 31.18 kg/m²     General MODERATE 08/31/2018    SPECGRAV 1.016 08/31/2018    GLUCOSEU Negative 08/31/2018       Radiology:  XR CHEST PORTABLE   Final Result      Left lung base opacity compatible with infiltrate. Linear right lung base opacity likely on the basis of atelectasis and/or infiltrate. XR CHEST PORTABLE   Final Result      Interval placement of endotracheal tube, terminating at the level of the clavicles. Enteric tube is present and abruptly turns proximal diaphragm likely due to the patient's hiatal hernia. Subsegmental atelectasis versus small infiltrate in both lung bases. XR CHEST PORTABLE   Final Result    IMPRESSION:       No acute intrathoracic process. XR CHEST PORTABLE   Final Result   1. DISTAL TIP OF RIGHT CENTRAL VENOUS LINE IS IN RIGHT SIDE OF THE NECK AND RIGHT CVC SHOULD BE REPOSITIONED. 2. NO EVIDENCE OF A PNEUMOTHORAX POST RIGHT CVC LINE PLACEMENT. 3. SUSPECT SUBSEGMENTAL ATELECTASIS OR PERHAPS DEVELOPING SMALL INFILTRATE IN LEFT LUNG BASE. 4. BORDERLINE CARDIOMEGALY AND ATHEROSCLEROTIC TORTUOUS AORTA. CT Head WO Contrast   Final Result   1. CEREBRAL ATROPHY AND AGE RELATED FINDINGS IN THE BRAIN. 2. NO ACUTE INTRA-AXIAL OR EXTRA-AXIAL FINDINGS IN THE BRAIN. 3. NO SIGNIFICANT CHANGE SINCE 5/14/2018 CT BRAIN. All CT scans at this facility use dose modulation, iterative reconstruction, and/or weight based dosing when appropriate to reduce radiation dose to as low as reasonably achievable. XR CHEST PORTABLE    (Results Pending)           Assessment/Plan:    Active Hospital Problems    Diagnosis Date Noted    UTI (urinary tract infection) [N39.0] 09/01/2018     Sepsis, secondary to UTI secondary to catheter and used a UTI. Probable pneumonia. Respiratory failure, intubated and vented. Thrombocytopenia, probably secondary to sepsis, less likely HIT. Hypotension secondary to a sepsis and septic shock. Anemia, drop of hemoglobin from 11-7.   No evidence of

## 2018-09-03 NOTE — PROGRESS NOTES
56*   --    --    --     < > = values in this interval not displayed. Recent Labs      08/31/18   2315   09/02/18   0409  09/02/18   0532  09/03/18   0539   NA  146*   --   146*   --    --    K  4.8   --   5.0   --    --    CL  109*   --   113*   --    --    CO2  22   --   17*   --    --    BUN  16   --   20   --    --    CREATININE  1.19*   < >  1.91*  1.9*  1.3*   GLUCOSE  91   --   186*   --    --    CALCIUM  9.6   --   8.1*   --    --    PROT  7.1   --   5.8*   --    --    LABALBU  3.0*   --   2.1*   --    --    BILITOT  0.3   --   <0.2   --    --    ALKPHOS  140*   --   99   --    --    AST  46*   --   16   --    --    ALT  31   --   16   --    --    LABGLOM  44.3*   < >  25.6*  26*  40*   GFRAA  53.6*   < >  31.0*  31*  48*   GLOB  4.1*   --   3.7*   --    --     < > = values in this interval not displayed.        MV Settings:     Vent Mode: CPAP  Vt Ordered: 400 mL  Rate Set: 16 bmp  FiO2 : 30 %  PEEP/CPAP: 5  Pressure Support: 5 cmH20  Peak Inspiratory Pressure: 13 cmH2O  Mean Airway Pressure: 6.9 cmH20  I:E Ratio: 1:3.20    Recent Labs      09/03/18   1133   PHART  7.349*   [id number]  40   PO2ART  103*   PSH1MBM  21.9   BEART  -4*   [id number]  98*       O2 Device: Ventilator  O2 Flow Rate (L/min): 2 L/min    DIET TUBE FEED CONTINUOUS/CYCLIC NPO; Low Calorie High Protein; Orogastric; Continuous; 15; 45; 24     MEDICATIONS during current hospitalization:    Continuous Infusions:   propofol Stopped (09/03/18 0800)    sodium chloride 100 mL/hr at 09/03/18 0528    dextrose      norepinephrine Stopped (09/03/18 0910)       Scheduled Meds:   pantoprazole  40 mg Intravenous BID    And    sodium chloride (PF)  10 mL Intravenous BID    cefepime  1 g Intravenous Q12H    ipratropium-albuterol  1 ampule Inhalation 4x daily    insulin lispro  0-6 Units Subcutaneous 4 times per day    calcium elemental  500 mg Oral Daily    ferrous sulfate  325 mg Oral Daily with breakfast    folic acid  1 mg Oral Daily    chest radiograph History: Aspiration pneumonia Technique: AP portable view of the chest obtained. Comparison: Chest radiograph from September 1, 2013 00 hours Findings: Endotracheal tube and enteric tube are in satisfactory position. Atherosclerotic calcification of the thoracic aorta. . Mediastinal silhouette is within normal limits. Left lung base opacities compatible with infiltrate. Linear right lung base opacities are present. Retrocardiac lucency compatible with hiatal hernia. No pneumothorax or pleural effusion. Osseous structures are intact. Left lung base opacity compatible with infiltrate. Linear right lung base opacity likely on the basis of atelectasis and/or infiltrate. Xr Chest Portable    Result Date: 9/1/2018  Portable chest radiograph History: Intubation. Technique: AP portable view of the chest obtained. Comparison: Chest radiograph from September 1, 2018, 1045 hours Findings: Interval placement of an endotracheal tube, its tip terminating at the level of the clavicles. Interval placement of an enteric tube, which abruptly curves just proximal to the diaphragm likely secondary to the patient's hiatal hernia. Atherosclerotic ossification of the thoracic aorta. Continued mediastinal silhouette is within normal limits. Patchy bilateral opacities within each lung base. A pneumothorax or pleural effusion. Interval placement of endotracheal tube, terminating at the level of the clavicles. Enteric tube is present and abruptly turns proximal diaphragm likely due to the patient's hiatal hernia. Subsegmental atelectasis versus small infiltrate in both lung bases. Xr Chest Portable    Result Date: 9/1/2018  Portable chest radiograph History: Shortness of breath Technique: AP portable view of the chest obtained. Comparison: Chest x-ray from August 30, 2018 Findings: Atherosclerotic calcification of the thoracic aorta. Surgical clips project over the cardiac silhouette.  The cardiomediastinal pneumonia, patient is improved Significantly, we will extubate and observe closely in ICU, encourage deep breathing cough, use of flutter valve, bronchodilators  2. Aspiration pneumonia with volume loss in the right lung status post intubation, continue with aggressive antibiotic therapy, Encourage cough and deep breathing  3. UTI on broad-spectrum antibiotic treatment, culture positive for enterococcus group D   4. Encephalopathy associated with sepsis and respiratory distress in addition to underlying dementia, Continue monitoring off sedation and after extubation  5. Hypernatremia associated with volume loss, dehydration, vomiting, improving, Will recheck tomorrowy  6. Acute kidney injury with underlying chronic kidney disease and mild metabolic acidosis, Will reevaluate with follow-up BMP tomorrow  7. Acute on chronic anemia, receiving transfusion, follow-up hemoglobin was over 9  I spent 34 minutes providing critical care. This time is excluding time spent performing procedures.       Electronically signed by Jelani Guerra MD, FCCP on 9/3/2018 at 4:32 PM

## 2018-09-03 NOTE — PROGRESS NOTES
2000- Initial assessment complete. Remains on vent sedated with propofol. Tolerating tube feeding. mepelex to coccyx area. 0200- complete bath and bed change done. Wound noted to right heel.- documented in LDA.   0400- Weaning down levophed as able. 0500- portable CxR done.

## 2018-09-04 ENCOUNTER — APPOINTMENT (OUTPATIENT)
Dept: GENERAL RADIOLOGY | Age: 73
DRG: 466 | End: 2018-09-04
Payer: MEDICAID

## 2018-09-04 LAB
ALBUMIN SERPL-MCNC: 2.1 G/DL (ref 3.9–4.9)
ALP BLD-CCNC: 74 U/L (ref 40–130)
ALT SERPL-CCNC: 12 U/L (ref 0–33)
ANION GAP SERPL CALCULATED.3IONS-SCNC: 6 MEQ/L (ref 7–13)
ANISOCYTOSIS: ABNORMAL
AST SERPL-CCNC: 16 U/L (ref 0–35)
BASOPHILS ABSOLUTE: 0 K/UL (ref 0–0.2)
BASOPHILS RELATIVE PERCENT: 0.3 %
BILIRUB SERPL-MCNC: 0.3 MG/DL (ref 0–1.2)
BILIRUBIN DIRECT: <0.2 MG/DL (ref 0–0.3)
BILIRUBIN, INDIRECT: ABNORMAL MG/DL (ref 0–0.6)
BUN BLDV-MCNC: 22 MG/DL (ref 8–23)
CALCIUM SERPL-MCNC: 8.4 MG/DL (ref 8.6–10.2)
CHLORIDE BLD-SCNC: 119 MEQ/L (ref 98–107)
CO2: 23 MEQ/L (ref 22–29)
CREAT SERPL-MCNC: 0.96 MG/DL (ref 0.5–0.9)
EOSINOPHILS ABSOLUTE: 0.2 K/UL (ref 0–0.7)
EOSINOPHILS RELATIVE PERCENT: 1.6 %
GFR AFRICAN AMERICAN: >60
GFR NON-AFRICAN AMERICAN: 56.7
GLUCOSE BLD-MCNC: 129 MG/DL (ref 60–115)
GLUCOSE BLD-MCNC: 131 MG/DL (ref 60–115)
GLUCOSE BLD-MCNC: 154 MG/DL (ref 60–115)
GLUCOSE BLD-MCNC: 189 MG/DL (ref 60–115)
GLUCOSE BLD-MCNC: 88 MG/DL (ref 60–115)
GLUCOSE BLD-MCNC: 93 MG/DL (ref 74–109)
HCT VFR BLD CALC: 28 % (ref 37–47)
HEMATOLOGY PATH CONSULT: NORMAL
HEMOGLOBIN: 9.3 G/DL (ref 12–16)
LYMPHOCYTES ABSOLUTE: 0.9 K/UL (ref 1–4.8)
LYMPHOCYTES RELATIVE PERCENT: 9.3 %
MAGNESIUM: 1.6 MG/DL (ref 1.7–2.3)
MCH RBC QN AUTO: 26.6 PG (ref 27–31.3)
MCHC RBC AUTO-ENTMCNC: 33.2 % (ref 33–37)
MCV RBC AUTO: 80.1 FL (ref 82–100)
MICROCYTES: ABNORMAL
MONOCYTES ABSOLUTE: 0.3 K/UL (ref 0.2–0.8)
MONOCYTES RELATIVE PERCENT: 3.2 %
NEUTROPHILS ABSOLUTE: 8.2 K/UL (ref 1.4–6.5)
NEUTROPHILS RELATIVE PERCENT: 85.6 %
ORGANISM: ABNORMAL
ORGANISM: ABNORMAL
PDW BLD-RTO: 20.5 % (ref 11.5–14.5)
PERFORMED ON: ABNORMAL
PERFORMED ON: NORMAL
PHOSPHORUS: 2.7 MG/DL (ref 2.5–4.5)
PLATELET # BLD: 47 K/UL (ref 130–400)
PLATELET SLIDE REVIEW: ABNORMAL
POIKILOCYTES: ABNORMAL
POTASSIUM SERPL-SCNC: 3.5 MEQ/L (ref 3.5–5.1)
RBC # BLD: 3.49 M/UL (ref 4.2–5.4)
SLIDE REVIEW: ABNORMAL
SODIUM BLD-SCNC: 148 MEQ/L (ref 132–144)
TOTAL PROTEIN: 5.3 G/DL (ref 6.4–8.1)
URINE CULTURE, ROUTINE: ABNORMAL
WBC # BLD: 9.6 K/UL (ref 4.8–10.8)

## 2018-09-04 PROCEDURE — 2000000000 HC ICU R&B

## 2018-09-04 PROCEDURE — 94640 AIRWAY INHALATION TREATMENT: CPT

## 2018-09-04 PROCEDURE — 6370000000 HC RX 637 (ALT 250 FOR IP): Performed by: INTERNAL MEDICINE

## 2018-09-04 PROCEDURE — 6360000002 HC RX W HCPCS: Performed by: INTERNAL MEDICINE

## 2018-09-04 PROCEDURE — 99232 SBSQ HOSP IP/OBS MODERATE 35: CPT | Performed by: INTERNAL MEDICINE

## 2018-09-04 PROCEDURE — 93010 ELECTROCARDIOGRAM REPORT: CPT | Performed by: INTERNAL MEDICINE

## 2018-09-04 PROCEDURE — 83735 ASSAY OF MAGNESIUM: CPT

## 2018-09-04 PROCEDURE — 71045 X-RAY EXAM CHEST 1 VIEW: CPT

## 2018-09-04 PROCEDURE — 6370000000 HC RX 637 (ALT 250 FOR IP): Performed by: NURSE PRACTITIONER

## 2018-09-04 PROCEDURE — 2700000000 HC OXYGEN THERAPY PER DAY

## 2018-09-04 PROCEDURE — 99233 SBSQ HOSP IP/OBS HIGH 50: CPT | Performed by: INTERNAL MEDICINE

## 2018-09-04 PROCEDURE — 84100 ASSAY OF PHOSPHORUS: CPT

## 2018-09-04 PROCEDURE — C9113 INJ PANTOPRAZOLE SODIUM, VIA: HCPCS | Performed by: INTERNAL MEDICINE

## 2018-09-04 PROCEDURE — 80048 BASIC METABOLIC PNL TOTAL CA: CPT

## 2018-09-04 PROCEDURE — 94760 N-INVAS EAR/PLS OXIMETRY 1: CPT

## 2018-09-04 PROCEDURE — 80076 HEPATIC FUNCTION PANEL: CPT

## 2018-09-04 PROCEDURE — 2580000003 HC RX 258: Performed by: NURSE PRACTITIONER

## 2018-09-04 PROCEDURE — 2500000003 HC RX 250 WO HCPCS: Performed by: INTERNAL MEDICINE

## 2018-09-04 PROCEDURE — 2580000003 HC RX 258: Performed by: INTERNAL MEDICINE

## 2018-09-04 PROCEDURE — 51702 INSERT TEMP BLADDER CATH: CPT

## 2018-09-04 PROCEDURE — 85025 COMPLETE CBC W/AUTO DIFF WBC: CPT

## 2018-09-04 RX ORDER — BISACODYL 10 MG
10 SUPPOSITORY, RECTAL RECTAL ONCE
Status: COMPLETED | OUTPATIENT
Start: 2018-09-04 | End: 2018-09-04

## 2018-09-04 RX ORDER — SODIUM CHLORIDE AND POTASSIUM CHLORIDE .9; .15 G/100ML; G/100ML
SOLUTION INTRAVENOUS CONTINUOUS
Status: DISCONTINUED | OUTPATIENT
Start: 2018-09-04 | End: 2018-09-04

## 2018-09-04 RX ORDER — SENNOSIDES 8.8 MG/5ML
5 LIQUID ORAL 2 TIMES DAILY PRN
Status: DISCONTINUED | OUTPATIENT
Start: 2018-09-04 | End: 2018-09-18 | Stop reason: HOSPADM

## 2018-09-04 RX ORDER — FUROSEMIDE 10 MG/ML
40 INJECTION INTRAMUSCULAR; INTRAVENOUS ONCE
Status: COMPLETED | OUTPATIENT
Start: 2018-09-04 | End: 2018-09-04

## 2018-09-04 RX ORDER — POTASSIUM CHLORIDE 1.5 G/1.77G
20 POWDER, FOR SOLUTION ORAL 2 TIMES DAILY
Status: COMPLETED | OUTPATIENT
Start: 2018-09-04 | End: 2018-09-04

## 2018-09-04 RX ORDER — MAGNESIUM SULFATE IN WATER 40 MG/ML
2 INJECTION, SOLUTION INTRAVENOUS ONCE
Status: COMPLETED | OUTPATIENT
Start: 2018-09-04 | End: 2018-09-04

## 2018-09-04 RX ADMIN — BISACODYL 10 MG: 10 SUPPOSITORY RECTAL at 10:37

## 2018-09-04 RX ADMIN — MEMANTINE HYDROCHLORIDE 5 MG: 5 TABLET ORAL at 08:28

## 2018-09-04 RX ADMIN — PANTOPRAZOLE SODIUM 40 MG: 40 INJECTION, POWDER, FOR SOLUTION INTRAVENOUS at 20:04

## 2018-09-04 RX ADMIN — PANTOPRAZOLE SODIUM 40 MG: 40 INJECTION, POWDER, FOR SOLUTION INTRAVENOUS at 08:29

## 2018-09-04 RX ADMIN — IPRATROPIUM BROMIDE AND ALBUTEROL SULFATE 1 AMPULE: .5; 3 SOLUTION RESPIRATORY (INHALATION) at 21:52

## 2018-09-04 RX ADMIN — Medication 500 MG: at 08:28

## 2018-09-04 RX ADMIN — CEFEPIME 1 G: 1 INJECTION, POWDER, FOR SOLUTION INTRAMUSCULAR; INTRAVENOUS at 08:28

## 2018-09-04 RX ADMIN — IPRATROPIUM BROMIDE AND ALBUTEROL SULFATE 1 AMPULE: .5; 3 SOLUTION RESPIRATORY (INHALATION) at 11:37

## 2018-09-04 RX ADMIN — FOLIC ACID 1 MG: 1 TABLET ORAL at 08:28

## 2018-09-04 RX ADMIN — Medication 10 ML: at 08:41

## 2018-09-04 RX ADMIN — POTASSIUM CHLORIDE 20 MEQ: 1.5 POWDER, FOR SOLUTION ORAL at 23:05

## 2018-09-04 RX ADMIN — POTASSIUM CHLORIDE AND SODIUM CHLORIDE: 900; 150 INJECTION, SOLUTION INTRAVENOUS at 08:28

## 2018-09-04 RX ADMIN — GUAIFENESIN 200 MG: 200 SOLUTION ORAL at 08:29

## 2018-09-04 RX ADMIN — POTASSIUM CHLORIDE 20 MEQ: 1.5 POWDER, FOR SOLUTION ORAL at 10:38

## 2018-09-04 RX ADMIN — INSULIN LISPRO 1 UNITS: 100 INJECTION, SOLUTION INTRAVENOUS; SUBCUTANEOUS at 12:57

## 2018-09-04 RX ADMIN — FUROSEMIDE 40 MG: 10 INJECTION, SOLUTION INTRAMUSCULAR; INTRAVENOUS at 10:36

## 2018-09-04 RX ADMIN — INSULIN LISPRO 1 UNITS: 100 INJECTION, SOLUTION INTRAVENOUS; SUBCUTANEOUS at 17:33

## 2018-09-04 RX ADMIN — IPRATROPIUM BROMIDE AND ALBUTEROL SULFATE 1 AMPULE: .5; 3 SOLUTION RESPIRATORY (INHALATION) at 07:50

## 2018-09-04 RX ADMIN — FERROUS SULFATE TAB 325 MG (65 MG ELEMENTAL FE) 325 MG: 325 (65 FE) TAB at 08:28

## 2018-09-04 RX ADMIN — Medication 10 ML: at 08:42

## 2018-09-04 RX ADMIN — DOCUSATE SODIUM 100 MG: 50 LIQUID ORAL at 20:04

## 2018-09-04 RX ADMIN — DOCUSATE SODIUM 100 MG: 50 LIQUID ORAL at 10:36

## 2018-09-04 RX ADMIN — Medication 10 ML: at 20:04

## 2018-09-04 RX ADMIN — IPRATROPIUM BROMIDE AND ALBUTEROL SULFATE 1 AMPULE: .5; 3 SOLUTION RESPIRATORY (INHALATION) at 15:12

## 2018-09-04 RX ADMIN — CEFEPIME 1 G: 1 INJECTION, POWDER, FOR SOLUTION INTRAMUSCULAR; INTRAVENOUS at 20:04

## 2018-09-04 RX ADMIN — IPRATROPIUM BROMIDE AND ALBUTEROL SULFATE 1 AMPULE: .5; 3 SOLUTION RESPIRATORY (INHALATION) at 05:18

## 2018-09-04 RX ADMIN — MAGNESIUM SULFATE HEPTAHYDRATE 2 G: 40 INJECTION, SOLUTION INTRAVENOUS at 08:54

## 2018-09-04 ASSESSMENT — PAIN SCALES - PAIN ASSESSMENT IN ADVANCED DEMENTIA (PAINAD)
CONSOLABILITY: 0
BODYLANGUAGE: 0
CONSOLABILITY: 0
NEGVOCALIZATION: 0
BREATHING: 0
FACIALEXPRESSION: 0
TOTALSCORE: 0
FACIALEXPRESSION: 0
TOTALSCORE: 0
BODYLANGUAGE: 0
CONSOLABILITY: 0
BODYLANGUAGE: 0
NEGVOCALIZATION: 0
FACIALEXPRESSION: 0
FACIALEXPRESSION: 0
BREATHING: 0
TOTALSCORE: 0
BREATHING: 0
NEGVOCALIZATION: 0
BREATHING: 0
BODYLANGUAGE: 0
BREATHING: 0
TOTALSCORE: 0
BREATHING: 0
NEGVOCALIZATION: 0
TOTALSCORE: 0
TOTALSCORE: 0
BREATHING: 0
FACIALEXPRESSION: 0
TOTALSCORE: 0
FACIALEXPRESSION: 0
CONSOLABILITY: 0
BODYLANGUAGE: 0
BREATHING: 0
BODYLANGUAGE: 0
CONSOLABILITY: 0
BODYLANGUAGE: 0
FACIALEXPRESSION: 0
NEGVOCALIZATION: 0
FACIALEXPRESSION: 0
CONSOLABILITY: 0
BREATHING: 0
NEGVOCALIZATION: 0
CONSOLABILITY: 0
BREATHING: 0
TOTALSCORE: 0
BODYLANGUAGE: 0
BREATHING: 0
TOTALSCORE: 0
BODYLANGUAGE: 0
CONSOLABILITY: 0
FACIALEXPRESSION: 0
BODYLANGUAGE: 0
FACIALEXPRESSION: 0
TOTALSCORE: 0
TOTALSCORE: 0
BREATHING: 0
NEGVOCALIZATION: 0
CONSOLABILITY: 0
BREATHING: 0
FACIALEXPRESSION: 0
BREATHING: 0
NEGVOCALIZATION: 0
CONSOLABILITY: 0
FACIALEXPRESSION: 0
FACIALEXPRESSION: 0
CONSOLABILITY: 0
NEGVOCALIZATION: 0
NEGVOCALIZATION: 0
FACIALEXPRESSION: 0
FACIALEXPRESSION: 0
BREATHING: 0
CONSOLABILITY: 0
NEGVOCALIZATION: 0
CONSOLABILITY: 0
NEGVOCALIZATION: 0
FACIALEXPRESSION: 0
TOTALSCORE: 0
NEGVOCALIZATION: 0
TOTALSCORE: 0
BODYLANGUAGE: 0
NEGVOCALIZATION: 0
BREATHING: 0
BODYLANGUAGE: 0
CONSOLABILITY: 0
NEGVOCALIZATION: 0
BODYLANGUAGE: 0
CONSOLABILITY: 0
TOTALSCORE: 0
FACIALEXPRESSION: 0
NEGVOCALIZATION: 0
NEGVOCALIZATION: 0
CONSOLABILITY: 0
BODYLANGUAGE: 0
TOTALSCORE: 0
TOTALSCORE: 0
BODYLANGUAGE: 0
BREATHING: 0
NEGVOCALIZATION: 0
FACIALEXPRESSION: 0
BODYLANGUAGE: 0
BREATHING: 0

## 2018-09-04 ASSESSMENT — PAIN SCALES - GENERAL
PAINLEVEL_OUTOF10: 0
PAINLEVEL_OUTOF10: 0

## 2018-09-04 NOTE — PROGRESS NOTES
Nutrition Assessment    Type and Reason for Visit: Reassess    Nutrition Recommendations:     MODIFY CURRENT TUBE FEEDINGS TO:    DIABETIC- 45 ML/HR- To GOAL RATE of 55 ML/HR,  With 50 ml Water Flush Every 6 Hours. This will  provide 1584 Kcals, 79 Grams -  And ~ 1270 ml free water. Malnutrition Assessment:  · Malnutrition Status: Insufficient data (Probable Malnutrtion if Weigh history is accurate.)  · Context: Acute illness or injury  · Findings of the 6 clinical characteristics of malnutrition (Minimum of 2 out of 6 clinical characteristics is required to make the diagnosis of moderate or severe Protein Calorie Malnutrition based on AND/ASPEN Guidelines):  1. Energy Intake-Not available, not able to assess    2. Weight Loss-20% loss or greater, in 3 months  3. Fat Loss-Unable to assess,    4. Muscle Loss-Unable to assess,    5. Fluid Accumulation-Mild fluid accumulation, Extremities  6.  Strength-Not measured    Nutrition Diagnosis:   · Problem: Inadequate oral intake  · Etiology: related to Impaired respiratory function-inability to consume food     Signs and symptoms:  as evidenced by NPO status due to medical condition    Nutrition Assessment:  · Subjective Assessment: Patient extubated but too lethargic for swallow evaluation. Last admissin- 7/2018- pureed with [place] Thick Liquids. NGT in place. · Nutrition-Focused Physical Findings: Generalized to Trace Edema. NGT. 9/3- I/- 3532  ml O/- 1450 ml. CVC Line. Na- 148- 9/4/18. No BM recorded since admission. Na- 148- 9/4.   · Wound Type: Stage II (Sacrum per RN- 9/1)  · Current Nutrition Therapies:  · Oral Diet Orders: NPO   · Oral Diet intake: NPO  · Oral Nutrition Supplement (ONS) Orders: None  · ONS intake: NPO  · Tube Feeding (TF) Orders:   · Feeding Route: Nasogastric  · Formula: Low Calorie, High Protein  · Rate (ml/hr):45 ML/HR    · Volume (ml/day): 1080 ML  · Duration: Continuous 24hrs  · Additives/Modulars:  (nONE)  · TF Residuals:

## 2018-09-04 NOTE — PLAN OF CARE
Problem: Nutrition  Goal: Optimal nutrition therapy  Nutrition Problem: Inadequate oral intake  Intervention: Food and/or Nutrient Delivery: Modify current Tube Feeding  Nutritional Goals: EN To Provide > 85% of Esimated Nutrient Needs. Na- Wnl. BG < 160. + BM.    Outcome: Ongoing

## 2018-09-04 NOTE — PROGRESS NOTES
CRITICAL CARE PROGRESS NOTES    PATIENT NAME: Susan Bruno  MRN: [id number]  SERVICE DATE:  September 4, 2018   SERVICE TIME:  10:59 AM      PRIMARY SERVICE: Critical care medicine    CHIEF COMPLAINTS: Nonverbal    INTERVAL HPI: Patient seen and examined at bedside, Interval Notes, orders reviewed. Discussed on multidisciplinary rounds  Patient is extubated, tolerating spontaneous breathing fairly well but with worsening oxygenation this morning, suddenly, suggesting mucous plugging. She has upper airway coarse audible rhonchi with increased work of breathing specially expiratory phase      OBJECTIVE    Body mass index is 31.18 kg/m². PHYSICAL EXAM:  Vitals:  BP (!) 110/41   Pulse 93   Temp 96.3 °F (35.7 °C) (Core)   Resp 22   Ht 5' 4\" (1.626 m)   Wt 181 lb 10.5 oz (82.4 kg)   SpO2 100%   BMI 31.18 kg/m²   General: Patient is  somewhat lethargic but arousable easily shakes her head yes or no to simple questions but not consistently. She is nonverbal she has a facemask at 50% on now  Head: Atraumatic , Normocephalic   Eyes: PERRL. No icteric sclera. No conjunctival injection. No discharge   ENT: No nasal  discharge. Pharynx clear. Neck:  Trachea midline. No thyromegaly, no JVD, No cervical adenopathy. Chest : Increased breathing effort, symmetric bilateral excursions  Lung : Diminished breath sounds bilaterally  Heart[de-identified] Regular rhythm and rate. No mumur ,  Rub or gallop  ABD: Benign. Non-tender. Non-distended. No masses or organmegaly. Normal bowel sounds. EXT: Significant Pitting edema both upper and lower extremities , No Cyanosis No clubbing  Neuro: no focal weakness  Skin: Warm and dry. No erythema or rash on exposed extremities.       DATA:   Recent Labs      09/02/18   0410   09/03/18   1729  09/04/18   0501   WBC  26.0*   --    --   9.6   HGB  11.1*   < >  9.0*  9.3*   HCT  34.7*   < >  27.5*  28.0*   MCV  80.3*   --    --   80.1*   PLT  56*   --    --   47*    < > = values in this interval ondansetron, glucose, dextrose, glucagon (rDNA), dextrose    Radiology  Ct Head Wo Contrast    Result Date: 9/1/2018  EXAM: CT SCAN OF THE BRAIN WITHOUT CONTRAST COMPARISON: 5/14/2018 REASONS FOR EXAMINATION:     DIABETES WITH ALTERED MENTAL STATUS TECHNIQUE:  CT brain is obtained without IV contrast agent. FINDINGS: An unenhanced CT scan of the brain demonstrates no evidence of a skull fracture. There is cerebral atrophy and age related findings in the brain. Mild patchy white matter hypoattenuation is likely a sequela of small vessel disease. There is no acute CVA. There is no acute intra-axial or extra-axial findings in the brain. There is no intracranial mass, hemorrhage, \"mass effect\" or midline shift. The remainder of the CT scan of the brain appears unremarkable. No significant change since the 5/14/2018 CT brain. 1. CEREBRAL ATROPHY AND AGE RELATED FINDINGS IN THE BRAIN. 2. NO ACUTE INTRA-AXIAL OR EXTRA-AXIAL FINDINGS IN THE BRAIN. 3. NO SIGNIFICANT CHANGE SINCE 5/14/2018 CT BRAIN. All CT scans at this facility use dose modulation, iterative reconstruction, and/or weight based dosing when appropriate to reduce radiation dose to as low as reasonably achievable. Xr Chest Portable    Result Date: 9/3/2018  EXAMINATION: XR CHEST, PORTABLE SINGLE VIEW: DATE AND TIME: 9/3/2018 at 6:00 AM. CLINICAL HISTORY: SHORTNESS OF BREATH. FOLLOW-UP ASPIRATION PNEUMONIA. COMPARISON: September 2, 2018. FINDINGS: ET tube and NG tube remain in satisfactory position. Minimal patchy opacities at the lung bases. No consolidation. No new infiltrates. STABLE CHEST. NO SIGNIFICANT CHANGE. Xr Chest Portable    Result Date: 9/2/2018  Portable chest radiograph History: Aspiration pneumonia Technique: AP portable view of the chest obtained. Comparison: Chest radiograph from September 1, 2013 00 hours Findings: Endotracheal tube and enteric tube are in satisfactory position.  Atherosclerotic calcification of the 9/1/2018 AT 10:45 HOURS CLINICAL HISTORY: CHECK RIGHT CENTRAL VENOUS LINE PLACEMENT COMPARISONS: 8/31/2018 FINDINGS: There is borderline cardiomegaly. There is atherosclerotic tortuous aorta. The distal tip of the right central venous catheter is extending into the right side of the neck and presumably within the right internal jugular vein and the right CVC should be repositioned. There is no pneumothorax. There is subsegmental atelectasis or developing small streaky infiltrate in the left lung base. Rounded lucency superimposed on the left cardiac silhouette may very well represent a hiatal hernia. There is degenerative bone spurring throughout the spine. 1. DISTAL TIP OF RIGHT CENTRAL VENOUS LINE IS IN RIGHT SIDE OF THE NECK AND RIGHT CVC SHOULD BE REPOSITIONED. 2. NO EVIDENCE OF A PNEUMOTHORAX POST RIGHT CVC LINE PLACEMENT. 3. SUSPECT SUBSEGMENTAL ATELECTASIS OR PERHAPS DEVELOPING SMALL INFILTRATE IN LEFT LUNG BASE. 4. BORDERLINE CARDIOMEGALY AND ATHEROSCLEROTIC TORTUOUS AORTA. Xr Chest Portable    Result Date: 8/30/2018  EXAMINATION: CHEST PORTABLE VIEW  CLINICAL HISTORY: Short of breath COMPARISONS: July 22, 2018  FINDINGS: Single  views of the chest is submitted. Metallic density again overlying the cardiac silhouette. Unchanged The cardiac silhouette is of normal size configuration. The mediastinum is unremarkable. Pulmonary vascular unremarkable. Right sided trachea. No focal infiltrates. No Pneumothoraces. NO ACUTE ACTIVE CARDIOPULMONARY PROCESS            IMPRESSION AND SUGGESTION:  1. Acute hypoxemic respiratory failure secondary to aspiration pneumonia, patient was extubated yesterday, did fairly well initially today with some increased respiratory issues this morning, likely and able to mobilize airway secretions since extubation.   Continue aggressive pulmonary toileting as tolerated, NT suctioning, continue to

## 2018-09-04 NOTE — PROGRESS NOTES
Physical Therapy   Facility/Department: [facility] MED SURG IC05/IC05-01    NAME: Dangelo Frazier    : 1945 (79 y.o.)  MRN: [id number]    Account: [de-identified]  Gender: female    PT evaluation and treatment orders received. Chart reviewed. PT eval attempted. Pt unable to be aroused with verbal attempts or with sternal rub. Sleeping heavily, unable to attempt PT eval.    Will follow and attempt PT evaluation again at earliest convenience.       Electronically signed by Caroline Meneses PT on 18 at 2:09 PM

## 2018-09-04 NOTE — PROGRESS NOTES
6422: per daughter, pt was on pureed with [place] thick liquids. Speech is on consult, will notify upon arrival to unit. 0740: pt saturations dropped to 86 -88% on 4l nc, placed on 50% venti mask,  perfectserve  Dr. Yumiko Rice. Waiting for new orders. 2613:  Micro called to inform of VRE in urine. notified Dr. Beverley Conte and perfectserved Dr. Alma Ewing. Place pt in isolation with proper signs. Placed on contact in computer as well.       1914: tf to 55, residual 5cc of glucerna 1.2, changed alfaro catheter with cameron BARBA

## 2018-09-04 NOTE — PROGRESS NOTES
Hospitalist Progress Note      PCP: Donya Hicks MD    Date of Admission: 8/31/2018    Chief Complaint:  Patient is extubated. She is awake but not fully alert. She is able to follow simple commands such as a squeezing the hands and that is the extent of her cooperation. She still has the NG tube and went to breast-feed. Her blood pressure systolically between . Questionable swallowing difficulties. No other reported issues by nursing staff. Subjective: : Patient is unable to provide that information at this moment. Medications:  Reviewed    Infusion Medications    0.9% NaCl with KCl 20 mEq      dextrose       Scheduled Medications    magnesium sulfate  2 g Intravenous Once    pantoprazole  40 mg Intravenous BID    And    sodium chloride (PF)  10 mL Intravenous BID    cefepime  1 g Intravenous Q12H    ipratropium-albuterol  1 ampule Inhalation 4x daily    insulin lispro  0-6 Units Subcutaneous 4 times per day    calcium elemental  500 mg Oral Daily    ferrous sulfate  325 mg Oral Daily with breakfast    folic acid  1 mg Oral Daily    memantine  5 mg Oral Daily    sodium chloride flush  10 mL Intravenous 2 times per day    sodium chloride  1,000 mL Intravenous Once     PRN Meds: guaiFENesin, acetaminophen, sodium chloride flush, magnesium hydroxide, ondansetron, glucose, dextrose, glucagon (rDNA), dextrose      Intake/Output Summary (Last 24 hours) at 09/04/18 0732  Last data filed at 09/04/18 0600   Gross per 24 hour   Intake             3532 ml   Output             1450 ml   Net             2082 ml       Exam:    BP (!) 127/40   Pulse 86   Temp 96.3 °F (35.7 °C) (Core)   Resp 20   Ht 5' 4\" (1.626 m)   Wt 181 lb 10.5 oz (82.4 kg)   SpO2 100%   BMI 31.18 kg/m²     General appearance: Extubated, awake but not alert. Able to follow simple commands but not otherwise. NG tube is in place. Nasal cannula as well. HEENT: Pupils equal, round, and reactive to light. Conjunctivae/corneas clear. Neck: Supple, with full range of motion. No jugular venous distention. Trachea midline. Respiratory:  Poor respiratory efforts. The abdomen is breath sounds and basilar rhonchi. Cardiovascular: Regular rate and rhythm with normal S1/S2 without murmurs, rubs or gallops. Abdomen: Soft, non-tender, non-distended with normal bowel sounds. Musculoskeletal: No clubbing, cyanosis or edema bilaterally. Full range of motion without deformity. Skin: Skin color, texture, turgor normal.  No rashes or lesions. Neuro: As stated above. Advanced functional disability. The patient is unable to lift up her legs against gravity. .        Labs:   Recent Labs      09/02/18   0410   09/03/18   1200  09/03/18   1729  09/04/18   0501   WBC  26.0*   --    --    --   9.6   HGB  11.1*   < >  9.1*  9.0*  9.3*   HCT  34.7*   --   28.0*  27.5*  28.0*   PLT  56*   --    --    --   47*    < > = values in this interval not displayed. Recent Labs      09/02/18   0409  09/02/18   0532  09/03/18   0539  09/04/18   0501   NA  146*   --    --   148*   K  5.0   --    --   3.5   CL  113*   --    --   119*   CO2  17*   --    --   23   BUN  20   --    --   22   CREATININE  1.91*  1.9*  1.3*  0.96*   CALCIUM  8.1*   --    --   8.4*   PHOS   --    --    --   2.7     Recent Labs      09/02/18   0409  09/04/18   0501   AST  16  16   ALT  16  12   BILIDIR   --   <0.2   BILITOT  <0.2  0.3   ALKPHOS  99  74     No results for input(s): INR in the last 72 hours. No results for input(s): Lenora Cid in the last 72 hours. Urinalysis:    Lab Results   Component Value Date    NITRU Negative 08/31/2018    WBCUA 10-20 08/31/2018    BACTERIA Few 08/31/2018    RBCUA 5-10 08/31/2018    BLOODU MODERATE 08/31/2018    SPECGRAV 1.016 08/31/2018    GLUCOSEU Negative 08/31/2018       Radiology:  XR CHEST PORTABLE   Final Result   STABLE CHEST. NO SIGNIFICANT CHANGE.                   XR CHEST PORTABLE   Final Result      Left

## 2018-09-04 NOTE — PROGRESS NOTES
Gram-negative sepsis Citrobacter and E. coli     History of Present Illness:  HTN, DMII, SSS and  Alzheimer dementia  from Nursing home   Lethargy and emesis for two days   arrived  hypothermic   was in the ER 8/30 and treated for UTI      Returning to the nursing home is question of aspiration           Lab - Abbreviation Name Director Address Valid Date Range   [id number] [address] [facility] Randall Fatima MD [address] [place] [address] 07/12/18 1359-Present   Narrative      ORDER#: [id number]                          ORDERED BY: Anya Sanchez  SOURCE: Urine Clean Catch                  COLLECTED:  08/30/18 09:30  ANTIBIOTICS AT [facility]IK. :                      RECEIVED :  08/30/18 10:30   Culture & Susceptibility      CITROBACTER KOSERI      Antibiotic Interpretation [facility] Unit   amoxicillin-clavulanate Resistant 4 mcg/mL   ceFAZolin Sensitive <=4 mcg/mL   cefepime Sensitive <=1 mcg/mL   ciprofloxacin Sensitive <=0.25 mcg/mL   gentamicin Sensitive <=1 mcg/mL   imipenem Sensitive <=0.25 mcg/mL   nitrofurantoin Sensitive 32 mcg/mL   trimethoprim-sulfamethoxazole Sensitive <=20 mcg/mL          ESCHERICHIA COLI      Antibiotic Interpretation [facility] Unit   ampicillin Sensitive <=2 mcg/mL   ceFAZolin Sensitive <=4 mcg/mL   ciprofloxacin Sensitive <=0.25 mcg/mL   gentamicin Sensitive <=1 mcg/mL   nitrofurantoin Sensitive <=16 mcg/mL   trimethoprim-sulfamethoxazole Sensitive <=20 mcg/mL                    Culture & Susceptibility      MORGANELLA MORGANII SSP MORGANII      Antibiotic Interpretation [facility] Unit   amoxicillin-clavulanate Resistant >=32 mcg/mL   ampicillin Resistant >=32 mcg/mL   ceFAZolin Resistant >=64 mcg/mL   cefTRIAXone Sensitive <=1 mcg/mL   ciprofloxacin Intermediate 2 mcg/mL   gentamicin Resistant >=16 mcg/mL   levofloxacin Intermediate 4 mcg/mL   nitrofurantoin Resistant 256 mcg/mL   tobramycin Sensitive <=1 mcg/mL   trimethoprim-sulfamethoxazole Resistant >=320 mcg/mL

## 2018-09-05 ENCOUNTER — APPOINTMENT (OUTPATIENT)
Dept: MRI IMAGING | Age: 73
DRG: 466 | End: 2018-09-05
Payer: MEDICAID

## 2018-09-05 LAB
ANION GAP SERPL CALCULATED.3IONS-SCNC: 10 MEQ/L (ref 7–13)
BUN BLDV-MCNC: 20 MG/DL (ref 8–23)
CALCIUM SERPL-MCNC: 8.9 MG/DL (ref 8.6–10.2)
CHLORIDE BLD-SCNC: 115 MEQ/L (ref 98–107)
CO2: 24 MEQ/L (ref 22–29)
CREAT SERPL-MCNC: 0.73 MG/DL (ref 0.5–0.9)
GFR AFRICAN AMERICAN: >60
GFR NON-AFRICAN AMERICAN: >60
GLUCOSE BLD-MCNC: 112 MG/DL (ref 60–115)
GLUCOSE BLD-MCNC: 136 MG/DL (ref 74–109)
GLUCOSE BLD-MCNC: 175 MG/DL (ref 60–115)
HCT VFR BLD CALC: 26.5 % (ref 37–47)
HEMOGLOBIN: 9 G/DL (ref 12–16)
HEPARIN PF4 ANTIBODY: 0.07 OD
MAGNESIUM: 1.9 MG/DL (ref 1.7–2.3)
PERFORMED ON: ABNORMAL
PERFORMED ON: NORMAL
POTASSIUM SERPL-SCNC: 3.7 MEQ/L (ref 3.5–5.1)
REJECTED TEST: NORMAL
SODIUM BLD-SCNC: 149 MEQ/L (ref 132–144)

## 2018-09-05 PROCEDURE — 92610 EVALUATE SWALLOWING FUNCTION: CPT

## 2018-09-05 PROCEDURE — 99232 SBSQ HOSP IP/OBS MODERATE 35: CPT | Performed by: INTERNAL MEDICINE

## 2018-09-05 PROCEDURE — 70551 MRI BRAIN STEM W/O DYE: CPT

## 2018-09-05 PROCEDURE — 2000000000 HC ICU R&B

## 2018-09-05 PROCEDURE — 2580000003 HC RX 258: Performed by: INTERNAL MEDICINE

## 2018-09-05 PROCEDURE — 2580000003 HC RX 258: Performed by: NURSE PRACTITIONER

## 2018-09-05 PROCEDURE — 6370000000 HC RX 637 (ALT 250 FOR IP): Performed by: INTERNAL MEDICINE

## 2018-09-05 PROCEDURE — 6360000002 HC RX W HCPCS: Performed by: INTERNAL MEDICINE

## 2018-09-05 PROCEDURE — G8997 SWALLOW GOAL STATUS: HCPCS

## 2018-09-05 PROCEDURE — 83735 ASSAY OF MAGNESIUM: CPT

## 2018-09-05 PROCEDURE — 85014 HEMATOCRIT: CPT

## 2018-09-05 PROCEDURE — 2700000000 HC OXYGEN THERAPY PER DAY

## 2018-09-05 PROCEDURE — C9113 INJ PANTOPRAZOLE SODIUM, VIA: HCPCS | Performed by: INTERNAL MEDICINE

## 2018-09-05 PROCEDURE — 99233 SBSQ HOSP IP/OBS HIGH 50: CPT | Performed by: INTERNAL MEDICINE

## 2018-09-05 PROCEDURE — 99213 OFFICE O/P EST LOW 20 MIN: CPT

## 2018-09-05 PROCEDURE — 6370000000 HC RX 637 (ALT 250 FOR IP): Performed by: NURSE PRACTITIONER

## 2018-09-05 PROCEDURE — 85018 HEMOGLOBIN: CPT

## 2018-09-05 PROCEDURE — 80048 BASIC METABOLIC PNL TOTAL CA: CPT

## 2018-09-05 PROCEDURE — 94640 AIRWAY INHALATION TREATMENT: CPT

## 2018-09-05 PROCEDURE — G8996 SWALLOW CURRENT STATUS: HCPCS

## 2018-09-05 RX ORDER — PANTOPRAZOLE SODIUM 40 MG/10ML
40 INJECTION, POWDER, LYOPHILIZED, FOR SOLUTION INTRAVENOUS DAILY
Status: DISCONTINUED | OUTPATIENT
Start: 2018-09-06 | End: 2018-09-07

## 2018-09-05 RX ORDER — 0.9 % SODIUM CHLORIDE 0.9 %
10 VIAL (ML) INJECTION 2 TIMES DAILY
Status: DISCONTINUED | OUTPATIENT
Start: 2018-09-05 | End: 2018-09-07

## 2018-09-05 RX ORDER — DEXTROSE MONOHYDRATE 50 MG/ML
INJECTION, SOLUTION INTRAVENOUS CONTINUOUS
Status: DISPENSED | OUTPATIENT
Start: 2018-09-05 | End: 2018-09-05

## 2018-09-05 RX ORDER — FERROUS SULFATE 300 MG/5ML
300 LIQUID (ML) ORAL
Status: DISCONTINUED | OUTPATIENT
Start: 2018-09-05 | End: 2018-09-18 | Stop reason: HOSPADM

## 2018-09-05 RX ADMIN — Medication 10 ML: at 19:44

## 2018-09-05 RX ADMIN — DEXTROSE MONOHYDRATE: 50 INJECTION, SOLUTION INTRAVENOUS at 09:57

## 2018-09-05 RX ADMIN — MEMANTINE HYDROCHLORIDE 5 MG: 5 TABLET ORAL at 08:37

## 2018-09-05 RX ADMIN — Medication 10 ML: at 08:40

## 2018-09-05 RX ADMIN — INSULIN LISPRO 1 UNITS: 100 INJECTION, SOLUTION INTRAVENOUS; SUBCUTANEOUS at 23:49

## 2018-09-05 RX ADMIN — CEFEPIME 1 G: 1 INJECTION, POWDER, FOR SOLUTION INTRAMUSCULAR; INTRAVENOUS at 17:38

## 2018-09-05 RX ADMIN — MINERAL SUPPLEMENT IRON 300 MG / 5 ML STRENGTH LIQUID 100 PER BOX UNFLAVORED 300 MG: at 09:57

## 2018-09-05 RX ADMIN — CEFEPIME 1 G: 1 INJECTION, POWDER, FOR SOLUTION INTRAMUSCULAR; INTRAVENOUS at 08:37

## 2018-09-05 RX ADMIN — Medication 10 ML: at 08:37

## 2018-09-05 RX ADMIN — INSULIN LISPRO 1 UNITS: 100 INJECTION, SOLUTION INTRAVENOUS; SUBCUTANEOUS at 12:38

## 2018-09-05 RX ADMIN — IPRATROPIUM BROMIDE AND ALBUTEROL SULFATE 1 AMPULE: .5; 3 SOLUTION RESPIRATORY (INHALATION) at 19:36

## 2018-09-05 RX ADMIN — CEFEPIME 1 G: 1 INJECTION, POWDER, FOR SOLUTION INTRAMUSCULAR; INTRAVENOUS at 23:41

## 2018-09-05 RX ADMIN — IPRATROPIUM BROMIDE AND ALBUTEROL SULFATE 1 AMPULE: .5; 3 SOLUTION RESPIRATORY (INHALATION) at 14:19

## 2018-09-05 RX ADMIN — FOLIC ACID 1 MG: 1 TABLET ORAL at 08:39

## 2018-09-05 RX ADMIN — PANTOPRAZOLE SODIUM 40 MG: 40 INJECTION, POWDER, FOR SOLUTION INTRAVENOUS at 08:37

## 2018-09-05 RX ADMIN — IPRATROPIUM BROMIDE AND ALBUTEROL SULFATE 1 AMPULE: .5; 3 SOLUTION RESPIRATORY (INHALATION) at 03:58

## 2018-09-05 RX ADMIN — Medication 500 MG: at 08:38

## 2018-09-05 ASSESSMENT — PAIN SCALES - PAIN ASSESSMENT IN ADVANCED DEMENTIA (PAINAD)
NEGVOCALIZATION: 0
NEGVOCALIZATION: 0
FACIALEXPRESSION: 0
CONSOLABILITY: 0
TOTALSCORE: 0
CONSOLABILITY: 0
BODYLANGUAGE: 0
TOTALSCORE: 0
FACIALEXPRESSION: 0
NEGVOCALIZATION: 0
TOTALSCORE: 0
NEGVOCALIZATION: 0
CONSOLABILITY: 0
CONSOLABILITY: 0
FACIALEXPRESSION: 0
CONSOLABILITY: 0
BREATHING: 0
BODYLANGUAGE: 0
BODYLANGUAGE: 0
FACIALEXPRESSION: 0
NEGVOCALIZATION: 0
NEGVOCALIZATION: 0
FACIALEXPRESSION: 0
CONSOLABILITY: 0
BODYLANGUAGE: 0
TOTALSCORE: 0
TOTALSCORE: 0
BREATHING: 0
NEGVOCALIZATION: 0
CONSOLABILITY: 0
FACIALEXPRESSION: 0
NEGVOCALIZATION: 0
BODYLANGUAGE: 0
BREATHING: 0
TOTALSCORE: 0
BODYLANGUAGE: 0
BREATHING: 0
NEGVOCALIZATION: 0
FACIALEXPRESSION: 0
NEGVOCALIZATION: 0
FACIALEXPRESSION: 0
BREATHING: 0
BREATHING: 0
NEGVOCALIZATION: 0
NEGVOCALIZATION: 0
FACIALEXPRESSION: 0
NEGVOCALIZATION: 0
BREATHING: 0
TOTALSCORE: 0
FACIALEXPRESSION: 0
BREATHING: 0
NEGVOCALIZATION: 0
BODYLANGUAGE: 0
FACIALEXPRESSION: 0
TOTALSCORE: 0
BREATHING: 0
TOTALSCORE: 0
CONSOLABILITY: 0
NEGVOCALIZATION: 0
TOTALSCORE: 0
BREATHING: 0
BODYLANGUAGE: 0
FACIALEXPRESSION: 0
BREATHING: 0
BODYLANGUAGE: 0
FACIALEXPRESSION: 0
BREATHING: 0
BREATHING: 0
BODYLANGUAGE: 0
TOTALSCORE: 0
CONSOLABILITY: 0
CONSOLABILITY: 0
TOTALSCORE: 0
NEGVOCALIZATION: 0
BODYLANGUAGE: 0
FACIALEXPRESSION: 0
CONSOLABILITY: 0
CONSOLABILITY: 0
TOTALSCORE: 0
TOTALSCORE: 0
NEGVOCALIZATION: 0
FACIALEXPRESSION: 0
BREATHING: 0
TOTALSCORE: 0
FACIALEXPRESSION: 0
FACIALEXPRESSION: 0
CONSOLABILITY: 0
BREATHING: 0
BODYLANGUAGE: 0
TOTALSCORE: 0
BREATHING: 0
FACIALEXPRESSION: 0
TOTALSCORE: 0
BODYLANGUAGE: 0
BODYLANGUAGE: 0
FACIALEXPRESSION: 0
TOTALSCORE: 0
BREATHING: 0
TOTALSCORE: 0
FACIALEXPRESSION: 0
BODYLANGUAGE: 0
FACIALEXPRESSION: 0
TOTALSCORE: 0
BODYLANGUAGE: 0
NEGVOCALIZATION: 0
BODYLANGUAGE: 0
BODYLANGUAGE: 0
BREATHING: 0
BREATHING: 0
CONSOLABILITY: 0
BODYLANGUAGE: 0
CONSOLABILITY: 0
TOTALSCORE: 0
CONSOLABILITY: 0
BREATHING: 0
BODYLANGUAGE: 0
BREATHING: 0
FACIALEXPRESSION: 0
FACIALEXPRESSION: 0
BREATHING: 0
BODYLANGUAGE: 0
NEGVOCALIZATION: 0
CONSOLABILITY: 0
BODYLANGUAGE: 0
FACIALEXPRESSION: 0
BREATHING: 0
BREATHING: 0
NEGVOCALIZATION: 0
CONSOLABILITY: 0
BODYLANGUAGE: 0
CONSOLABILITY: 0
NEGVOCALIZATION: 0
TOTALSCORE: 0
CONSOLABILITY: 0
NEGVOCALIZATION: 0
TOTALSCORE: 0
BODYLANGUAGE: 0
TOTALSCORE: 0

## 2018-09-05 ASSESSMENT — PAIN SCALES - GENERAL: PAINLEVEL_OUTOF10: 0

## 2018-09-05 NOTE — PROGRESS NOTES
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 coli sepsis (HCC)    Acute lower UTI    Stupor    Encephalopathy    Hypothermia    Aspiration pneumonia of left lower lobe due to vomit (HCC)    Coffee ground emesis    Type II or unspecified type diabetes mellitus without mention of complication, not stated as uncontrolled    Pseudophakia    Proliferative diabetic retinopathy ([facility]    MALA (acute kidney injury) ([facility]    UTI (urinary tract infection)    Gram negative sepsis ([facility]       Measurements:  Wound 07/22/18 Abrasion(s) Sacrum Old Pressure Injury with areas of denudation throughout (Active)   Wound Type Wound 9/5/2018  8:00 AM   Wound Pressure Stage  2 9/3/2018  7:30 PM   Dressing Status Intact 9/5/2018  4:00 AM   Dressing Changed Changed/New 9/5/2018  4:00 AM   Dressing/Treatment Foam 9/5/2018  8:00 AM   Dressing Change Due 09/04/18 9/5/2018  4:00 AM   Drainage Amount None 9/5/2018  8:00 AM   Odor None 9/5/2018  8:00 AM   Number of days: 44       Wound 09/03/18 Other (Comment) Heel Right; Inner Pressure Injury (Active)   Wound Type Wound 9/5/2018 12:39 PM   Wound Deep tissue/Injury 9/5/2018 12:39 PM   Dressing Status Changed 9/5/2018 12:39 PM   Dressing Changed Changed/New 9/5/2018 12:39 PM   Dressing/Treatment Barrier Film 9/5/2018 12:39 PM   Wound Length (cm) 4 cm 9/5/2018 12:39 PM   Wound Width (cm) 3 cm 9/5/2018 12:39 PM   Calculated Wound Size (cm^2) (l*w) 12 cm^2 9/5/2018 12:39 PM   Wound Assessment Light purple;Fragile;Dark edges; Intact 9/5/2018 12:39 PM   Drainage Amount None 9/5/2018 12:39 PM   Odor None 2018 12:39 PM   Cata-wound Assessment Clean;Dry; Intact 2018 12:39 PM   Purple%Wound Bed 100 2018 12:39 PM   Number of days: 2       Assessment:    Patient appears to have old healed pressure injury to the sacrum, as the skin is pink and for the most part intact, with the exception of a few areas of denudation within. Patient also found to have Deep Tissue Pressure Injury to the right heel on 9/3/2018 - patient was admitted . Right medial heel have a 4x3 cm area of deep tissue pressure injury - fragile, light purple/ burgundy, intact, with darker edges. Protective barrier wipe applied and allowed to dry before bilateral heels place back in offloading boots. Patient assisted to turn to the left for evaluation of skin, patient having loose stools, incontinence. Denudation noted to bilateral buttocks, ischium, and sacral area where there was a previously healed pressure injury (intact pink new skin with few areas of denudation). Plan   Plan of Care: Wound 18 Other (Comment) Heel Right; Inner Pressure Injury-Dressing/Treatment: [facility]  Wound 18 Abrasion(s) Sacrum Old Pressure Injury with areas of denudation throughout-Dressing/Treatment: Foam      Recommendin) pressure injury prevention intervention 2) protective barrier wipe to heel BID 3) evaluation from podiatry    Specialty Bed Required : Yes   [x] Low Air Loss   [] Pressure Redistribution  [] Fluid Immersion  [] Bariatric  [] Other:     Current Diet: DIET TUBE FEED CONTINUOUS/CYCLIC NPO; Diabetic; Nasogastric; Continuous; 45; 55; 24  Dietician consult:  Yes    Discharge Plan:  Placement for patient upon discharge: skilled nursing    Patient appropriate for Outpatient [address] [facility] [address]: N/A - patient follows [facility]    Referrals:  []   []  [place] [facility]  [] Supplies  [] Other    Patient/Caregiver Teaching:  Level of patient/caregiver understanding able to:   [] Indicates understanding       [x] Needs reinforcement  [] Unsuccessful      [] Verbal Understanding  [] Demonstrated understanding       [] No evidence of learning  [] Refused teaching         [] N/A       Electronically signed by ROBERT GuajardoN, RN, CWOCN on 9/5/2018 at 1:19 PM

## 2018-09-05 NOTE — PROGRESS NOTES
venous distention. Trachea midline. Respiratory:  Poor respiratory efforts. The abdomen is breath sounds and basilar rhonchi. Cardiovascular: Regular rate and rhythm with normal S1/S2 without murmurs, rubs or gallops. Abdomen: Soft, non-tender, non-distended with normal bowel sounds. Musculoskeletal: No clubbing, cyanosis or edema bilaterally. Full range of motion without deformity. Skin: Skin color, texture, turgor normal.  No rashes or lesions. Neuro: As stated above. Advanced functional disability. The patient is unable to lift up her legs against gravity. .      Labs:   Recent Labs      09/03/18   1729  09/04/18   0501  09/05/18   0545   WBC   --   9.6   --    HGB  9.0*  9.3*  9.0*   HCT  27.5*  28.0*  26.5*   PLT   --   47*   --      Recent Labs      09/03/18   0539  09/04/18   0501  09/05/18   0546   NA   --   148*  149*   K   --   3.5  3.7   CL   --   119*  115*   CO2   --   23  24   BUN   --   22  20   CREATININE  1.3*  0.96*  0.73   CALCIUM   --   8.4*  8.9   PHOS   --   2.7   --      Recent Labs      09/04/18   0501   AST  16   ALT  12   BILIDIR  <0.2   BILITOT  0.3   ALKPHOS  74     No results for input(s): INR in the last 72 hours. No results for input(s): Saralee Julito in the last 72 hours. Urinalysis:    Lab Results   Component Value Date    NITRU Negative 08/31/2018    WBCUA 10-20 08/31/2018    BACTERIA Few 08/31/2018    RBCUA 5-10 08/31/2018    BLOODU MODERATE 08/31/2018    SPECGRAV 1.016 08/31/2018    GLUCOSEU Negative 08/31/2018       Radiology:  XR CHEST PORTABLE   Final Result      XR CHEST PORTABLE   Final Result   STABLE CHEST. NO SIGNIFICANT CHANGE. XR CHEST PORTABLE   Final Result      Left lung base opacity compatible with infiltrate. Linear right lung base opacity likely on the basis of atelectasis and/or infiltrate. XR CHEST PORTABLE   Final Result      Interval placement of endotracheal tube, terminating at the level of the clavicles.  Enteric tube secondary to above. Resolved.     Nutrition, tube feed the tolerated per nursing staff.     Hypernatremia, hypokalemia, hypomagnesemia, intravenous infusion. Most are corrected except the sodium I will start D5 water for 500 mL only.     DVT prophylaxis, sequential compressive devices. No pharmacological measures due to her thrombocytopenia.     Plan:  MRI of the head didn't rule out acute ischemic stroke or a subacute over the last 5 days. Neuro consultation to evaluate her encephalopathy and provide further advice and recommendation. Attempted to correct her hypernatremia as may be contributing to her encephalopathy. Continue intravenous biotic  Continue to feeding. Trace water flushing for the hypernatremia. Potassium and magnesium as well as hypotonic fluid infusion. .  PT OT eval and treatment. Change Sánchez catheter. Swallow eval.  Advance activity. Preparation for discharge. Continue to monitor her platelets level. I expect platelets are #1 improve over the next few days. Additional work up or/and treatment plan may be added today or then after based on clinical progression. I am managing a portion of pt care. Some medical issues are handled by other specialists. Additional work up and treatment should be done in out pt setting by pt PCP and other out pt providers. In addition to examining and evaluating pt, I spent additional time explaining care, normal and abnormal findings, and treatment plan. All of pt questions were answered. Counseling, diet and education were  provided. Case will be discussed with nursing staff when appropriate. Family will be updated if and when appropriate.       Diet: DIET TUBE FEED CONTINUOUS/CYCLIC NPO; Diabetic; Nasogastric; Continuous; 45; 55; 24    Code Status: Full Code    PT/OT Eval           Electronically signed by Nora Morales MD on 9/5/2018 at 9:09 AM

## 2018-09-05 NOTE — PROGRESS NOTES
assist.  Long-term Goals  Timeframe for Long-term Goals: 1 week  Goal 1: Pt will tolerate least restrictive diet with no overt s/s of aspiration. Prognosis  Prognosis  Prognosis for safe diet advancement: fair  Barriers to reach goals: severity of dysphagia  Individuals consulted  Consulted and agree with results and recommendations: RN;Family member (Windy Langley RN)  Family member consulted: Daughter    Education  Patient Education: Educated daughter at end of evaluation when she arrived. Patient Education Response: Verbalizes understanding    G-Code:  SLP G-Codes  Functional Limitations: Swallowing  Swallow Current Status (): 100 percent impaired, limited or restricted  Swallow Goal Status (): At least 60 percent but less than 80 percent impaired, limited or restricted    Pain:  Pain Assessment  Patient Currently in Pain: Unable to Assess         Therapy Time  SLP Individual Minutes  Time In: 1020  Time Out: 56  Minutes: [address]  Ki [place], Date: 9/5/2018, Time: 10:40 AM

## 2018-09-05 NOTE — PROGRESS NOTES
Renal Adjustment Per Protocol:   Recent Labs      09/05/18   0546   CREATININE  0.73    Estimated Creatinine Clearance: 70 mL/min (based on SCr of 0.73 mg/dL).     Renal function continues to improve-increase maxipime to every 8 hours

## 2018-09-05 NOTE — PROGRESS NOTES
view of the chest obtained. Comparison: Chest radiograph from September 1, 2013 00 hours Findings: Endotracheal tube and enteric tube are in satisfactory position. Atherosclerotic calcification of the thoracic aorta. . Mediastinal silhouette is within normal limits. Left lung base opacities compatible with infiltrate. Linear right lung base opacities are present. Retrocardiac lucency compatible with hiatal hernia. No pneumothorax or pleural effusion. Osseous structures are intact. Left lung base opacity compatible with infiltrate. Linear right lung base opacity likely on the basis of atelectasis and/or infiltrate. Xr Chest Portable    Result Date: 9/1/2018  Portable chest radiograph History: Intubation. Technique: AP portable view of the chest obtained. Comparison: Chest radiograph from September 1, 2018, 1045 hours Findings: Interval placement of an endotracheal tube, its tip terminating at the level of the clavicles. Interval placement of an enteric tube, which abruptly curves just proximal to the diaphragm likely secondary to the patient's hiatal hernia. Atherosclerotic ossification of the thoracic aorta. Continued mediastinal silhouette is within normal limits. Patchy bilateral opacities within each lung base. A pneumothorax or pleural effusion. Interval placement of endotracheal tube, terminating at the level of the clavicles. Enteric tube is present and abruptly turns proximal diaphragm likely due to the patient's hiatal hernia. Subsegmental atelectasis versus small infiltrate in both lung bases. Xr Chest Portable    Result Date: 9/1/2018  Portable chest radiograph History: Shortness of breath Technique: AP portable view of the chest obtained. Comparison: Chest x-ray from August 30, 2018 Findings: Atherosclerotic calcification of the thoracic aorta. Surgical clips project over the cardiac silhouette. The cardiomediastinal silhouette is within normal limits.  No pneumothorax, pleural effusion, or focal consolidation. Osseous structures of the thorax  are intact. IMPRESSION: No acute intrathoracic process. Xr Chest Portable    Result Date: 9/1/2018  EXAMINATION: PORTABLE CHEST X-RAY FROM 9/1/2018 AT 10:45 HOURS CLINICAL HISTORY: CHECK RIGHT CENTRAL VENOUS LINE PLACEMENT COMPARISONS: 8/31/2018 FINDINGS: There is borderline cardiomegaly. There is atherosclerotic tortuous aorta. The distal tip of the right central venous catheter is extending into the right side of the neck and presumably within the right internal jugular vein and the right CVC should be repositioned. There is no pneumothorax. There is subsegmental atelectasis or developing small streaky infiltrate in the left lung base. Rounded lucency superimposed on the left cardiac silhouette may very well represent a hiatal hernia. There is degenerative bone spurring throughout the spine. 1. DISTAL TIP OF RIGHT CENTRAL VENOUS LINE IS IN RIGHT SIDE OF THE NECK AND RIGHT CVC SHOULD BE REPOSITIONED. 2. NO EVIDENCE OF A PNEUMOTHORAX POST RIGHT CVC LINE PLACEMENT. 3. SUSPECT SUBSEGMENTAL ATELECTASIS OR PERHAPS DEVELOPING SMALL INFILTRATE IN LEFT LUNG BASE. 4. BORDERLINE CARDIOMEGALY AND ATHEROSCLEROTIC TORTUOUS AORTA. Xr Chest Portable    Result Date: 8/30/2018  EXAMINATION: CHEST PORTABLE VIEW  CLINICAL HISTORY: Short of breath COMPARISONS: July 22, 2018  FINDINGS: Single  views of the chest is submitted. Metallic density again overlying the cardiac silhouette. Unchanged The cardiac silhouette is of normal size configuration. The mediastinum is unremarkable. Pulmonary vascular unremarkable. Right sided trachea. No focal infiltrates. No Pneumothoraces. NO ACUTE ACTIVE CARDIOPULMONARY PROCESS            IMPRESSION AND SUGGESTION:  1. Acute hypoxemic respiratory failure secondary to aspiration pneumonia, patient is showing continued improvement since yesterday.

## 2018-09-05 NOTE — PROGRESS NOTES
Patient inc of soft brown stool. Cleaned and repositioned. Patient fighting when turned. 1100 Daughter at the bedside. States patient is usually more alert but is always confused. DR Chairez Nicely at the bedside and patient responded to him. Told him to stop and leave her alone. 1600 Patient sent for a MRI and is awaiting a bed on [facility] floor. Daughter aware of the MRI and that the patient may transfer.

## 2018-09-05 NOTE — CONSULTS
Inpatient consult to Neurology  Consult performed by: Lisandra Soto  Consult ordered by: Juan Tyler      encephalopathy, UTI  Has gaze preference to the right  Need to r/o cva  MRI pending  Dementia  Gait ataxia    Og Calvillo MD, Keiry Pemberton, American Board of Psychiatry & Neurology  Board Certified in Vascular Neurology  Board Certified in Neuromuscular Medicine  Certified in . [place]

## 2018-09-06 ENCOUNTER — APPOINTMENT (OUTPATIENT)
Dept: ULTRASOUND IMAGING | Age: 73
DRG: 466 | End: 2018-09-06
Payer: MEDICAID

## 2018-09-06 LAB
ANION GAP SERPL CALCULATED.3IONS-SCNC: 9 MEQ/L (ref 7–13)
BASOPHILS ABSOLUTE: 0 K/UL (ref 0–0.2)
BASOPHILS RELATIVE PERCENT: 0.2 %
BLOOD BANK DISPENSE STATUS: NORMAL
BLOOD BANK DISPENSE STATUS: NORMAL
BLOOD BANK PRODUCT CODE: NORMAL
BLOOD BANK PRODUCT CODE: NORMAL
BLOOD CULTURE, ROUTINE: NORMAL
BPU ID: NORMAL
BPU ID: NORMAL
BUN BLDV-MCNC: 15 MG/DL (ref 8–23)
CALCIUM SERPL-MCNC: 9.3 MG/DL (ref 8.6–10.2)
CHLORIDE BLD-SCNC: 113 MEQ/L (ref 98–107)
CO2: 26 MEQ/L (ref 22–29)
CREAT SERPL-MCNC: 0.52 MG/DL (ref 0.5–0.9)
CULTURE, BLOOD 2: NORMAL
DESCRIPTION BLOOD BANK: NORMAL
DESCRIPTION BLOOD BANK: NORMAL
EOSINOPHILS ABSOLUTE: 0.2 K/UL (ref 0–0.7)
EOSINOPHILS RELATIVE PERCENT: 2.7 %
GFR AFRICAN AMERICAN: >60
GFR NON-AFRICAN AMERICAN: >60
GLUCOSE BLD-MCNC: 152 MG/DL (ref 60–115)
GLUCOSE BLD-MCNC: 156 MG/DL (ref 60–115)
GLUCOSE BLD-MCNC: 160 MG/DL (ref 60–115)
GLUCOSE BLD-MCNC: 85 MG/DL (ref 74–109)
HCT VFR BLD CALC: 28.6 % (ref 37–47)
HEMOGLOBIN: 9.6 G/DL (ref 12–16)
LYMPHOCYTES ABSOLUTE: 0.9 K/UL (ref 1–4.8)
LYMPHOCYTES RELATIVE PERCENT: 11.1 %
MAGNESIUM: 1.9 MG/DL (ref 1.7–2.3)
MCH RBC QN AUTO: 26.7 PG (ref 27–31.3)
MCHC RBC AUTO-ENTMCNC: 33.5 % (ref 33–37)
MCV RBC AUTO: 79.7 FL (ref 82–100)
MONOCYTES ABSOLUTE: 0.5 K/UL (ref 0.2–0.8)
MONOCYTES RELATIVE PERCENT: 7 %
NEUTROPHILS ABSOLUTE: 6.2 K/UL (ref 1.4–6.5)
NEUTROPHILS RELATIVE PERCENT: 79 %
PDW BLD-RTO: 21.3 % (ref 11.5–14.5)
PERFORMED ON: ABNORMAL
PHOSPHORUS: 3.3 MG/DL (ref 2.5–4.5)
PLATELET # BLD: 76 K/UL (ref 130–400)
PLATELET SLIDE REVIEW: ABNORMAL
POTASSIUM SERPL-SCNC: 4.2 MEQ/L (ref 3.5–5.1)
RBC # BLD: 3.58 M/UL (ref 4.2–5.4)
SODIUM BLD-SCNC: 148 MEQ/L (ref 132–144)
WBC # BLD: 7.9 K/UL (ref 4.8–10.8)

## 2018-09-06 PROCEDURE — 99232 SBSQ HOSP IP/OBS MODERATE 35: CPT | Performed by: INTERNAL MEDICINE

## 2018-09-06 PROCEDURE — 6360000002 HC RX W HCPCS: Performed by: INTERNAL MEDICINE

## 2018-09-06 PROCEDURE — 2700000000 HC OXYGEN THERAPY PER DAY

## 2018-09-06 PROCEDURE — 84100 ASSAY OF PHOSPHORUS: CPT

## 2018-09-06 PROCEDURE — 92526 ORAL FUNCTION THERAPY: CPT

## 2018-09-06 PROCEDURE — 2580000003 HC RX 258: Performed by: NURSE PRACTITIONER

## 2018-09-06 PROCEDURE — 2580000003 HC RX 258: Performed by: INTERNAL MEDICINE

## 2018-09-06 PROCEDURE — 6370000000 HC RX 637 (ALT 250 FOR IP): Performed by: INTERNAL MEDICINE

## 2018-09-06 PROCEDURE — 93970 EXTREMITY STUDY: CPT

## 2018-09-06 PROCEDURE — 51702 INSERT TEMP BLADDER CATH: CPT

## 2018-09-06 PROCEDURE — 6370000000 HC RX 637 (ALT 250 FOR IP): Performed by: NURSE PRACTITIONER

## 2018-09-06 PROCEDURE — C9113 INJ PANTOPRAZOLE SODIUM, VIA: HCPCS | Performed by: INTERNAL MEDICINE

## 2018-09-06 PROCEDURE — 94664 DEMO&/EVAL PT USE INHALER: CPT

## 2018-09-06 PROCEDURE — 85025 COMPLETE CBC W/AUTO DIFF WBC: CPT

## 2018-09-06 PROCEDURE — 80048 BASIC METABOLIC PNL TOTAL CA: CPT

## 2018-09-06 PROCEDURE — G8981 BODY POS CURRENT STATUS: HCPCS

## 2018-09-06 PROCEDURE — 2060000000 HC ICU INTERMEDIATE R&B

## 2018-09-06 PROCEDURE — G8982 BODY POS GOAL STATUS: HCPCS

## 2018-09-06 PROCEDURE — 94640 AIRWAY INHALATION TREATMENT: CPT

## 2018-09-06 PROCEDURE — 83735 ASSAY OF MAGNESIUM: CPT

## 2018-09-06 PROCEDURE — 97162 PT EVAL MOD COMPLEX 30 MIN: CPT

## 2018-09-06 RX ORDER — IPRATROPIUM BROMIDE AND ALBUTEROL SULFATE 2.5; .5 MG/3ML; MG/3ML
1 SOLUTION RESPIRATORY (INHALATION) 3 TIMES DAILY
Status: DISCONTINUED | OUTPATIENT
Start: 2018-09-06 | End: 2018-09-09

## 2018-09-06 RX ORDER — ALBUTEROL SULFATE 2.5 MG/3ML
2.5 SOLUTION RESPIRATORY (INHALATION)
Status: DISCONTINUED | OUTPATIENT
Start: 2018-09-06 | End: 2018-09-09

## 2018-09-06 RX ORDER — DEXTROSE MONOHYDRATE 50 MG/ML
INJECTION, SOLUTION INTRAVENOUS CONTINUOUS
Status: DISPENSED | OUTPATIENT
Start: 2018-09-06 | End: 2018-09-06

## 2018-09-06 RX ADMIN — MEMANTINE HYDROCHLORIDE 5 MG: 5 TABLET ORAL at 09:28

## 2018-09-06 RX ADMIN — Medication 500 MG: at 09:28

## 2018-09-06 RX ADMIN — Medication 10 ML: at 09:28

## 2018-09-06 RX ADMIN — IPRATROPIUM BROMIDE AND ALBUTEROL SULFATE 1 AMPULE: .5; 3 SOLUTION RESPIRATORY (INHALATION) at 04:06

## 2018-09-06 RX ADMIN — FOLIC ACID 1 MG: 1 TABLET ORAL at 09:28

## 2018-09-06 RX ADMIN — IPRATROPIUM BROMIDE AND ALBUTEROL SULFATE 1 AMPULE: 2.5; .5 SOLUTION RESPIRATORY (INHALATION) at 19:57

## 2018-09-06 RX ADMIN — Medication 10 ML: at 09:29

## 2018-09-06 RX ADMIN — CEFEPIME 1 G: 1 INJECTION, POWDER, FOR SOLUTION INTRAMUSCULAR; INTRAVENOUS at 17:06

## 2018-09-06 RX ADMIN — Medication 10 ML: at 20:52

## 2018-09-06 RX ADMIN — PANTOPRAZOLE SODIUM 40 MG: 40 INJECTION, POWDER, FOR SOLUTION INTRAVENOUS at 09:29

## 2018-09-06 RX ADMIN — CEFEPIME 1 G: 1 INJECTION, POWDER, FOR SOLUTION INTRAMUSCULAR; INTRAVENOUS at 09:28

## 2018-09-06 RX ADMIN — DEXTROSE MONOHYDRATE: 50 INJECTION, SOLUTION INTRAVENOUS at 12:49

## 2018-09-06 RX ADMIN — DOCUSATE SODIUM 100 MG: 50 LIQUID ORAL at 20:55

## 2018-09-06 RX ADMIN — IPRATROPIUM BROMIDE AND ALBUTEROL SULFATE 1 AMPULE: .5; 3 SOLUTION RESPIRATORY (INHALATION) at 11:28

## 2018-09-06 ASSESSMENT — PAIN SCALES - PAIN ASSESSMENT IN ADVANCED DEMENTIA (PAINAD)
CONSOLABILITY: 0
FACIALEXPRESSION: 0
BREATHING: 0
CONSOLABILITY: 0
BODYLANGUAGE: 0
TOTALSCORE: 0
BREATHING: 0
BODYLANGUAGE: 0
CONSOLABILITY: 0
BODYLANGUAGE: 0
FACIALEXPRESSION: 0
TOTALSCORE: 0
TOTALSCORE: 0
BODYLANGUAGE: 0
CONSOLABILITY: 0
BREATHING: 0
CONSOLABILITY: 0
CONSOLABILITY: 0
TOTALSCORE: 0
TOTALSCORE: 0
BREATHING: 0
CONSOLABILITY: 0
FACIALEXPRESSION: 0
CONSOLABILITY: 0
FACIALEXPRESSION: 0
CONSOLABILITY: 0
BREATHING: 0
NEGVOCALIZATION: 0
BODYLANGUAGE: 0
FACIALEXPRESSION: 0
BODYLANGUAGE: 0
BREATHING: 0
NEGVOCALIZATION: 0
FACIALEXPRESSION: 0
CONSOLABILITY: 0
TOTALSCORE: 0
CONSOLABILITY: 0
BODYLANGUAGE: 0
NEGVOCALIZATION: 0
TOTALSCORE: 0
TOTALSCORE: 0
NEGVOCALIZATION: 0
NEGVOCALIZATION: 0
BREATHING: 0
CONSOLABILITY: 0
BODYLANGUAGE: 0
BODYLANGUAGE: 0
FACIALEXPRESSION: 0
NEGVOCALIZATION: 0
NEGVOCALIZATION: 0
FACIALEXPRESSION: 0
BODYLANGUAGE: 0
FACIALEXPRESSION: 0
BREATHING: 0
TOTALSCORE: 0
NEGVOCALIZATION: 0
BREATHING: 0
CONSOLABILITY: 0
BREATHING: 0
BREATHING: 0
BODYLANGUAGE: 0
TOTALSCORE: 0
BODYLANGUAGE: 0
NEGVOCALIZATION: 0
FACIALEXPRESSION: 0
NEGVOCALIZATION: 0
CONSOLABILITY: 0
FACIALEXPRESSION: 0
FACIALEXPRESSION: 0
TOTALSCORE: 0
BREATHING: 0
FACIALEXPRESSION: 0
TOTALSCORE: 0
TOTALSCORE: 0
BREATHING: 0
BODYLANGUAGE: 0
TOTALSCORE: 0
NEGVOCALIZATION: 0
NEGVOCALIZATION: 0
BODYLANGUAGE: 0
NEGVOCALIZATION: 0
FACIALEXPRESSION: 0
CONSOLABILITY: 0
BREATHING: 0
BODYLANGUAGE: 0
FACIALEXPRESSION: 0
BREATHING: 0
BODYLANGUAGE: 0
FACIALEXPRESSION: 0
CONSOLABILITY: 0
TOTALSCORE: 0
TOTALSCORE: 0
BODYLANGUAGE: 0
NEGVOCALIZATION: 0
NEGVOCALIZATION: 0
BODYLANGUAGE: 0
BREATHING: 0
NEGVOCALIZATION: 0
TOTALSCORE: 0
NEGVOCALIZATION: 0
BREATHING: 0
FACIALEXPRESSION: 0
FACIALEXPRESSION: 0
NEGVOCALIZATION: 0
NEGVOCALIZATION: 0
TOTALSCORE: 0
BREATHING: 0
TOTALSCORE: 0
CONSOLABILITY: 0
TOTALSCORE: 0
TOTALSCORE: 0
BREATHING: 0
BREATHING: 0
FACIALEXPRESSION: 0
BODYLANGUAGE: 0
CONSOLABILITY: 0
FACIALEXPRESSION: 0
CONSOLABILITY: 0
TOTALSCORE: 0
NEGVOCALIZATION: 0
NEGVOCALIZATION: 0
CONSOLABILITY: 0
NEGVOCALIZATION: 0

## 2018-09-06 ASSESSMENT — PAIN SCALES - GENERAL
PAINLEVEL_OUTOF10: 0

## 2018-09-06 NOTE — PROGRESS NOTES
Pulmonary Disorder  (acute or chronic)  [x]   Severe or Chronic w/ Exacerbation  []     Surgical Status No [x]   Surgeries     General []   Surgery Lower []   Abdominal Thoracic or []   Upper Abdominal Thoracic with  PulmonaryDisorder  []     Chest X-ray Clear/Not  Ordered     []  Chronic Changes  Results Pending  []  Infiltrates, atelectasis, pleural effusion, or edema  []  Infiltrates in more than one lobe [x]  Infiltrate + Atelectasis, &/or pleural effusion  []    Respiratory Pattern Regular,  RR = 12-20 [x]  Increased,  RR = 21-25 []  VIZCARRA, irregular,  or RR = 26-30 []  Decreased FEV1  or RR = 31-35 []  Severe SOB, use  of accessory muscles, or RR ? 35  []    Mental Status Alert, oriented,  Cooperative []  Confused but Follows commands [x]  Lethargic or unable to follow commands []  Obtunded  []  Comatose  []    Breath Sounds Clear to  auscultation  []  Decreased unilaterally or  in bases only [x]  Decreased  bilaterally  []  Crackles or intermittent wheezes []  Wheezes []    Cough Strong, Spontan., & nonproductive [x]  Strong,  spontaneous, &  productive []  Weak,  Nonproductive []  Weak, productive or  with wheezes []  No spontaneous  cough or may require suctioning []    Level of Activity Ambulatory []  Ambulatory w/ Assist  []  Non-ambulatory [x]  Paraplegic []  Quadriplegic []    Total    Score:__10_____     Triage Score:___4_____      Tri       Triage:     1. (>20) Freq: Q3    2. (16-20) Freq: Q4   3. (11-15) Freq: QID & Albuterol Q2 PRN    4. (6-10) Freq: TID & Albuterol Q2 PRN    5. (0-5) Freq Q4prn

## 2018-09-06 NOTE — PROGRESS NOTES
Pt to [facility] via bed with telemetry. Called to update RN.  Electronically signed by Fernanda Dior RN on 9/6/2018 at 1:50 PM

## 2018-09-06 NOTE — PROGRESS NOTES
Report called to 1 [facility] nurse. TF stopped and NG flushed with 50 ml H2O. Awaiting transport.  Electronically signed by Loy Bradshaw RN on 9/6/2018 at 12:57 PM

## 2018-09-06 NOTE — PROGRESS NOTES
CRITICAL CARE PROGRESS NOTES    PATIENT NAME: Susan Bruno  MRN: [id number]  SERVICE DATE:  September 6, 2018   SERVICE TIME:  1:50 PM      PRIMARY SERVICE: Critical care medicine    CHIEF COMPLAINTS: Sleepiness    INTERVAL HPI: Patient seen and examined at bedside, Interval Notes, orders reviewed. Discussed on multidisciplinary rounds  Patient remains stable since yesterday, oxygenation status remains stable on 2 L of oxygen per nasal cannula, periodically coughing and expectorating some sputum. Few rhonchi noted on exam.  Generally she is doing better      OBJECTIVE    Body mass index is 31.18 kg/m². PHYSICAL EXAM:  Vitals:  BP (!) 151/50   Pulse 79   Temp 96.8 °F (36 °C) (Temporal)   Resp 22   Ht 5' 4\" (1.626 m)   Wt 181 lb 10.5 oz (82.4 kg)   SpO2 98%   BMI 31.18 kg/m²   General: She is  Alert, awake . comfortable in bed, No distress. Head: Atraumatic , Normocephalic   Eyes: PERRL. No icteric sclera. No conjunctival injection. No discharge   ENT: No nasal  discharge. Pharynx clear. Neck:  Trachea midline. No thyromegaly, no JVD, No cervical adenopathy. Chest : Adequate spontaneous effort, symmetric bilateral excursions  Lung : Diminished breath sounds bilaterally, scattered rhonchi  Heart[de-identified] Regular rhythm and rate. No mumur ,  Rub or gallop  ABD: Benign. Non-tender. Non-distended. No masses or organmegaly. Normal bowel sounds. EXT: Mild Pitting edema both lower extremities , No Cyanosis No clubbing  Neuro: no focal weakness  Skin: Warm and dry. No erythema or rash on exposed extremities.       DATA:   Recent Labs      09/04/18   0501  09/05/18   0545  09/06/18   0553   WBC  9.6   --   7.9   HGB  9.3*  9.0*  9.6*   HCT  28.0*  26.5*  28.6*   MCV  80.1*   --   79.7*   PLT  47*   --   76*     Recent Labs      09/04/18   0501  09/05/18   0546  09/06/18   0551   NA  148*  149*  148*   K  3.5  3.7  4.2   CL  119*  115*  113*   CO2  23  24  26   BUN  22  20  15   CREATININE  0.96*  0.73  0.52 matter hypoattenuation is likely a sequela of small vessel disease. There is no acute CVA. There is no acute intra-axial or extra-axial findings in the brain. There is no intracranial mass, hemorrhage, \"mass effect\" or midline shift. The remainder of the CT scan of the brain appears unremarkable. No significant change since the 5/14/2018 CT brain. 1. CEREBRAL ATROPHY AND AGE RELATED FINDINGS IN THE BRAIN. 2. NO ACUTE INTRA-AXIAL OR EXTRA-AXIAL FINDINGS IN THE BRAIN. 3. NO SIGNIFICANT CHANGE SINCE 5/14/2018 CT BRAIN. All CT scans at this facility use dose modulation, iterative reconstruction, and/or weight based dosing when appropriate to reduce radiation dose to as low as reasonably achievable. Xr Chest Portable    Result Date: 9/4/2018  EXAMINATION: XR CHEST PORTABLE CLINICAL HISTORY:  SOB, hypoxemia . Extubation follow-up. COMPARISONS: September 3, 2018, 1 day prior FINDINGS: Single AP portable view the chest is obtained on September 4, 2018 at 1027 hours. The endotracheal tube has been removed. The gastric tube remains in place and has not changed. There is new bilateral edema and/or infiltrate. The heart has not enlarged. The mediastinum has not widened or shifted. There remains no pneumothorax. There remains no large effusion. CONCLUSION: WORSENING LUNG FINDINGS, WITH NEW BILATERAL EDEMA/INFILTRATE. Xr Chest Portable    Result Date: 9/3/2018  EXAMINATION: XR CHEST, PORTABLE SINGLE VIEW: DATE AND TIME: 9/3/2018 at 6:00 AM. CLINICAL HISTORY: SHORTNESS OF BREATH. FOLLOW-UP ASPIRATION PNEUMONIA. COMPARISON: September 2, 2018. FINDINGS: ET tube and NG tube remain in satisfactory position. Minimal patchy opacities at the lung bases. No consolidation. No new infiltrates. STABLE CHEST. NO SIGNIFICANT CHANGE. Xr Chest Portable    Result Date: 9/2/2018  Portable chest radiograph History: Aspiration pneumonia Technique: AP portable view of the chest obtained.  Comparison: Chest radiograph from intact. IMPRESSION: No acute intrathoracic process. Xr Chest Portable    Result Date: 9/1/2018  EXAMINATION: PORTABLE CHEST X-RAY FROM 9/1/2018 AT 10:45 HOURS CLINICAL HISTORY: CHECK RIGHT CENTRAL VENOUS LINE PLACEMENT COMPARISONS: 8/31/2018 FINDINGS: There is borderline cardiomegaly. There is atherosclerotic tortuous aorta. The distal tip of the right central venous catheter is extending into the right side of the neck and presumably within the right internal jugular vein and the right CVC should be repositioned. There is no pneumothorax. There is subsegmental atelectasis or developing small streaky infiltrate in the left lung base. Rounded lucency superimposed on the left cardiac silhouette may very well represent a hiatal hernia. There is degenerative bone spurring throughout the spine. 1. DISTAL TIP OF RIGHT CENTRAL VENOUS LINE IS IN RIGHT SIDE OF THE NECK AND RIGHT CVC SHOULD BE REPOSITIONED. 2. NO EVIDENCE OF A PNEUMOTHORAX POST RIGHT CVC LINE PLACEMENT. 3. SUSPECT SUBSEGMENTAL ATELECTASIS OR PERHAPS DEVELOPING SMALL INFILTRATE IN LEFT LUNG BASE. 4. BORDERLINE CARDIOMEGALY AND ATHEROSCLEROTIC TORTUOUS AORTA. Xr Chest Portable    Result Date: 8/30/2018  EXAMINATION: CHEST PORTABLE VIEW  CLINICAL HISTORY: Short of breath COMPARISONS: July 22, 2018  FINDINGS: Single  views of the chest is submitted. Metallic density again overlying the cardiac silhouette. Unchanged The cardiac silhouette is of normal size configuration. The mediastinum is unremarkable. Pulmonary vascular unremarkable. Right sided trachea. No focal infiltrates. No Pneumothoraces. NO ACUTE ACTIVE CARDIOPULMONARY PROCESS    Mri Brain Wo Contrast    Result Date: 9/5/2018  MRI BRAIN WO CONTRAST : 9/5/2018 CLINICAL HISTORY:  Altered MS . COMPARISON: Head CT done 118, and head MRI 5/10/2018.  TECHNIQUE: Multiplanar MR imaging of the head was performed medical floor    Electronically signed by Anmol Olvera MD, FCCP on 9/6/2018 at 1:50 PM

## 2018-09-06 NOTE — PROGRESS NOTES
Physical Therapy Med Surg Initial Assessment  Facility/Department: [address] [place] [address]  [facility]       NAME: Huma Rincon  : 1945 (75 y.o.)  MRN: [id number]  CODE STATUS: Full Code    Date of Service: 2018    Patient Diagnosis(es): UTI (urinary tract infection) [N39.0]   Chief Complaint   Patient presents with    Illness     lethargic x2days, vomit bile, ls rhonchi and crackles per NH, was seen yesterday for same c/o     Patient Active Problem List    Diagnosis Date Noted    Gram negative sepsis ([address]     UTI (urinary tract infection) 2018    MALA (acute kidney injury) ([address] 2018    Coffee ground emesis 2018    Aspiration pneumonia of left lower lobe due to vomit ([address]     Encephalopathy     Hypothermia     E. 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, not stated as uncontrolled(250.50) 2010    Pseudophakia 2010    Proliferative diabetic retinopathy ([address] 2010    GIST, malignant ([address] 10/05/2009        Past Medical History:   Diagnosis Date    Alzheimer disease     Diabetes mellitus ([address]     GERD (gastroesophageal reflux disease)     Hypertension     Osteoarthritis     Retinopathy      Past Surgical History:   Procedure Laterality Date    EYE SURGERY      HYSTERECTOMY      RI EGD TRANSORAL BIOPSY SINGLE/MULTIPLE N/A 2017    EGD BIOPSY performed by Linden Arita MD at [address] [place] [address] EGD TRANSORAL BIOPSY SINGLE/MULTIPLE N/A 6/7/2018    EGD performed by Linden Arita MD at [facility]       Chart Reviewed: Yes  Family / Caregiver Present: Yes (brother present for partial treatment)  General Comment  Comments: Pt resting in bed - agreeable to attempt mobility with PT    Restrictions:  Restrictions/Precautions: Fall Risk  Position Activity Restriction  Other position/activity restrictions: PEG tube precautions     SUBJECTIVE: Subjective: Pt mostly nonverbal, attempting to speak, however having immense difficulty. Pt shaking her head \"yes/no\"  Pre Treatment Pain Screening  Comments / Details: unable to formally assess. Pt with severe pain R knee during ROM assessment. Post Treatment Pain Screening:   Pain Assessment  Pain Assessment:  (unable to formally assess, however pt without wincing upon assessment finish)    Prior Level of Function:  Social/Functional History  Additional Comments: Pt unable to provide PLOF. Per chart review and brother's report, pt had been resident of [facility] and had been getting up to [facility] periodically, however pt's brother unsure of how she transferred. OBJECTIVE:   Vision/Hearing:  Vision:  (gaze drifting to superiorly and to R)  Hearing: Within functional limits (responds appropriately)    Cognition:  Overall Orientation Status: Impaired  Orientation Level: Unable to assess  Follows Commands: Within Functional Limits    Observation/Palpation  Observation: pt resting in supine with bias to L side. no acute distress noted. Pt's PEG feed held temporarily during assessment. Resumed following. Edema: B hands/UEs.     ROM:  RLE General PROM: Unable to assess knee/hip d/t severe pain/crying out with PROM. Ankle assessed with functinal range  LLE General PROM: stiffness throughout knee and hip. Ankle assessed with functional range  RUE General PROM: resisting most movement @ shoulder and elbow.    LUE General PROM: limited throughout shoulder    Strength:  Strength Other  Other: Pt able to squeeze with B hands 2/5 strength, and actively wiggle all toes without resistance. Unable to raise extremities against gravity or turn head volitionally. Neuro:  Balance  Comments: Unable to assess     Motor Control  Gross Motor?:  (Difficult to assess tone d/t active resistance to movement and painful ranges B LE. Does demonstrate stiffness/spastic-like tone L LE)       Bed mobility  Rolling to Left: Dependent/Total  Rolling to Right: Dependent/Total  Comment: Attempting rolling, however pt with heavy resistance, especially to R side roll. Pt unable to effectively follow verbal commands. Transfers  Comment: NT - safety concerns    Ambulation  Ambulation?: No  Stairs/Curb  Stairs?: No    Activity Tolerance  Activity Tolerance: Patient limited by pain; Patient limited by cognitive status  Activity Tolerance: Weakness          ASSESSMENT:   Body structures, Functions, Activity limitations: Decreased functional mobility ; Decreased strength;Decreased endurance;Decreased ADL status; Decreased cognition;Decreased ROM; Decreased balance;Decreased vision/visual deficit; Decreased coordination  Decision Making: Medium Complexity  History: High  Exam: Med  Clinical Presentation: High    Prognosis: Fair  Patient Education: PT POC; DC recs; role of PT in the hosp  Barriers to Learning: cognition    DISCHARGE RECOMMENDATIONS:  Discharge Recommendations: Patient would benefit from continued therapy after discharge, Continue to assess pending progress    Assessment: Continued PT indicated for trial program to progress/instruct functional mobility and address strength and activity tolerance to prevent further decline and secondary effects of immobility to ensure DC at highest level of indep with decreased burden of care.  D/t pt's communication/cognitive barriers, it is difficult to assess baseline function and accurate functional prognosis at this

## 2018-09-06 NOTE — CARE COORDINATION
Patient remains NPO with NG tube. SLP continues to recommend NPO. Discussed with Dr. Yareli Jurado. He is not familiar with patient but unsure of potential needs for alternate nutrition. States MRI negative - issues likely metabolic and \"time will tell. \"

## 2018-09-06 NOTE — PROGRESS NOTES
AM assessments as noted. Family was at the bedside and were update on pt condition and plan of care. They participated in grand rounds. Pt repositioned Q 2 hours and after tests. Ultrasound and swallow eval completed. Pt sis not pass swallow. See speech notes for recommendations. Pt remains NPO with TF infusing. She is tolerating tube feed.  Electronically signed by Zander Hill RN on 9/6/2018 at 12:26 PM

## 2018-09-06 NOTE — PROGRESS NOTES
Hospitalist Progress Note      PCP: Zohaib Wilkinson MD    Date of Admission: 8/31/2018    Chief Complaint:  Patient is definitely more awake Today. She is able to answer some basic questions. She has advanced functional impairment and barely able to lift upper extremities against gravity. To feeding as tolerated. No vomiting. No fever, no chills. The stable hemodynamics. As discussed with night nursing, no other acute signs or symptoms. Subjective: : She was unable to provide additional information. Medications:  Reviewed    Infusion Medications    dextrose      dextrose       Scheduled Medications    ferrous sulfate  300 mg Oral Daily with breakfast    pantoprazole  40 mg Intravenous Daily    And    sodium chloride (PF)  10 mL Intravenous BID    cefepime  1 g Intravenous Q8H    docusate  100 mg Oral BID    ipratropium-albuterol  1 ampule Inhalation 4x daily    insulin lispro  0-6 Units Subcutaneous 4 times per day    calcium elemental  500 mg Oral Daily    folic acid  1 mg Oral Daily    memantine  5 mg Oral Daily    sodium chloride flush  10 mL Intravenous 2 times per day     PRN Meds: senna, guaiFENesin, acetaminophen, sodium chloride flush, magnesium hydroxide, ondansetron, glucose, dextrose, glucagon (rDNA), dextrose      Intake/Output Summary (Last 24 hours) at 09/06/18 0802  Last data filed at 09/06/18 0530   Gross per 24 hour   Intake             2759 ml   Output             1200 ml   Net             1559 ml       Exam:    BP (!) 154/59   Pulse 75   Temp 97 °F (36.1 °C) (Temporal)   Resp 24   Ht 5' 4\" (1.626 m)   Wt 181 lb 10.5 oz (82.4 kg)   SpO2 100%   BMI 31.18 kg/m²        General appearance: Extubated, awake and definitely more alert than yesterday. Zelphia Tiffanie to open her eyes to commands, able to answer simple questions.     NG tube is in place.  Nasal cannula as well. HEENT: Pupils equal, round, and reactive to light. Conjunctivae/corneas clear.   Neck: Supple, with full range of motion. No jugular venous distention. Trachea midline. Respiratory:  Poor respiratory efforts.  The abdomen is breath sounds and basilar rhonchi. Cardiovascular: Regular rate and rhythm with normal S1/S2 without murmurs, rubs or gallops. Abdomen: Soft, non-tender, non-distended with normal bowel sounds. Musculoskeletal: No clubbing, cyanosis or edema bilaterally.  Full range of motion without deformity. Skin: Skin color, texture, turgor normal.  No rashes or lesions. Neuro: As stated above.  Advanced functional disability.  The patient is unable to lift up her legs against gravity. .    Labs:   Recent Labs      09/04/18   0501  09/05/18   0545  09/06/18   0553   WBC  9.6   --   7.9   HGB  9.3*  9.0*  9.6*   HCT  28.0*  26.5*  28.6*   PLT  47*   --   76*     Recent Labs      09/04/18   0501  09/05/18   0546  09/06/18   0551   NA  148*  149*  148*   K  3.5  3.7  4.2   CL  119*  115*  113*   CO2  23  24  26   BUN  22  20  15   CREATININE  0.96*  0.73  0.52   CALCIUM  8.4*  8.9  9.3   PHOS  2.7   --   3.3     Recent Labs      09/04/18   0501   AST  16   ALT  12   BILIDIR  <0.2   BILITOT  0.3   ALKPHOS  74     No results for input(s): INR in the last 72 hours. No results for input(s): Isabella Sneddon in the last 72 hours. Urinalysis:    Lab Results   Component Value Date    NITRU Negative 08/31/2018    WBCUA 10-20 08/31/2018    BACTERIA Few 08/31/2018    RBCUA 5-10 08/31/2018    BLOODU MODERATE 08/31/2018    SPECGRAV 1.016 08/31/2018    GLUCOSEU Negative 08/31/2018       Radiology:  MRI BRAIN WO CONTRAST   Final Result      NO ACUTE INTRACRANIAL PROCESS, OR SIGNIFICANT CHANGE FROM 5/10/2018 IDENTIFIED. XR CHEST PORTABLE   Final Result      XR CHEST PORTABLE   Final Result   STABLE CHEST. NO SIGNIFICANT CHANGE. XR CHEST PORTABLE   Final Result      Left lung base opacity compatible with infiltrate.  Linear right lung base opacity likely on the basis of atelectasis and/or infiltrate. XR CHEST PORTABLE   Final Result      Interval placement of endotracheal tube, terminating at the level of the clavicles. Enteric tube is present and abruptly turns proximal diaphragm likely due to the patient's hiatal hernia. Subsegmental atelectasis versus small infiltrate in both lung bases. XR CHEST PORTABLE   Final Result    IMPRESSION:       No acute intrathoracic process. XR CHEST PORTABLE   Final Result   1. DISTAL TIP OF RIGHT CENTRAL VENOUS LINE IS IN RIGHT SIDE OF THE NECK AND RIGHT CVC SHOULD BE REPOSITIONED. 2. NO EVIDENCE OF A PNEUMOTHORAX POST RIGHT CVC LINE PLACEMENT. 3. SUSPECT SUBSEGMENTAL ATELECTASIS OR PERHAPS DEVELOPING SMALL INFILTRATE IN LEFT LUNG BASE. 4. BORDERLINE CARDIOMEGALY AND ATHEROSCLEROTIC TORTUOUS AORTA. CT Head WO Contrast   Final Result   1. CEREBRAL ATROPHY AND AGE RELATED FINDINGS IN THE BRAIN. 2. NO ACUTE INTRA-AXIAL OR EXTRA-AXIAL FINDINGS IN THE BRAIN. 3. NO SIGNIFICANT CHANGE SINCE 5/14/2018 CT BRAIN. All CT scans at this facility use dose modulation, iterative reconstruction, and/or weight based dosing when appropriate to reduce radiation dose to as low as reasonably achievable. Assessment/Plan:    Active Hospital Problems    Diagnosis Date Noted    Gram negative sepsis ([facility] [A41.50]     UTI (urinary tract infection) [N39.0] 09/01/2018   Encephalopathy. The patient continues to be lethargic. No clinical evidence of meningitis.  significant improvement over the last 24 hours. As listed above.     Sepsis, secondary to UTI secondary to catheter and used a UTI.     Probable pneumonia.     Respiratory failure, extubated and then down to nasal cannula.     Thrombocytopenia, probably secondary to sepsis, less likely HIT.  Heparin induced anti platelets serotonin assay is still pending.  No evidence of active bleed.   Improving.     Hypotension secondary to a sepsis and septic

## 2018-09-07 LAB
ANION GAP SERPL CALCULATED.3IONS-SCNC: 8 MEQ/L (ref 7–13)
BASOPHILS ABSOLUTE: 0 K/UL (ref 0–0.2)
BASOPHILS RELATIVE PERCENT: 0.3 %
BUN BLDV-MCNC: 14 MG/DL (ref 8–23)
CALCIUM SERPL-MCNC: 8.8 MG/DL (ref 8.6–10.2)
CHLORIDE BLD-SCNC: 104 MEQ/L (ref 98–107)
CO2: 27 MEQ/L (ref 22–29)
CREAT SERPL-MCNC: 0.46 MG/DL (ref 0.5–0.9)
EOSINOPHILS ABSOLUTE: 0.2 K/UL (ref 0–0.7)
EOSINOPHILS RELATIVE PERCENT: 3.2 %
GFR AFRICAN AMERICAN: >60
GFR NON-AFRICAN AMERICAN: >60
GLUCOSE BLD-MCNC: 134 MG/DL (ref 60–115)
GLUCOSE BLD-MCNC: 144 MG/DL (ref 74–109)
GLUCOSE BLD-MCNC: 156 MG/DL (ref 60–115)
GLUCOSE BLD-MCNC: 161 MG/DL (ref 60–115)
HCT VFR BLD CALC: 26.9 % (ref 37–47)
HEMOGLOBIN: 8.9 G/DL (ref 12–16)
LYMPHOCYTES ABSOLUTE: 1 K/UL (ref 1–4.8)
LYMPHOCYTES RELATIVE PERCENT: 14.1 %
MCH RBC QN AUTO: 26.4 PG (ref 27–31.3)
MCHC RBC AUTO-ENTMCNC: 33.2 % (ref 33–37)
MCV RBC AUTO: 79.6 FL (ref 82–100)
MONOCYTES ABSOLUTE: 0.5 K/UL (ref 0.2–0.8)
MONOCYTES RELATIVE PERCENT: 7 %
NEUTROPHILS ABSOLUTE: 5.6 K/UL (ref 1.4–6.5)
NEUTROPHILS RELATIVE PERCENT: 75.4 %
PDW BLD-RTO: 21 % (ref 11.5–14.5)
PERFORMED ON: ABNORMAL
PLATELET # BLD: 89 K/UL (ref 130–400)
POTASSIUM SERPL-SCNC: 4.2 MEQ/L (ref 3.5–5.1)
RBC # BLD: 3.38 M/UL (ref 4.2–5.4)
SODIUM BLD-SCNC: 139 MEQ/L (ref 132–144)
WBC # BLD: 7.4 K/UL (ref 4.8–10.8)

## 2018-09-07 PROCEDURE — 92526 ORAL FUNCTION THERAPY: CPT

## 2018-09-07 PROCEDURE — 2580000003 HC RX 258: Performed by: NURSE PRACTITIONER

## 2018-09-07 PROCEDURE — G8987 SELF CARE CURRENT STATUS: HCPCS

## 2018-09-07 PROCEDURE — 85025 COMPLETE CBC W/AUTO DIFF WBC: CPT

## 2018-09-07 PROCEDURE — C9113 INJ PANTOPRAZOLE SODIUM, VIA: HCPCS | Performed by: INTERNAL MEDICINE

## 2018-09-07 PROCEDURE — G8989 SELF CARE D/C STATUS: HCPCS

## 2018-09-07 PROCEDURE — 6370000000 HC RX 637 (ALT 250 FOR IP): Performed by: INTERNAL MEDICINE

## 2018-09-07 PROCEDURE — 99232 SBSQ HOSP IP/OBS MODERATE 35: CPT | Performed by: INTERNAL MEDICINE

## 2018-09-07 PROCEDURE — 6360000002 HC RX W HCPCS: Performed by: INTERNAL MEDICINE

## 2018-09-07 PROCEDURE — G8988 SELF CARE GOAL STATUS: HCPCS

## 2018-09-07 PROCEDURE — 97535 SELF CARE MNGMENT TRAINING: CPT

## 2018-09-07 PROCEDURE — 97165 OT EVAL LOW COMPLEX 30 MIN: CPT

## 2018-09-07 PROCEDURE — 6370000000 HC RX 637 (ALT 250 FOR IP): Performed by: NURSE PRACTITIONER

## 2018-09-07 PROCEDURE — 2060000000 HC ICU INTERMEDIATE R&B

## 2018-09-07 PROCEDURE — 2700000000 HC OXYGEN THERAPY PER DAY

## 2018-09-07 PROCEDURE — 2580000003 HC RX 258: Performed by: INTERNAL MEDICINE

## 2018-09-07 PROCEDURE — 80048 BASIC METABOLIC PNL TOTAL CA: CPT

## 2018-09-07 PROCEDURE — 94640 AIRWAY INHALATION TREATMENT: CPT

## 2018-09-07 RX ORDER — FUROSEMIDE 40 MG/1
40 TABLET ORAL ONCE
Status: COMPLETED | OUTPATIENT
Start: 2018-09-07 | End: 2018-09-07

## 2018-09-07 RX ORDER — PANTOPRAZOLE SODIUM 40 MG/1
40 GRANULE, DELAYED RELEASE ORAL
Status: DISCONTINUED | OUTPATIENT
Start: 2018-09-08 | End: 2018-09-18 | Stop reason: HOSPADM

## 2018-09-07 RX ADMIN — Medication 10 ML: at 08:24

## 2018-09-07 RX ADMIN — Medication 500 MG: at 08:21

## 2018-09-07 RX ADMIN — Medication 10 ML: at 21:32

## 2018-09-07 RX ADMIN — MEMANTINE HYDROCHLORIDE 5 MG: 5 TABLET ORAL at 08:22

## 2018-09-07 RX ADMIN — FUROSEMIDE 40 MG: 40 TABLET ORAL at 16:13

## 2018-09-07 RX ADMIN — IPRATROPIUM BROMIDE AND ALBUTEROL SULFATE 1 AMPULE: 2.5; .5 SOLUTION RESPIRATORY (INHALATION) at 08:09

## 2018-09-07 RX ADMIN — CEFEPIME 1 G: 1 INJECTION, POWDER, FOR SOLUTION INTRAMUSCULAR; INTRAVENOUS at 08:23

## 2018-09-07 RX ADMIN — PANTOPRAZOLE SODIUM 40 MG: 40 INJECTION, POWDER, FOR SOLUTION INTRAVENOUS at 08:24

## 2018-09-07 RX ADMIN — CEFEPIME 1 G: 1 INJECTION, POWDER, FOR SOLUTION INTRAMUSCULAR; INTRAVENOUS at 01:30

## 2018-09-07 RX ADMIN — MINERAL SUPPLEMENT IRON 300 MG / 5 ML STRENGTH LIQUID 100 PER BOX UNFLAVORED 300 MG: at 08:22

## 2018-09-07 RX ADMIN — FOLIC ACID 1 MG: 1 TABLET ORAL at 08:21

## 2018-09-07 RX ADMIN — IPRATROPIUM BROMIDE AND ALBUTEROL SULFATE 1 AMPULE: 2.5; .5 SOLUTION RESPIRATORY (INHALATION) at 20:35

## 2018-09-07 RX ADMIN — DOCUSATE SODIUM 100 MG: 50 LIQUID ORAL at 08:22

## 2018-09-07 RX ADMIN — IPRATROPIUM BROMIDE AND ALBUTEROL SULFATE 1 AMPULE: 2.5; .5 SOLUTION RESPIRATORY (INHALATION) at 15:39

## 2018-09-07 RX ADMIN — ACETAMINOPHEN 650 MG: 325 TABLET ORAL at 08:22

## 2018-09-07 RX ADMIN — CEFEPIME 1 G: 1 INJECTION, POWDER, FOR SOLUTION INTRAMUSCULAR; INTRAVENOUS at 16:13

## 2018-09-07 ASSESSMENT — PAIN SCALES - PAIN ASSESSMENT IN ADVANCED DEMENTIA (PAINAD)
BREATHING: 0
BREATHING: 0
FACIALEXPRESSION: 0
CONSOLABILITY: 0
TOTALSCORE: 0
CONSOLABILITY: 0
BODYLANGUAGE: 0
FACIALEXPRESSION: 0
BREATHING: 0
TOTALSCORE: 0
NEGVOCALIZATION: 0
BREATHING: 0
CONSOLABILITY: 0
BREATHING: 0
NEGVOCALIZATION: 0
CONSOLABILITY: 0
BODYLANGUAGE: 0
NEGVOCALIZATION: 0
CONSOLABILITY: 0
FACIALEXPRESSION: 0
TOTALSCORE: 0
TOTALSCORE: 0
NEGVOCALIZATION: 0
NEGVOCALIZATION: 0
FACIALEXPRESSION: 0
BODYLANGUAGE: 0
FACIALEXPRESSION: 0
BREATHING: 0
TOTALSCORE: 0
BREATHING: 0
BODYLANGUAGE: 0
BODYLANGUAGE: 0
BREATHING: 0

## 2018-09-07 ASSESSMENT — PAIN SCALES - GENERAL
PAINLEVEL_OUTOF10: 0
PAINLEVEL_OUTOF10: 5

## 2018-09-07 NOTE — PROGRESS NOTES
Gram-negative sepsis Citrobacter and E. coli             More alert but confused open size asked questions          CITROBACTER KOSERI      Antibiotic Interpretation [facility] Unit   amoxicillin-clavulanate Resistant 4 mcg/mL   ceFAZolin Sensitive <=4 mcg/mL   cefepime Sensitive <=1 mcg/mL   ciprofloxacin Sensitive <=0.25 mcg/mL   gentamicin Sensitive <=1 mcg/mL   imipenem Sensitive <=0.25 mcg/mL   nitrofurantoin Sensitive 32 mcg/mL   trimethoprim-sulfamethoxazole Sensitive <=20 mcg/mL          ESCHERICHIA COLI      Antibiotic Interpretation [facility] Unit   ampicillin Sensitive <=2 mcg/mL   ceFAZolin Sensitive <=4 mcg/mL   ciprofloxacin Sensitive <=0.25 mcg/mL   gentamicin Sensitive <=1 mcg/mL   nitrofurantoin Sensitive <=16 mcg/mL   trimethoprim-sulfamethoxazole Sensitive <=20 mcg/mL           Review of Systems   Unable to perform ROS: Dementia           Physical Exam:    BP (!) 146/66   Pulse 100   Temp 97.2 °F (36.2 °C) (Oral)   Resp 16   Ht 5' 4\" (1.626 m)   Wt 181 lb 10.5 oz (82.4 kg)   SpO2 91%   BMI 31.18 kg/m²        Physical Exam   HENT:   Head: Normocephalic. Neck: Normal range of motion. No thyromegaly present. Cardiovascular: Normal heart sounds and intact distal pulses. No murmur heard. Pulmonary/Chest: She has no wheezes. She has no rales. She exhibits no tenderness. Abdominal: Soft. She exhibits no distension and no mass. There is no hepatosplenomegaly, splenomegaly or hepatomegaly. There is no tenderness. There is no rebound. Musculoskeletal: She exhibits edema. Skin: No rash noted.  No erythema.                    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

## 2018-09-07 NOTE — PROGRESS NOTES
PODIATRIC MEDICINE AND SURGERY  CONSULT PROGRESS NOTE      Opinion/advice regarding: right heel deep tissue injury  Staff Doctor:  Dr. Marcelo Gutierrez:  76 yo female with PMH of HTN, DM2, and Alzheimer dementia admitted for altered mental status and UTI. Podiatry consulted for evaluation of deep tissue injury to the right heel. Area on right heel is currently not open or draining. Will need to pad and protect area and monitor for changes     Plan:  Exam and evaluation  Deep tissue injury to medial right heel without active drainage or openings  Strict off-loading of heels while patient is in bed  Apply ABD pad and wrap to right foot and ankle to further pad and off load area  Antibiotics per ID   No need for I&D at this time  Discussed with Dr. Tasha Rivas  Will need follow up with Dr. Tasha Rivas in the wound center upon d/c      HPI: No acute events overnight. Not in ICU anymore. Still confused. Calm and resting in bed. No new complaints to feet or ankles today. No chest pain or SOB. No n/v/f/chills     Patient denies any fevers, chills, nausea, vomiting, chest pain or shortness of breath. Past Medical History:   Diagnosis Date    Alzheimer disease     Diabetes mellitus ([facility]     GERD (gastroesophageal reflux disease)     Hypertension     Osteoarthritis     Retinopathy        Past Surgical History:   Procedure Laterality Date    EYE SURGERY      HYSTERECTOMY      NE EGD TRANSORAL BIOPSY SINGLE/MULTIPLE N/A 1/21/2017    EGD BIOPSY performed by Jax Shell MD at [address] [place] [address] EGD TRANSORAL BIOPSY SINGLE/MULTIPLE N/A 6/7/2018    [facility]GD performed [facility] Jax Shell MD at [facility]       No current facility-administered medications on file prior to encounter.       Current Outpatient Prescriptions on File Prior to Encounter   Medication Sig Dispense Refill    folic acid (FOLVITE) 1 MG tablet Take 1 tablet by mouth daily 30 tablet 3    memantine (NAMENDA) 5 MG tablet Take 1 tablet by mouth daily 60 tablet 3    dextrose 5 % solution Infuse 100 mL/hr intravenously as needed (Low blood sugar)      hypromellose 0.4 % SOLN ophthalmic solution Place 1 drop into both eyes 2 times daily      sucralfate (CARAFATE) 1 GM/10ML suspension Take 1 g by mouth 3 times daily as needed      ferrous sulfate 325 (65 Fe) MG tablet Take 325 mg by mouth daily (with breakfast)      calcium carbonate 600 MG TABS tablet Take 1 tablet by mouth daily      acetaminophen (TYLENOL) 325 MG tablet Take 650 mg by mouth every 4 hours as needed      magnesium hydroxide (MILK OF MAGNESIA) 400 MG/5ML suspension Take 30 mLs by mouth every 4 hours as needed      divalproex (DEPAKOTE SPRINKLE) 125 MG capsule Take 125 mg by mouth daily      famotidine (PEPCID) 20 MG tablet Take 20 mg by mouth nightly      pantoprazole (PROTONIX) 40 MG tablet Take 1 tablet by mouth daily 30 tablet 0       No Known Allergies    History reviewed. No pertinent family history. Social History     Social History    Marital status: Single     Spouse name: N/A    Number of children: N/A    Years of education: N/A     Occupational History    Not on file. Social History Main Topics    Smoking status: Never Smoker    Smokeless tobacco: Never Used    Alcohol use No    Drug use: No    Sexual activity: Not on file     Other Topics Concern    Not on file     Social History Narrative    No narrative on file         REVIEW OF SYSTEMS:  See HPI      OBJECTIVE:  BP (!) 146/66   Pulse 100   Temp 97.2 °F (36.2 °C) (Oral)   Resp 16   Ht 5' 4\" (1.626 m)   Wt 181 lb 10.5 oz (82.4 kg)   SpO2 94%   BMI 31.18 kg/m²   Patient is alert and oriented x 3 in NAD.    N/v intact  No new wounds appreciated  Right heel deep tissue injury largely unchanged  No drainage noted, no breaks or openings in the skin      LABS:   Lab Results   Component Value Date    WBC 7.4 09/07/2018    HGB 8.9 (L) 09/07/2018    HCT 26.9 (L) 09/07/2018    MCV 79.6 (L) 09/07/2018    PLT 89 (L) 09/07/2018     Lab Results   Component Value Date     09/07/2018    K 4.2 09/07/2018    K 5.0 09/02/2018     09/07/2018    CO2 27 09/07/2018    BUN 14 09/07/2018    CREATININE 0.46 09/07/2018    GLUCOSE 144 09/07/2018    CALCIUM 8.8 09/07/2018      Lab Results   Component Value Date    LABALBU 2.1 (L) 09/04/2018     Lab Results   Component Value Date    SEDRATE 39 (H) 05/11/2018     No results found for: CRP  Lab Results   Component Value Date    LABA1C 5.4 01/16/2015     No results found for: EAG    MICROBIOLOGY:   No cultures of foot      IMAGING:   None      Patient's case will be discussed with staff, who will provide final recommendations. Thank you for the consult.     Guerda Tello PGY2  Please first page Podiatry On Call, [phone]  September 7, 2018  2:24 PM

## 2018-09-07 NOTE — PROGRESS NOTES
INPATIENT PROGRESS NOTES    PATIENT NAME: Faraz Jean  MRN: [id number]  SERVICE DATE:  September 7, 2018   SERVICE TIME:  12:56 PM      PRIMARY SERVICE: Pulmonary Disease    CHIEF COMPLAINTS: resp failure     INTERVAL HPI: Patient seen and examined at bedside, Interval Notes, orders reviewed. Nursing notes noted    Patient is calm, slightly confused, she reports no chest pain or shortness of breath, denies coughing, night uneventful, she is on 2 L O2 sat 96%, tolerating tube feed with minimal residuals, no nausea or vomiting, no diarrhea. Review of system:     GI Abdominal pain No  Skin Rash No    Social History   Substance Use Topics    Smoking status: Never Smoker    Smokeless tobacco: Never Used    Alcohol use No     History reviewed. No pertinent family history. OBJECTIVE    Body mass index is 31.18 kg/m². PHYSICAL EXAM:  Vitals:  BP (!) 146/66   Pulse 100   Temp 97.2 °F (36.2 °C) (Oral)   Resp 16   Ht 5' 4\" (1.626 m)   Wt 181 lb 10.5 oz (82.4 kg)   SpO2 91%   BMI 31.18 kg/m²     General: alert, cooperative, no distress  Head: normocephalic, atraumatic  Eyes:No gross abnormalities. ENT:  MMM no lesions, NG tube   Neck:  supple and no masses  Chest : Decreased air movement, with rales at the bases, no wheezing, tympanic, nontender. Heart[de-identified] Heart sounds are normal.  Regular rate and rhythm without murmur, gallop or rub. ABD:  symmetric, soft, non-tender  Musculoskeletal : no cyanosis, no clubbing and no edema  Neuro:  Grossly normal  Skin: No rashes or nodules noted.   Lymph node:  no cervical nodes  Urology: Yes Sánchez   Psychiatric: appropriate    DATA:   Recent Labs      09/06/18   0553  09/07/18   0542   WBC  7.9  7.4   HGB  9.6*  8.9*   HCT  28.6*  26.9*   MCV  79.7*  79.6*   PLT  76*  89*     Recent Labs      09/06/18   0551  09/07/18   0541   NA  148*  139   K  4.2  4.2   CL  113*  104   CO2  26  27   BUN  15  14   CREATININE  0.52  0.46*   GLUCOSE  85  144*   CALCIUM  9.3 8. 8   LABGLOM  >60.0  >60.0   GFRAA  >60.0  >60.0       MV Settings:     Vent Mode: CPAP  Vt Ordered: 400 mL  Rate Set: 16 bmp  FiO2 : 30 %  PEEP/CPAP: 5  Pressure Support: 5 cmH20  Peak Inspiratory Pressure: 13 cmH2O  Mean Airway Pressure: 6.9 cmH20  I:E Ratio: 1:3.20    No results for input(s): PHART, GTY8TMP, PO2ART, KHY6PXN, BEART, L9FJYPQQ in the last 72 hours. O2 Device: Nasal cannula  O2 Flow Rate (L/min): 2 L/min    DIET TUBE FEED CONTINUOUS/CYCLIC NPO; Diabetic; Nasogastric; Continuous; 45; 55; 24     MEDICATIONS during current hospitalization:    Continuous Infusions:   dextrose         Scheduled Meds:   ipratropium-albuterol  1 ampule Inhalation TID    ferrous sulfate  300 mg Oral Daily with breakfast    pantoprazole  40 mg Intravenous Daily    And    sodium chloride (PF)  10 mL Intravenous BID    cefepime  1 g Intravenous Q8H    docusate  100 mg Oral BID    insulin lispro  0-6 Units Subcutaneous 4 times per day    calcium elemental  500 mg Oral Daily    folic acid  1 mg Oral Daily    memantine  5 mg Oral Daily    sodium chloride flush  10 mL Intravenous 2 times per day       PRN Meds:albuterol, senna, guaiFENesin, acetaminophen, sodium chloride flush, magnesium hydroxide, ondansetron, glucose, dextrose, glucagon (rDNA), dextrose    Radiology  Ct Head Wo Contrast    Result Date: 9/1/2018  EXAM: CT SCAN OF THE BRAIN WITHOUT CONTRAST COMPARISON: 5/14/2018 REASONS FOR EXAMINATION:     DIABETES WITH ALTERED MENTAL STATUS TECHNIQUE:  CT brain is obtained without IV contrast agent. FINDINGS: An unenhanced CT scan of the brain demonstrates no evidence of a skull fracture. There is cerebral atrophy and age related findings in the brain. Mild patchy white matter hypoattenuation is likely a sequela of small vessel disease. There is no acute CVA. There is no acute intra-axial or extra-axial findings in the brain. There is no intracranial mass, hemorrhage, \"mass effect\" or midline shift.   The FINDINGS: There is borderline cardiomegaly. There is atherosclerotic tortuous aorta. The distal tip of the right central venous catheter is extending into the right side of the neck and presumably within the right internal jugular vein and the right CVC should be repositioned. There is no pneumothorax. There is subsegmental atelectasis or developing small streaky infiltrate in the left lung base. Rounded lucency superimposed on the left cardiac silhouette may very well represent a hiatal hernia. There is degenerative bone spurring throughout the spine. 1. DISTAL TIP OF RIGHT CENTRAL VENOUS LINE IS IN RIGHT SIDE OF THE NECK AND RIGHT CVC SHOULD BE REPOSITIONED. 2. NO EVIDENCE OF A PNEUMOTHORAX POST RIGHT CVC LINE PLACEMENT. 3. SUSPECT SUBSEGMENTAL ATELECTASIS OR PERHAPS DEVELOPING SMALL INFILTRATE IN LEFT LUNG BASE. 4. BORDERLINE CARDIOMEGALY AND ATHEROSCLEROTIC TORTUOUS AORTA. Xr Chest Portable    Result Date: 8/30/2018  EXAMINATION: CHEST PORTABLE VIEW  CLINICAL HISTORY: Short of breath COMPARISONS: July 22, 2018  FINDINGS: Single  views of the chest is submitted. Metallic density again overlying the cardiac silhouette. Unchanged The cardiac silhouette is of normal size configuration. The mediastinum is unremarkable. Pulmonary vascular unremarkable. Right sided trachea. No focal infiltrates. No Pneumothoraces. NO ACUTE ACTIVE CARDIOPULMONARY PROCESS    Mri Brain Wo Contrast    Result Date: 9/5/2018  MRI BRAIN WO CONTRAST : 9/5/2018 CLINICAL HISTORY:  Altered MS . COMPARISON: Head CT done 118, and head MRI 5/10/2018. TECHNIQUE: Multiplanar MR imaging of the head was performed without contrast. FINDINGS: There is no infarct, intracranial hemorrhage, mass effect, midline shift, extra-axial collection, or hydrocephalus.    Moderate generalized cerebral atrophy and mild supratentorial white matter changes are present, most

## 2018-09-07 NOTE — PROGRESS NOTES
Romberg:  normal            Reflexes:             Deep Tendon Reflexes:             Reflexes are 2 +             Plantar response:                Right:  downgoing               Left:  downgoing    Vascular:  Cardiac Exam:  normal         Ct Head Wo Contrast    Result Date: 9/1/2018  EXAM: CT SCAN OF THE BRAIN WITHOUT CONTRAST COMPARISON: 5/14/2018 REASONS FOR EXAMINATION:     DIABETES WITH ALTERED MENTAL STATUS TECHNIQUE:  CT brain is obtained without IV contrast agent. FINDINGS: An unenhanced CT scan of the brain demonstrates no evidence of a skull fracture. There is cerebral atrophy and age related findings in the brain. Mild patchy white matter hypoattenuation is likely a sequela of small vessel disease. There is no acute CVA. There is no acute intra-axial or extra-axial findings in the brain. There is no intracranial mass, hemorrhage, \"mass effect\" or midline shift. The remainder of the CT scan of the brain appears unremarkable. No significant change since the 5/14/2018 CT brain. 1. CEREBRAL ATROPHY AND AGE RELATED FINDINGS IN THE BRAIN. 2. NO ACUTE INTRA-AXIAL OR EXTRA-AXIAL FINDINGS IN THE BRAIN. 3. NO SIGNIFICANT CHANGE SINCE 5/14/2018 CT BRAIN. All CT scans at this facility use dose modulation, iterative reconstruction, and/or weight based dosing when appropriate to reduce radiation dose to as low as reasonably achievable. Xr Chest Portable    Result Date: 9/1/2018  Portable chest radiograph History: Intubation. Technique: AP portable view of the chest obtained. Comparison: Chest radiograph from September 1, 2018, 1045 hours Findings: Interval placement of an endotracheal tube, its tip terminating at the level of the clavicles. Interval placement of an enteric tube, which abruptly curves just proximal to the diaphragm likely secondary to the patient's hiatal hernia. Atherosclerotic ossification of the thoracic aorta. Continued mediastinal silhouette is within normal limits.  Patchy bilateral opacities within each lung base. A pneumothorax or pleural effusion. Interval placement of endotracheal tube, terminating at the level of the clavicles. Enteric tube is present and abruptly turns proximal diaphragm likely due to the patient's hiatal hernia. Subsegmental atelectasis versus small infiltrate in both lung bases. Xr Chest Portable    Result Date: 9/1/2018  Portable chest radiograph History: Shortness of breath Technique: AP portable view of the chest obtained. Comparison: Chest x-ray from August 30, 2018 Findings: Atherosclerotic calcification of the thoracic aorta. Surgical clips project over the cardiac silhouette. The cardiomediastinal silhouette is within normal limits. No pneumothorax, pleural effusion, or focal consolidation. Osseous structures of the thorax  are intact. IMPRESSION: No acute intrathoracic process. Xr Chest Portable    Result Date: 9/1/2018  EXAMINATION: PORTABLE CHEST X-RAY FROM 9/1/2018 AT 10:45 HOURS CLINICAL HISTORY: CHECK RIGHT CENTRAL VENOUS LINE PLACEMENT COMPARISONS: 8/31/2018 FINDINGS: There is borderline cardiomegaly. There is atherosclerotic tortuous aorta. The distal tip of the right central venous catheter is extending into the right side of the neck and presumably within the right internal jugular vein and the right CVC should be repositioned. There is no pneumothorax. There is subsegmental atelectasis or developing small streaky infiltrate in the left lung base. Rounded lucency superimposed on the left cardiac silhouette may very well represent a hiatal hernia. There is degenerative bone spurring throughout the spine. 1. DISTAL TIP OF RIGHT CENTRAL VENOUS LINE IS IN RIGHT SIDE OF THE NECK AND RIGHT CVC SHOULD BE REPOSITIONED. 2. NO EVIDENCE OF A PNEUMOTHORAX POST RIGHT CVC LINE PLACEMENT. 3. SUSPECT SUBSEGMENTAL ATELECTASIS OR PERHAPS DEVELOPING SMALL INFILTRATE IN LEFT LUNG BASE.  4. BORDERLINE CARDIOMEGALY AND ATHEROSCLEROTIC TORTUOUS

## 2018-09-07 NOTE — PROGRESS NOTES
Physical Therapy Med Surg Daily Treatment Note  Facility/Department: [address] [place] [address]  Room: [place] [address]       NAME: Dmitri Workman  : 1945 (75 y.o.)  MRN: [id number]  CODE STATUS: Full Code    Date of Service: 2018    Patient Diagnosis(es): UTI (urinary tract infection) [N39.0]   Chief Complaint   Patient presents with    Illness     lethargic x2days, vomit bile, ls rhonchi and crackles per NH, was seen yesterday for same c/o     Patient Active Problem List    Diagnosis Date Noted    Gram negative sepsis ([address]     UTI (urinary tract infection) 2018    MALA (acute kidney injury) ([address] 2018    Coffee ground emesis 2018    Aspiration pneumonia of left lower lobe due to vomit ([address]     Encephalopathy     Hypothermia     E. 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

## 2018-09-07 NOTE — PROGRESS NOTES
compressive devices.  No pharmacological measures due to her thrombocytopenia. Dysphagia. Speech and swallow therapist recommended nothing by mouth and the PEG tube. Continue intravenous antibiotic. Continue tube feed. Continue PT OT. I discussed the feeding tube with the daughter at the bedside. The daughter will discuss the need for a PEG tube with her siblings and get back to me within next 24-48 hours.

## 2018-09-07 NOTE — CARE COORDINATION
RN and physician aware KAYY needs completed to send for a level of care. Per Jeff Perea, the pt ran out of bed hold days July 23rd. Pt has a passar that was completed in 2016 per Raciel Reach. Electronically signed by VERENA Hogan on 9/7/18 at 10:45 AM    RN to complete KAYY this afternoon. LSW to fax for a level of care this afternoon. Electronically signed by VERENA Hogan on 9/7/18 at 12:39 PM    Await KAYY to be completed by RN and will send for level of care. No discharge currently for the pt.  Electronically signed by VERENA Hogan on 9/7/18 at 4:19 PM

## 2018-09-07 NOTE — PROGRESS NOTES
functional mobility , Decreased ADL status, Decreased safe awareness, Decreased strength, Decreased endurance, Decreased balance, Decreased cognition, Decreased ROM  Prognosis: Fair  Discharge Recommendations: Subacute/Skilled Nursing Facility  History: multiple  Exam: greater than 5  Assistance / Modification: Dependent    Prognosis:  [] Good   [x]Fair   [] Poor     Barriers to Improvement:  Cognition,ROM,strength    Six Click Score  How much help for putting on and taking off regular lower body clothing?: Total  How much help for Bathing?: Total  How much help for Toileting?: Total  How much help for putting on and taking off regular upper body clothing?: Total  How much help for taking care of personal grooming?: Total  How much help for eating meals?: Total  AM-PAC Inpatient Daily Activity Raw Score: 6  AM-PAC Inpatient ADL T-Scale Score : 17.07  ADL Inpatient CMS 0-100% Score: 100    Recommended DME:  [] W/W   [] Cane   [] Rollator   [] W/C   [] Grab Bars  [] Shower Chair   []Dressing AD []  BSC  [] Other:    Plan: ,  N/A    G-Codes:  OT G-codes  Functional Limitation: Self care  Self Care Current Status (): 100 percent impaired, limited or restricted  Self Care Goal Status (): 100 percent impaired, limited or restricted  Self Care Discharge Status (): 100 percent impaired, limited or restricted        Time in:  1430  Time out:  1450  Timed treatment minutes:  20  Total treatment time/minutes:  20    Electronically signed by:    CONOR Chavarria  8/2/6183, 2:51 PM Electronically signed by CONOR Chavarria on 8/1/99 at 2:56 PM

## 2018-09-08 LAB
GLUCOSE BLD-MCNC: 118 MG/DL (ref 60–115)
GLUCOSE BLD-MCNC: 137 MG/DL (ref 60–115)
GLUCOSE BLD-MCNC: 145 MG/DL (ref 60–115)
GLUCOSE BLD-MCNC: 147 MG/DL (ref 60–115)
PERFORMED ON: ABNORMAL

## 2018-09-08 PROCEDURE — 6370000000 HC RX 637 (ALT 250 FOR IP): Performed by: INTERNAL MEDICINE

## 2018-09-08 PROCEDURE — 6370000000 HC RX 637 (ALT 250 FOR IP): Performed by: NURSE PRACTITIONER

## 2018-09-08 PROCEDURE — 2580000003 HC RX 258: Performed by: INTERNAL MEDICINE

## 2018-09-08 PROCEDURE — 6360000002 HC RX W HCPCS: Performed by: INTERNAL MEDICINE

## 2018-09-08 PROCEDURE — 51702 INSERT TEMP BLADDER CATH: CPT

## 2018-09-08 PROCEDURE — 2580000003 HC RX 258: Performed by: NURSE PRACTITIONER

## 2018-09-08 PROCEDURE — 2060000000 HC ICU INTERMEDIATE R&B

## 2018-09-08 PROCEDURE — 2700000000 HC OXYGEN THERAPY PER DAY

## 2018-09-08 PROCEDURE — 99232 SBSQ HOSP IP/OBS MODERATE 35: CPT | Performed by: INTERNAL MEDICINE

## 2018-09-08 PROCEDURE — 94640 AIRWAY INHALATION TREATMENT: CPT

## 2018-09-08 RX ADMIN — Medication 10 ML: at 00:01

## 2018-09-08 RX ADMIN — INSULIN LISPRO 1 UNITS: 100 INJECTION, SOLUTION INTRAVENOUS; SUBCUTANEOUS at 00:56

## 2018-09-08 RX ADMIN — IPRATROPIUM BROMIDE AND ALBUTEROL SULFATE 1 AMPULE: 2.5; .5 SOLUTION RESPIRATORY (INHALATION) at 12:23

## 2018-09-08 RX ADMIN — CEFEPIME 1 G: 1 INJECTION, POWDER, FOR SOLUTION INTRAMUSCULAR; INTRAVENOUS at 15:47

## 2018-09-08 RX ADMIN — CEFEPIME 1 G: 1 INJECTION, POWDER, FOR SOLUTION INTRAMUSCULAR; INTRAVENOUS at 08:47

## 2018-09-08 RX ADMIN — IPRATROPIUM BROMIDE AND ALBUTEROL SULFATE 1 AMPULE: 2.5; .5 SOLUTION RESPIRATORY (INHALATION) at 07:55

## 2018-09-08 RX ADMIN — MINERAL SUPPLEMENT IRON 300 MG / 5 ML STRENGTH LIQUID 100 PER BOX UNFLAVORED 300 MG: at 08:48

## 2018-09-08 RX ADMIN — MEMANTINE HYDROCHLORIDE 5 MG: 5 TABLET ORAL at 08:48

## 2018-09-08 RX ADMIN — Medication 10 ML: at 22:35

## 2018-09-08 RX ADMIN — Medication 500 MG: at 08:48

## 2018-09-08 RX ADMIN — IPRATROPIUM BROMIDE AND ALBUTEROL SULFATE 1 AMPULE: 2.5; .5 SOLUTION RESPIRATORY (INHALATION) at 19:21

## 2018-09-08 RX ADMIN — FOLIC ACID 1 MG: 1 TABLET ORAL at 08:48

## 2018-09-08 RX ADMIN — Medication 10 ML: at 00:00

## 2018-09-08 RX ADMIN — Medication 10 ML: at 08:48

## 2018-09-08 RX ADMIN — PANTOPRAZOLE SODIUM 40 MG: 40 GRANULE, DELAYED RELEASE ORAL at 06:46

## 2018-09-08 RX ADMIN — CEFEPIME 1 G: 1 INJECTION, POWDER, FOR SOLUTION INTRAMUSCULAR; INTRAVENOUS at 00:00

## 2018-09-08 RX ADMIN — DOCUSATE SODIUM 100 MG: 50 LIQUID ORAL at 08:48

## 2018-09-08 ASSESSMENT — PAIN SCALES - GENERAL
PAINLEVEL_OUTOF10: 0

## 2018-09-08 NOTE — PROGRESS NOTES
Patient is somewhat lethargic Today. Her eyes open but unable or unwilling to answering the questions or follow any commands. I spoke to the bedside nurse did not report any other unusual symptoms such as a fever, vomiting, diarrhea, confusion or seizure. Patient is unable to provide any additional information given her mentation. HEENT: Pupils equal, round, and reactive to light. Conjunctivae/corneas clear. Neck: Supple, with full range of motion. No jugular venous distention. Trachea midline. Respiratory:  Poor respiratory efforts.  No wheezing and no rhonchi. Cardiovascular: Regular rate and rhythm with normal S1/S2 without murmurs, rubs or gallops. Abdomen: Soft, non-tender, non-distended with normal bowel sounds. Musculoskeletal: No clubbing, cyanosis or edema bilaterally.  Full range of motion without deformity. Skin: Skin color, texture, turgor normal.  No rashes or lesions. Neuro: As stated above.  Advanced functional and cognitive disability disability.  The patient is unable to lift up her legs against gravity. . Eyes open, lethargic today.         Sepsis, secondary to UTI secondary to catheter and used a UTI.     Aspiration versus healthcare associated pneumonia.     Respiratory failure, extubated and then down to nasal cannula.     Thrombocytopenia, probably secondary to sepsis, less likely HIT.  Heparin induced anti platelets serotonin assay is still pending.  No evidence of active bleed.  Improving.     Hypotension secondary to a sepsis and septic shock.  Improved over the last 48 hours.  Resolved.     Anemia, drop of hemoglobin from 11-7.  No evidence of active bleed or blood loss however I cannot exclude the possibility of our card to GI bleed.     Acute kidney failure secondary to above.  Resolved.     Nutrition, tube feed the tolerated per nursing staff.     Hypernatremia, hypokalemia, hypomagnesemia, intravenous infusion.  Most are corrected except the sodium I will start D5 water for 500 mL only.     DVT prophylaxis, sequential compressive devices.  No pharmacological measures due to her thrombocytopenia.     Dysphagia. Speech and swallow therapist recommended nothing by mouth and the PEG tube.     Continue intravenous antibiotic. Continue tube feed. Continue PT OT. I discussed the feeding tube with the daughter at the bedside.   The daughter

## 2018-09-08 NOTE — PROGRESS NOTES
diaphragm likely secondary to the patient's hiatal hernia. Atherosclerotic ossification of the thoracic aorta. Continued mediastinal silhouette is within normal limits. Patchy bilateral opacities within each lung base. A pneumothorax or pleural effusion. Interval placement of endotracheal tube, terminating at the level of the clavicles. Enteric tube is present and abruptly turns proximal diaphragm likely due to the patient's hiatal hernia. Subsegmental atelectasis versus small infiltrate in both lung bases. Xr Chest Portable    Result Date: 9/1/2018  Portable chest radiograph History: Shortness of breath Technique: AP portable view of the chest obtained. Comparison: Chest x-ray from August 30, 2018 Findings: Atherosclerotic calcification of the thoracic aorta. Surgical clips project over the cardiac silhouette. The cardiomediastinal silhouette is within normal limits. No pneumothorax, pleural effusion, or focal consolidation. Osseous structures of the thorax  are intact. IMPRESSION: No acute intrathoracic process. Xr Chest Portable    Result Date: 9/1/2018  EXAMINATION: PORTABLE CHEST X-RAY FROM 9/1/2018 AT 10:45 HOURS CLINICAL HISTORY: CHECK RIGHT CENTRAL VENOUS LINE PLACEMENT COMPARISONS: 8/31/2018 FINDINGS: There is borderline cardiomegaly. There is atherosclerotic tortuous aorta. The distal tip of the right central venous catheter is extending into the right side of the neck and presumably within the right internal jugular vein and the right CVC should be repositioned. There is no pneumothorax. There is subsegmental atelectasis or developing small streaky infiltrate in the left lung base. Rounded lucency superimposed on the left cardiac silhouette may very well represent a hiatal hernia. There is degenerative bone spurring throughout the spine. 1. DISTAL TIP OF RIGHT CENTRAL VENOUS LINE IS IN RIGHT SIDE OF THE NECK AND RIGHT CVC SHOULD BE REPOSITIONED.  2. NO EVIDENCE OF A PNEUMOTHORAX POST

## 2018-09-09 LAB
ANION GAP SERPL CALCULATED.3IONS-SCNC: 10 MEQ/L (ref 7–13)
BUN BLDV-MCNC: 19 MG/DL (ref 8–23)
CALCIUM SERPL-MCNC: 9 MG/DL (ref 8.6–10.2)
CHLORIDE BLD-SCNC: 102 MEQ/L (ref 98–107)
CO2: 31 MEQ/L (ref 22–29)
CREAT SERPL-MCNC: 0.58 MG/DL (ref 0.5–0.9)
GFR AFRICAN AMERICAN: >60
GFR NON-AFRICAN AMERICAN: >60
GLUCOSE BLD-MCNC: 104 MG/DL (ref 60–115)
GLUCOSE BLD-MCNC: 121 MG/DL (ref 60–115)
GLUCOSE BLD-MCNC: 122 MG/DL (ref 60–115)
GLUCOSE BLD-MCNC: 128 MG/DL (ref 60–115)
GLUCOSE BLD-MCNC: 129 MG/DL (ref 60–115)
GLUCOSE BLD-MCNC: 134 MG/DL (ref 74–109)
GLUCOSE BLD-MCNC: 145 MG/DL (ref 60–115)
HCT VFR BLD CALC: 27.1 % (ref 37–47)
HEMOGLOBIN: 9 G/DL (ref 12–16)
MAGNESIUM: 1.9 MG/DL (ref 1.7–2.3)
MCH RBC QN AUTO: 26.5 PG (ref 27–31.3)
MCHC RBC AUTO-ENTMCNC: 33.3 % (ref 33–37)
MCV RBC AUTO: 79.7 FL (ref 82–100)
PDW BLD-RTO: 22.2 % (ref 11.5–14.5)
PERFORMED ON: ABNORMAL
PERFORMED ON: NORMAL
PLATELET # BLD: 163 K/UL (ref 130–400)
POTASSIUM SERPL-SCNC: 4.2 MEQ/L (ref 3.5–5.1)
RBC # BLD: 3.39 M/UL (ref 4.2–5.4)
SODIUM BLD-SCNC: 143 MEQ/L (ref 132–144)
WBC # BLD: 5.1 K/UL (ref 4.8–10.8)

## 2018-09-09 PROCEDURE — 92526 ORAL FUNCTION THERAPY: CPT

## 2018-09-09 PROCEDURE — 1210000000 HC MED SURG R&B

## 2018-09-09 PROCEDURE — 6360000002 HC RX W HCPCS: Performed by: INTERNAL MEDICINE

## 2018-09-09 PROCEDURE — 6370000000 HC RX 637 (ALT 250 FOR IP): Performed by: INTERNAL MEDICINE

## 2018-09-09 PROCEDURE — 85027 COMPLETE CBC AUTOMATED: CPT

## 2018-09-09 PROCEDURE — 99232 SBSQ HOSP IP/OBS MODERATE 35: CPT | Performed by: INTERNAL MEDICINE

## 2018-09-09 PROCEDURE — 2580000003 HC RX 258: Performed by: INTERNAL MEDICINE

## 2018-09-09 PROCEDURE — 83735 ASSAY OF MAGNESIUM: CPT

## 2018-09-09 PROCEDURE — 6370000000 HC RX 637 (ALT 250 FOR IP): Performed by: NURSE PRACTITIONER

## 2018-09-09 PROCEDURE — 80048 BASIC METABOLIC PNL TOTAL CA: CPT

## 2018-09-09 PROCEDURE — 2580000003 HC RX 258: Performed by: NURSE PRACTITIONER

## 2018-09-09 RX ORDER — IPRATROPIUM BROMIDE AND ALBUTEROL SULFATE 2.5; .5 MG/3ML; MG/3ML
1 SOLUTION RESPIRATORY (INHALATION) EVERY 4 HOURS PRN
Status: DISCONTINUED | OUTPATIENT
Start: 2018-09-09 | End: 2018-09-18 | Stop reason: HOSPADM

## 2018-09-09 RX ADMIN — ENOXAPARIN SODIUM 40 MG: 40 INJECTION SUBCUTANEOUS at 22:28

## 2018-09-09 RX ADMIN — Medication 10 ML: at 22:29

## 2018-09-09 RX ADMIN — MEMANTINE HYDROCHLORIDE 5 MG: 5 TABLET ORAL at 11:04

## 2018-09-09 RX ADMIN — MINERAL SUPPLEMENT IRON 300 MG / 5 ML STRENGTH LIQUID 100 PER BOX UNFLAVORED 300 MG: at 11:03

## 2018-09-09 RX ADMIN — CEFEPIME 1 G: 1 INJECTION, POWDER, FOR SOLUTION INTRAMUSCULAR; INTRAVENOUS at 18:50

## 2018-09-09 RX ADMIN — CEFEPIME 1 G: 1 INJECTION, POWDER, FOR SOLUTION INTRAMUSCULAR; INTRAVENOUS at 11:04

## 2018-09-09 RX ADMIN — DOCUSATE SODIUM 100 MG: 50 LIQUID ORAL at 22:28

## 2018-09-09 RX ADMIN — Medication 500 MG: at 11:04

## 2018-09-09 RX ADMIN — FOLIC ACID 1 MG: 1 TABLET ORAL at 11:04

## 2018-09-09 RX ADMIN — Medication 10 ML: at 00:44

## 2018-09-09 RX ADMIN — DOCUSATE SODIUM 100 MG: 50 LIQUID ORAL at 11:03

## 2018-09-09 RX ADMIN — CEFEPIME 1 G: 1 INJECTION, POWDER, FOR SOLUTION INTRAMUSCULAR; INTRAVENOUS at 00:44

## 2018-09-09 RX ADMIN — PANTOPRAZOLE SODIUM 40 MG: 40 GRANULE, DELAYED RELEASE ORAL at 11:03

## 2018-09-09 NOTE — FLOWSHEET NOTE
She opened her eyes but was not talking as she did in the past two nights, no rhonchi , weaned off oxygen pulse ox 99%,tolerating her tube feeding well,5ml. Residual return,ngt intact at length 60.her abdomen is soft with active bowel sounds.

## 2018-09-09 NOTE — PLAN OF CARE
Problem: Risk for Impaired Skin Integrity  Goal: Tissue integrity - skin and mucous membranes  Structural intactness and normal physiological function of skin and  mucous membranes. Outcome: Ongoing      Problem: Nutrition  Goal: Optimal nutrition therapy  Nutrition Problem: Inadequate oral intake  Intervention: Food and/or Nutrient Delivery: Modify current Tube Feeding  Nutritional Goals: EN To Provide > 85% of Esimated Nutrient Needs. Na- Wnl. BG < 160. + BM.      Outcome: Ongoing      Problem: Restraint Use - Nonviolent/Non-Self-Destructive Behavior:  Goal: Absence of restraint indications  Absence of restraint indications   Outcome: Ongoing    Goal: Absence of restraint-related injury  Absence of restraint-related injury   Outcome: Ongoing      Problem: Falls - Risk of:  Goal: Will remain free from falls  Will remain free from falls   Outcome: Met This Shift    Goal: Absence of physical injury  Absence of physical injury   Outcome: Ongoing      Problem: IP SWALLOWING  Goal: LTG - patient will tolerate the least restrictive diet consistency to allow for safe consumption of daily meals  Outcome: Ongoing      Problem: IP BALANCE  Goal: LTG - Patient will maintain balance to allow for safe/functional mobility  Outcome: Ongoing

## 2018-09-09 NOTE — PROGRESS NOTES
09/09/2018    CREATININE 0.58 09/09/2018    GLUCOSE 134 09/09/2018    CALCIUM 9.0 09/09/2018      Lab Results   Component Value Date    LABALBU 2.1 (L) 09/04/2018     Lab Results   Component Value Date    SEDRATE 39 (H) 05/11/2018     No results found for: CRP  Lab Results   Component Value Date    LABA1C 5.4 01/16/2015     No results found for: EAG    MICROBIOLOGY:   No cultures of foot      IMAGING:   None      Patient's case will be discussed with staff, who will provide final recommendations. Thank you for the consult.     Brooklyn Patricio, PGY2  Please first page [facility], [phone]  September 9, 2018  2:30 PM

## 2018-09-09 NOTE — FLOWSHEET NOTE
Patient right heel dressed with ABD and wrap with kerlix,a large mepilex applied to her sacrum ,the sacral wound has no foul odor emitted. she is non-verbal at this time,no moaning when she was turned . Right groin tripple lumen is intact. the white port is slightly resistant to flush,while the two ports have excellent blood return and flushed easily. her NGT remain at line 60.she is tolerating Tube feeding well,residual return less than 5ml.

## 2018-09-09 NOTE — CARE COORDINATION
PATIENT D/C PLAN IS TO GO BACK TO [facility]. PER LSW PATIENT WILL REQUIRE A LOC. NEED KAYY FILLED OUT BY NURSING TO START THIS ON Monday. LSW TO F/U Monday.

## 2018-09-09 NOTE — PROGRESS NOTES
Neurology Follow up    SUBJECTIVE:   Patient is somewhat somnolent. Followed simple commands off and on. Moving her extremities minimally. Movements minimal in the lower extremities. Vital Sign Statistics (last 24 hours)     [address] Name Min Max   Temp 97.2 °F (36.2 °C) 98.2 °F (36.8 °C)   Pulse 70 100   Resp 16 18   BP: Systolic 62 492   BP: Diastolic 42 66   SpO2 94 % 100 %   Intake/Output      09/07/18 0701 - 09/08/18 0700    [id number] Total   Intake (ml) -- -- -- --   Output (ml) -- 900 -- 900   Net (ml) -- -900 -- -900            Mental Status Exam:             Level of Alertness:   awake            Orientation:   person, place, time                      Attention/Concentration:  normal            Language:  normal    Cranial Nerves        Cranial nerve II           Visual acuity:  normal           Visual fields:  normal      Cranial nerve III           Pupils:  equal, round, reactive to light      Cranial nerves III, IV, VI           Extraocular Movements: intact      Cranial nerve V           Facial sensation:  intact      Cranial nerve VII           Facial strength: intact      Cranial nerve VIII           Hearing:  intact      Cranial nerve IX           Palate:  intact      Cranial nerve XI         Shoulder shrug:  intact      Cranial nerve XII          Tongue movement:  normal    Motor:    Drift:  absent  Motor exam is 3/5 throughout arms and legs however still has wrist restraints, likely when more awake she is stronger than on my exam  Tone:  normal  Abnormal Movements:  absent            Sensory: grimaces to noxious stim in all fours          Coordination:  Cannot be tested d/t difficulty following commands.      Reflexes:             Deep Tendon Reflexes:             Reflexes are 2 +             Plantar response:                Right:  downgoing               Left:  downgoing    Vascular:  Cardiac Exam:  normal         Ct Head Wo Contrast    Result Date: 9/1/2018  EXAM: CT SCAN OF THE BRAIN WITHOUT CONTRAST COMPARISON: 5/14/2018 REASONS FOR EXAMINATION:     DIABETES WITH ALTERED MENTAL STATUS TECHNIQUE:  CT brain is obtained without IV contrast agent. FINDINGS: An unenhanced CT scan of the brain demonstrates no evidence of a skull fracture. There is cerebral atrophy and age related findings in the brain. Mild patchy white matter hypoattenuation is likely a sequela of small vessel disease. There is no acute CVA. There is no acute intra-axial or extra-axial findings in the brain. There is no intracranial mass, hemorrhage, \"mass effect\" or midline shift. The remainder of the CT scan of the brain appears unremarkable. No significant change since the 5/14/2018 CT brain. 1. CEREBRAL ATROPHY AND AGE RELATED FINDINGS IN THE BRAIN. 2. NO ACUTE INTRA-AXIAL OR EXTRA-AXIAL FINDINGS IN THE BRAIN. 3. NO SIGNIFICANT CHANGE SINCE 5/14/2018 CT BRAIN. All CT scans at this facility use dose modulation, iterative reconstruction, and/or weight based dosing when appropriate to reduce radiation dose to as low as reasonably achievable. Xr Chest Portable    Result Date: 9/1/2018  Portable chest radiograph History: Intubation. Technique: AP portable view of the chest obtained. Comparison: Chest radiograph from September 1, 2018, 1045 hours Findings: Interval placement of an endotracheal tube, its tip terminating at the level of the clavicles. Interval placement of an enteric tube, which abruptly curves just proximal to the diaphragm likely secondary to the patient's hiatal hernia. Atherosclerotic ossification of the thoracic aorta. Continued mediastinal silhouette is within normal limits. Patchy bilateral opacities within each lung base. A pneumothorax or pleural effusion. Interval placement of endotracheal tube, terminating at the level of the clavicles. Enteric tube is present and abruptly turns proximal diaphragm likely due to the patient's hiatal hernia.  Subsegmental atelectasis versus small infiltrate in both lung bases. Xr Chest Portable    Result Date: 9/1/2018  Portable chest radiograph History: Shortness of breath Technique: AP portable view of the chest obtained. Comparison: Chest x-ray from August 30, 2018 Findings: Atherosclerotic calcification of the thoracic aorta. Surgical clips project over the cardiac silhouette. The cardiomediastinal silhouette is within normal limits. No pneumothorax, pleural effusion, or focal consolidation. Osseous structures of the thorax  are intact. IMPRESSION: No acute intrathoracic process. Xr Chest Portable    Result Date: 9/1/2018  EXAMINATION: PORTABLE CHEST X-RAY FROM 9/1/2018 AT 10:45 HOURS CLINICAL HISTORY: CHECK RIGHT CENTRAL VENOUS LINE PLACEMENT COMPARISONS: 8/31/2018 FINDINGS: There is borderline cardiomegaly. There is atherosclerotic tortuous aorta. The distal tip of the right central venous catheter is extending into the right side of the neck and presumably within the right internal jugular vein and the right CVC should be repositioned. There is no pneumothorax. There is subsegmental atelectasis or developing small streaky infiltrate in the left lung base. Rounded lucency superimposed on the left cardiac silhouette may very well represent a hiatal hernia. There is degenerative bone spurring throughout the spine. 1. DISTAL TIP OF RIGHT CENTRAL VENOUS LINE IS IN RIGHT SIDE OF THE NECK AND RIGHT CVC SHOULD BE REPOSITIONED. 2. NO EVIDENCE OF A PNEUMOTHORAX POST RIGHT CVC LINE PLACEMENT. 3. SUSPECT SUBSEGMENTAL ATELECTASIS OR PERHAPS DEVELOPING SMALL INFILTRATE IN LEFT LUNG BASE. 4. BORDERLINE CARDIOMEGALY AND ATHEROSCLEROTIC TORTUOUS AORTA. Recent Labs      09/07/18   0542  09/09/18   0600   WBC  7.4  5.1   HGB  8.9*  9.0*   PLT  89*  163     No results for input(s): BILITOT, ALKPHOS, AST, ALT in the last 72 hours.   Lab Results   Component Value Date    PROTIME 11.1 08/31/2018    INR 1.1 08/31/2018     Lab Results   Component Value Date    VALPROATE 9.7 08/17/2016       ASSESSMENT AND PLAN    Encephalopathy, metabolic/infective from urosepsis, had alfaro. Pneumonia with resolved respiratory failure that had required intubation. Had HIT, platelet count recovering. Acute anemia improving. Renal function improving   MRI, no new stroke but shows significant bilateral hippocampal atrophy with moderate global atorphy. Mixed Alzheimer's and vascular dementia  Continue memantine 5 mg  Edentulous and very dysarthric  To continue the supportive measures  Needs outpatient neurology follow up for Alzheimer's disease.

## 2018-09-09 NOTE — PROGRESS NOTES
[facility] Respiratory Therapy Evaluation   Current Order:  Duoneb TID and albuterol q2 PRN       Home Regimen: None       Ordering Physician: Melyssa Teran   Re-evaluation Date:  Re-eval 9/12/18     Diagnosis: UTI       Patient Status: Stable   The following MDI Criteria must be met in order to convert aerosol to MDI with spacer. If unable to meet, MDI will be converted to aerosol:  []  Patient able to demonstrate the ability to use MDI effectively  []  Patient alert and cooperative  []  Patient able to take deep breath with 5-10 second hold  []  Medication(s) available in this delivery method   []  Peak flow greater than or equal to 200 ml/min            Current Order Substituted To  (same drug, same frequency)   Aerosol to MDI [] Albuterol Sulfate 0.083% unit dose by aerosol Albuterol Sulfate MDI 2 puffs by inhalation with spacer    [] Levalbuterol 1.25 mg unit dose by aerosol Levalbuterol MDI 2 puffs by inhalation with spacer    [] Levalbuterol 0.63 mg unit dose by aerosol Levalbuterol MDI 2 puffs by inhalation with spacer    [] Ipratropium Bromide 0.02% unit dose by aerosol Ipratropium Bromide MDI 2 puffs by inhalation with spacer    [] Duoneb (Ipratropium + Albuterol) unit dose by aerosol Ipratropium MDI + Albuterol MDI 2 puffs by inhalation w/spacer   MDI to Aerosol [] Albuterol Sulfate MDI Albuterol Sulfate 0.083% unit dose by aerosol    [] Levalbuterol MDI 2 puffs by inhalation Levalbuterol 1.25 mg unit dose by aerosol    [] Ipratropium Bromide MDI by inhalation Ipratropium Bromide 0.02% unit dose by aerosol    [] Combivent (Ipratropium + Albuterol) MDI by inhalation Duoneb (Ipratropium + Albuterol) unit dose by aerosol   Treatment Assessment [Frequency/Schedule]:  Change frequency to: ________Duoneb Q4 PRN __________________________________________per Protocol, P&T, MEC      Points 0 1 2 3 4   Pulmonary Status  Non-Smoker  [x]   Smoking history   < 20 pack years  []   Smoking history  ?  20 pack years  [] Pulmonary Disorder  (acute or chronic)  []   Severe or Chronic w/ Exacerbation  []     Surgical Status No [x]   Surgeries     General []   Surgery Lower []   Abdominal Thoracic or []   Upper Abdominal Thoracic with  PulmonaryDisorder  []     Chest X-ray Clear/Not  Ordered     [x]  Chronic Changes  Results Pending  []  Infiltrates, atelectasis, pleural effusion, or edema  []  Infiltrates in more than one lobe []  Infiltrate + Atelectasis, &/or pleural effusion  []    Respiratory Pattern Regular,  RR = 12-20 [x]  Increased,  RR = 21-25 []  VIZCARRA, irregular,  or RR = 26-30 []  Decreased FEV1  or RR = 31-35 []  Severe SOB, use  of accessory muscles, or RR ? 35  []    Mental Status Alert, oriented,  Cooperative []  Confused but Follows commands []  Lethargic or unable to follow commands [x]  Obtunded  []  Comatose  []    Breath Sounds Clear to  auscultation  [x]  Decreased unilaterally or  in bases only []  Decreased  bilaterally  []  Crackles or intermittent wheezes []  Wheezes []    Cough Strong, Spontan., & nonproductive [x]  Strong,  spontaneous, &  productive []  Weak,  Nonproductive []  Weak, productive or  with wheezes []  No spontaneous  cough or may require suctioning []    Level of Activity Ambulatory []  Ambulatory w/ Assist  []  Non-ambulatory [x]  Paraplegic []  Quadriplegic []    Total    Score:_____4__     Triage Score:_____5___      Tri       Triage:     1. (>20) Freq: Q3    2. (16-20) Freq: Q4   3. (11-15) Freq: QID & Albuterol Q2 PRN    4. (6-10) Freq: TID & Albuterol Q2 PRN    5. (0-5) Freq Q4prn

## 2018-09-09 NOTE — PROGRESS NOTES
Infectious Disease Progress Note    9/9/2018    Patient is a hospital followup regarding gram negative sepsis citrobacter and e coli MDRs   +NG  HEENT:Neck is supple  Heart: S1 S2  Lungs:bilaterally diminished  Abdomen: soft, ND  Extrem:+pulses, no pain indicated. Neuro exam: CN II-XII intact      Lab Results   Component Value Date    WBC 5.1 09/09/2018    HGB 9.0 (L) 09/09/2018    HCT 27.1 (L) 09/09/2018    MCV 79.7 (L) 09/09/2018     09/09/2018     Lab Results   Component Value Date     09/09/2018    K 4.2 09/09/2018    K 5.0 09/02/2018     09/09/2018    CO2 31 09/09/2018    BUN 19 09/09/2018    CREATININE 0.58 09/09/2018    GLUCOSE 134 09/09/2018    CALCIUM 9.0 09/09/2018        WBC trends are being monitored. Antibiotic doses are being adjusted per most recent renal labs.      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 coli sepsis (HCC)    Acute lower UTI    Stupor    Encephalopathy    Hypothermia    Aspiration pneumonia of left lower lobe due to vomit (HCC)    Coffee ground emesis    Type II or unspecified type diabetes mellitus without mention of complication, not stated as uncontrolled    Pseudophakia    Proliferative diabetic retinopathy ([address]    MALA (acute kidney injury) ([address]    UTI (urinary tract infection)    Gram negative sepsis (HCC)           ASSESSMENT and PLAN:    From an ID standpoint, will continue treatment for gram negative sepsis citrobacter and e coli MDRs  Continue Cefepime and supportive care       Sharla Stroud DO

## 2018-09-10 LAB
GLUCOSE BLD-MCNC: 132 MG/DL (ref 60–115)
GLUCOSE BLD-MCNC: 135 MG/DL (ref 60–115)
PERFORMED ON: ABNORMAL
PERFORMED ON: ABNORMAL

## 2018-09-10 PROCEDURE — 6370000000 HC RX 637 (ALT 250 FOR IP): Performed by: INTERNAL MEDICINE

## 2018-09-10 PROCEDURE — 97110 THERAPEUTIC EXERCISES: CPT

## 2018-09-10 PROCEDURE — 2580000003 HC RX 258: Performed by: NURSE PRACTITIONER

## 2018-09-10 PROCEDURE — 2700000000 HC OXYGEN THERAPY PER DAY

## 2018-09-10 PROCEDURE — 99232 SBSQ HOSP IP/OBS MODERATE 35: CPT | Performed by: INTERNAL MEDICINE

## 2018-09-10 PROCEDURE — 1210000000 HC MED SURG R&B

## 2018-09-10 PROCEDURE — 6370000000 HC RX 637 (ALT 250 FOR IP): Performed by: NURSE PRACTITIONER

## 2018-09-10 PROCEDURE — 6360000002 HC RX W HCPCS: Performed by: INTERNAL MEDICINE

## 2018-09-10 PROCEDURE — 2580000003 HC RX 258: Performed by: INTERNAL MEDICINE

## 2018-09-10 RX ADMIN — PANTOPRAZOLE SODIUM 40 MG: 40 GRANULE, DELAYED RELEASE ORAL at 05:30

## 2018-09-10 RX ADMIN — MINERAL SUPPLEMENT IRON 300 MG / 5 ML STRENGTH LIQUID 100 PER BOX UNFLAVORED 300 MG: at 10:40

## 2018-09-10 RX ADMIN — CEFEPIME 1 G: 1 INJECTION, POWDER, FOR SOLUTION INTRAMUSCULAR; INTRAVENOUS at 02:33

## 2018-09-10 RX ADMIN — Medication 500 MG: at 10:39

## 2018-09-10 RX ADMIN — ENOXAPARIN SODIUM 40 MG: 40 INJECTION SUBCUTANEOUS at 22:05

## 2018-09-10 RX ADMIN — MEMANTINE HYDROCHLORIDE 5 MG: 5 TABLET ORAL at 10:39

## 2018-09-10 RX ADMIN — DOCUSATE SODIUM 100 MG: 50 LIQUID ORAL at 22:05

## 2018-09-10 RX ADMIN — DOCUSATE SODIUM 100 MG: 50 LIQUID ORAL at 10:40

## 2018-09-10 RX ADMIN — FOLIC ACID 1 MG: 1 TABLET ORAL at 10:39

## 2018-09-10 RX ADMIN — ACETAMINOPHEN 650 MG: 325 TABLET ORAL at 22:05

## 2018-09-10 RX ADMIN — Medication 10 ML: at 22:06

## 2018-09-10 RX ADMIN — CEFEPIME 1 G: 1 INJECTION, POWDER, FOR SOLUTION INTRAMUSCULAR; INTRAVENOUS at 10:39

## 2018-09-10 ASSESSMENT — PAIN SCALES - GENERAL: PAINLEVEL_OUTOF10: 5

## 2018-09-10 NOTE — PROGRESS NOTES
Department of Internal Medicine  General Internal Medicine  Attending Progress Note      SUBJECTIVE:  She is somnolent.     OBJECTIVE      Medications    Current Facility-Administered Medications: [START ON 9/11/2018] ceFAZolin (ANCEF) 2 g in dextrose 5 % 100 mL IVPB, 2 g, Intravenous, On Call to OR  ipratropium-albuterol (DUONEB) nebulizer solution 1 ampule, 1 ampule, Inhalation, Q4H PRN  enoxaparin (LOVENOX) injection 40 mg, 40 mg, Subcutaneous, Daily  pantoprazole sodium (PROTONIX) packet 40 mg, 40 mg, Oral, QAM AC  ferrous sulfate 300 (60 Fe) MG/5ML syrup 300 mg, 300 mg, Oral, Daily with breakfast  senna (SENOKOT) 8.8 MG/5ML syrup 8.8 mg, 5 mL, Oral, BID PRN  docusate (COLACE) 50 MG/5ML liquid 100 mg, 100 mg, Oral, BID  guaiFENesin (ROBITUSSIN) 100 MG/5ML liquid 200 mg, 200 mg, Oral, Q4H PRN  insulin lispro (HUMALOG) injection vial 0-6 Units, 0-6 Units, Subcutaneous, 4 times per day  acetaminophen (TYLENOL) tablet 650 mg, 650 mg, Oral, Q4H PRN  calcium elemental (OSCAL) tablet 500 mg, 500 mg, Oral, Daily  folic acid (FOLVITE) tablet 1 mg, 1 mg, Oral, Daily  memantine (NAMENDA) tablet 5 mg, 5 mg, Oral, Daily  sodium chloride flush 0.9 % injection 10 mL, 10 mL, Intravenous, 2 times per day  sodium chloride flush 0.9 % injection 10 mL, 10 mL, Intravenous, PRN  magnesium hydroxide (MILK OF MAGNESIA) 400 MG/5ML suspension 30 mL, 30 mL, Oral, Daily PRN  ondansetron (ZOFRAN) injection 4 mg, 4 mg, Intravenous, Q6H PRN  glucose (GLUTOSE) 40 % oral gel 15 g, 15 g, Oral, PRN  dextrose 50 % solution 12.5 g, 12.5 g, Intravenous, PRN  glucagon (rDNA) injection 1 mg, 1 mg, Intramuscular, PRN  dextrose 5 % solution, 100 mL/hr, Intravenous, PRN  Physical    VITALS:  BP (!) 142/48   Pulse 75   Temp 97.8 °F (36.6 °C) (Oral)   Resp 18   Ht 5' 4\" (1.626 m)   Wt 203 lb 7.8 oz (92.3 kg)   SpO2 100%   BMI 34.93 kg/m²   Gen a-  & O x 3  HEENT - PERRLA, EOMI  CV - RRR no m/G/R, normal s1, s2  Lungs - CTA bilaterally no W/C/R  Abd - soft, NT, ND  Ext - no cyanosis or edema  Skin - no rashes noted      ASSESSMENT AND PLAN    Sepsis, secondary to UTI secondary to catheter and used a UTI.  - ID consulted     Aspiration versus healthcare associated pneumonia.     Respiratory failure, extubated and then down to nasal cannula.     Thrombocytopenia, probably secondary to sepsis, less likely HIT.  Heparin induced anti platelets serotonin assay is still pending.  No evidence of active bleed.  Improving.     Hypotension secondary to a sepsis and septic shock.  Improved over the last 48 hours.  Resolved.     Anemia, drop of hemoglobin from 11-7.  No evidence of active bleed or blood loss however I cannot exclude the possibility of our card to GI bleed.     Acute kidney failure secondary to above.  Resolved.     Nutrition, tube feed the tolerated per nursing staff.     Hypernatremia, hypokalemia, hypomagnesemia, intravenous infusion. Corrected     DVT prophylaxis, sequential compressive devices.  Start Lovenox given the resolution of the thrombocytopenia.     Dysphagia.   Speech and swallow therapist recommended nothing by mouth and the PEG tube.   Rosie Johnston MD, MSc

## 2018-09-10 NOTE — CARE COORDINATION
RN is aware pt will need a level of care to return to [facility]. Await KAYY to be completed to fax to [place] for the level of care. Electronically signed by VERENA Coronel on 9/10/18 at 10:25 AM    Level of care faxed to [facility]. Electronically signed by VERENA Coronel on 9/10/18 at 1:47 PM    Level of care refaxed to [employer] [facility]. LSW left a message for [facility]. Electronically signed by VERENA Coronel on 9/10/18 at 4:21 PM

## 2018-09-10 NOTE — PROGRESS NOTES
24  Diet NPO, After Midnight     MEDICATIONS during current hospitalization:    Continuous Infusions:   dextrose         Scheduled Meds:   [START ON 9/11/2018] ceFAZolin (ANCEF) IVPB  2 g Intravenous On Call to OR    enoxaparin  40 mg Subcutaneous Daily    pantoprazole sodium  40 mg Oral QAM AC    ferrous sulfate  300 mg Oral Daily with breakfast    cefepime  1 g Intravenous Q8H    docusate  100 mg Oral BID    insulin lispro  0-6 Units Subcutaneous 4 times per day    calcium elemental  500 mg Oral Daily    folic acid  1 mg Oral Daily    memantine  5 mg Oral Daily    sodium chloride flush  10 mL Intravenous 2 times per day       PRN Meds:ipratropium-albuterol, senna, guaiFENesin, acetaminophen, sodium chloride flush, magnesium hydroxide, ondansetron, glucose, dextrose, glucagon (rDNA), dextrose    Radiology  Ct Head Wo Contrast    Result Date: 9/1/2018  EXAM: CT SCAN OF THE BRAIN WITHOUT CONTRAST COMPARISON: 5/14/2018 REASONS FOR EXAMINATION:     DIABETES WITH ALTERED MENTAL STATUS TECHNIQUE:  CT brain is obtained without IV contrast agent. FINDINGS: An unenhanced CT scan of the brain demonstrates no evidence of a skull fracture. There is cerebral atrophy and age related findings in the brain. Mild patchy white matter hypoattenuation is likely a sequela of small vessel disease. There is no acute CVA. There is no acute intra-axial or extra-axial findings in the brain. There is no intracranial mass, hemorrhage, \"mass effect\" or midline shift. The remainder of the CT scan of the brain appears unremarkable. No significant change since the 5/14/2018 CT brain. 1. CEREBRAL ATROPHY AND AGE RELATED FINDINGS IN THE BRAIN. 2. NO ACUTE INTRA-AXIAL OR EXTRA-AXIAL FINDINGS IN THE BRAIN. 3. NO SIGNIFICANT CHANGE SINCE 5/14/2018 CT BRAIN.  All CT scans at this facility use dose modulation, iterative reconstruction, and/or weight based dosing when appropriate to reduce radiation dose to as low as reasonably internal jugular veins, subclavian veins, axillary veins, brachial veins, radial veins, ulnar veins, basilic veins and cephalic veins are widely patent and normal caliber throughout. Normal venous blood flow is documented with color Doppler images and pulsed Doppler waveform images. There are signs of subcutaneous edema, perhaps greatest distally bilaterally. Cephalic veins and basilic veins are normal caliber bilaterally and are widely patent bilaterally. CONCLUSION: UNREMARKABLE BILATERAL UPPER EXTREMITY VENOUS DUPLEX SONOGRAM        IMPRESSION AND SUGGESTION:  1. Acute hypoxemic respiratory failure secondary to aspiration pneumonia, , improved   2. UTI on broad-spectrum antibiotic treatment, culture positive for enterococcus group D   3. Encephalopathy. 4. Acute kidney injury , improved  5. Acute on chronic anemia, stable    Continue current treatment, aspiration precaution. Continue current med.   Wean off oxygen to keep saturation 92% or above,  pulmonary signed off: Please call if Needed    Electronically signed by Radhika Salazar MD, [facility] on 9/10/2018 at 4:17 PM

## 2018-09-10 NOTE — FLOWSHEET NOTE
Pt's dtr here and spoke briefly with Dr Carolyne Peters re: PEG tube placement. Pts dtr stating that she talked to another family member and that they might want to try Provigil for the pt. Dtr states that she has had it before and has shown some success.   Will pass message on to primary md.

## 2018-09-10 NOTE — CONSULTS
the placement of a percutaneous gastrostomy  tube for feeding purpose. The procedure and recommendations were discussed  with the brother who was present during the examination, they agreed.       Bhumika Barr MD    D: 09/10/2018 13:18:38       T: 09/10/2018 17:18:45     [email or web address]  Job#: [id number]     Doc#: [id number]    CC:  Tony Farrell MD

## 2018-09-11 ENCOUNTER — ANESTHESIA (OUTPATIENT)
Dept: OPERATING ROOM | Age: 73
DRG: 466 | End: 2018-09-11
Payer: MEDICAID

## 2018-09-11 ENCOUNTER — ANESTHESIA EVENT (OUTPATIENT)
Dept: OPERATING ROOM | Age: 73
DRG: 466 | End: 2018-09-11
Payer: MEDICAID

## 2018-09-11 VITALS — SYSTOLIC BLOOD PRESSURE: 117 MMHG | DIASTOLIC BLOOD PRESSURE: 52 MMHG | OXYGEN SATURATION: 100 %

## 2018-09-11 LAB
FERRITIN: 352.5 NG/ML (ref 13–150)
FOLATE: 17.1 NG/ML (ref 7.3–26.1)
GLUCOSE BLD-MCNC: 124 MG/DL (ref 60–115)
GLUCOSE BLD-MCNC: 128 MG/DL (ref 60–115)
GLUCOSE BLD-MCNC: 132 MG/DL (ref 60–115)
GLUCOSE BLD-MCNC: 136 MG/DL (ref 60–115)
GLUCOSE BLD-MCNC: 143 MG/DL (ref 60–115)
IRON SATURATION: 18 % (ref 11–46)
IRON: 31 UG/DL (ref 37–145)
PERFORMED ON: ABNORMAL
TOTAL IRON BINDING CAPACITY: 172 UG/DL (ref 178–450)
VITAMIN B-12: 1217 PG/ML (ref 232–1245)

## 2018-09-11 PROCEDURE — 2580000003 HC RX 258: Performed by: SURGERY

## 2018-09-11 PROCEDURE — 2500000003 HC RX 250 WO HCPCS: Performed by: SURGERY

## 2018-09-11 PROCEDURE — 82728 ASSAY OF FERRITIN: CPT

## 2018-09-11 PROCEDURE — 0DJ08ZZ INSPECTION OF UPPER INTESTINAL TRACT, VIA NATURAL OR ARTIFICIAL OPENING ENDOSCOPIC: ICD-10-PCS | Performed by: SPECIALIST

## 2018-09-11 PROCEDURE — 1210000000 HC MED SURG R&B

## 2018-09-11 PROCEDURE — 7100000001 HC PACU RECOVERY - ADDTL 15 MIN: Performed by: SURGERY

## 2018-09-11 PROCEDURE — 2500000003 HC RX 250 WO HCPCS: Performed by: NURSE ANESTHETIST, CERTIFIED REGISTERED

## 2018-09-11 PROCEDURE — 94640 AIRWAY INHALATION TREATMENT: CPT

## 2018-09-11 PROCEDURE — 82607 VITAMIN B-12: CPT

## 2018-09-11 PROCEDURE — 3700000001 HC ADD 15 MINUTES (ANESTHESIA): Performed by: SURGERY

## 2018-09-11 PROCEDURE — 3700000000 HC ANESTHESIA ATTENDED CARE: Performed by: SURGERY

## 2018-09-11 PROCEDURE — 2580000003 HC RX 258: Performed by: NURSE ANESTHETIST, CERTIFIED REGISTERED

## 2018-09-11 PROCEDURE — 0DH68UZ INSERTION OF FEEDING DEVICE INTO STOMACH, VIA NATURAL OR ARTIFICIAL OPENING ENDOSCOPIC: ICD-10-PCS | Performed by: SURGERY

## 2018-09-11 PROCEDURE — 2709999900 HC NON-CHARGEABLE SUPPLY: Performed by: SURGERY

## 2018-09-11 PROCEDURE — 7100000000 HC PACU RECOVERY - FIRST 15 MIN: Performed by: SURGERY

## 2018-09-11 PROCEDURE — 2580000003 HC RX 258: Performed by: NURSE PRACTITIONER

## 2018-09-11 PROCEDURE — 6360000002 HC RX W HCPCS: Performed by: NURSE ANESTHETIST, CERTIFIED REGISTERED

## 2018-09-11 PROCEDURE — 2580000003 HC RX 258

## 2018-09-11 PROCEDURE — 3609017100 HC EGD: Performed by: SURGERY

## 2018-09-11 PROCEDURE — 3609018000 HC EGD ESOPHAGOGASTRODUODENOSCOPY PEG TUBE INSERTION: Performed by: SURGERY

## 2018-09-11 PROCEDURE — 83540 ASSAY OF IRON: CPT

## 2018-09-11 PROCEDURE — 83550 IRON BINDING TEST: CPT

## 2018-09-11 PROCEDURE — 82746 ASSAY OF FOLIC ACID SERUM: CPT

## 2018-09-11 PROCEDURE — 6370000000 HC RX 637 (ALT 250 FOR IP): Performed by: INTERNAL MEDICINE

## 2018-09-11 PROCEDURE — 99231 SBSQ HOSP IP/OBS SF/LOW 25: CPT | Performed by: INTERNAL MEDICINE

## 2018-09-11 RX ORDER — LIDOCAINE HYDROCHLORIDE AND EPINEPHRINE 10; 10 MG/ML; UG/ML
INJECTION, SOLUTION INFILTRATION; PERINEURAL PRN
Status: DISCONTINUED | OUTPATIENT
Start: 2018-09-11 | End: 2018-09-11 | Stop reason: HOSPADM

## 2018-09-11 RX ORDER — HYDROCODONE BITARTRATE AND ACETAMINOPHEN 5; 325 MG/1; MG/1
2 TABLET ORAL PRN
Status: DISCONTINUED | OUTPATIENT
Start: 2018-09-11 | End: 2018-09-11 | Stop reason: HOSPADM

## 2018-09-11 RX ORDER — HYDROCODONE BITARTRATE AND ACETAMINOPHEN 5; 325 MG/1; MG/1
1 TABLET ORAL PRN
Status: DISCONTINUED | OUTPATIENT
Start: 2018-09-11 | End: 2018-09-11 | Stop reason: HOSPADM

## 2018-09-11 RX ORDER — PROPOFOL 10 MG/ML
INJECTION, EMULSION INTRAVENOUS PRN
Status: DISCONTINUED | OUTPATIENT
Start: 2018-09-11 | End: 2018-09-11 | Stop reason: SDUPTHER

## 2018-09-11 RX ORDER — SODIUM CHLORIDE, SODIUM LACTATE, POTASSIUM CHLORIDE, CALCIUM CHLORIDE 600; 310; 30; 20 MG/100ML; MG/100ML; MG/100ML; MG/100ML
INJECTION, SOLUTION INTRAVENOUS CONTINUOUS
Status: DISCONTINUED | OUTPATIENT
Start: 2018-09-11 | End: 2018-09-11

## 2018-09-11 RX ORDER — FENTANYL CITRATE 50 UG/ML
50 INJECTION, SOLUTION INTRAMUSCULAR; INTRAVENOUS EVERY 10 MIN PRN
Status: DISCONTINUED | OUTPATIENT
Start: 2018-09-11 | End: 2018-09-11 | Stop reason: HOSPADM

## 2018-09-11 RX ORDER — DIPHENHYDRAMINE HYDROCHLORIDE 50 MG/ML
12.5 INJECTION INTRAMUSCULAR; INTRAVENOUS
Status: DISCONTINUED | OUTPATIENT
Start: 2018-09-11 | End: 2018-09-11 | Stop reason: HOSPADM

## 2018-09-11 RX ORDER — SODIUM CHLORIDE, SODIUM LACTATE, POTASSIUM CHLORIDE, CALCIUM CHLORIDE 600; 310; 30; 20 MG/100ML; MG/100ML; MG/100ML; MG/100ML
INJECTION, SOLUTION INTRAVENOUS
Status: COMPLETED
Start: 2018-09-11 | End: 2018-09-11

## 2018-09-11 RX ORDER — LIDOCAINE HYDROCHLORIDE 20 MG/ML
INJECTION, SOLUTION INFILTRATION; PERINEURAL PRN
Status: DISCONTINUED | OUTPATIENT
Start: 2018-09-11 | End: 2018-09-11 | Stop reason: SDUPTHER

## 2018-09-11 RX ORDER — METOCLOPRAMIDE HYDROCHLORIDE 5 MG/ML
10 INJECTION INTRAMUSCULAR; INTRAVENOUS
Status: DISCONTINUED | OUTPATIENT
Start: 2018-09-11 | End: 2018-09-11 | Stop reason: HOSPADM

## 2018-09-11 RX ORDER — CEFAZOLIN SODIUM 1 G/50ML
INJECTION, SOLUTION INTRAVENOUS PRN
Status: DISCONTINUED | OUTPATIENT
Start: 2018-09-11 | End: 2018-09-11 | Stop reason: SDUPTHER

## 2018-09-11 RX ORDER — ONDANSETRON 2 MG/ML
4 INJECTION INTRAMUSCULAR; INTRAVENOUS
Status: DISCONTINUED | OUTPATIENT
Start: 2018-09-11 | End: 2018-09-11 | Stop reason: HOSPADM

## 2018-09-11 RX ORDER — SODIUM CHLORIDE, SODIUM LACTATE, POTASSIUM CHLORIDE, CALCIUM CHLORIDE 600; 310; 30; 20 MG/100ML; MG/100ML; MG/100ML; MG/100ML
INJECTION, SOLUTION INTRAVENOUS CONTINUOUS PRN
Status: DISCONTINUED | OUTPATIENT
Start: 2018-09-11 | End: 2018-09-11 | Stop reason: SDUPTHER

## 2018-09-11 RX ORDER — MEPERIDINE HYDROCHLORIDE 25 MG/ML
12.5 INJECTION INTRAMUSCULAR; INTRAVENOUS; SUBCUTANEOUS EVERY 5 MIN PRN
Status: DISCONTINUED | OUTPATIENT
Start: 2018-09-11 | End: 2018-09-11 | Stop reason: HOSPADM

## 2018-09-11 RX ORDER — MAGNESIUM HYDROXIDE 1200 MG/15ML
LIQUID ORAL PRN
Status: DISCONTINUED | OUTPATIENT
Start: 2018-09-11 | End: 2018-09-11 | Stop reason: HOSPADM

## 2018-09-11 RX ADMIN — IPRATROPIUM BROMIDE AND ALBUTEROL SULFATE 1 AMPULE: .5; 3 SOLUTION RESPIRATORY (INHALATION) at 23:51

## 2018-09-11 RX ADMIN — Medication 10 ML: at 20:33

## 2018-09-11 RX ADMIN — LIDOCAINE HYDROCHLORIDE 50 MG: 20 INJECTION, SOLUTION INFILTRATION; PERINEURAL at 13:22

## 2018-09-11 RX ADMIN — SODIUM CHLORIDE, POTASSIUM CHLORIDE, SODIUM LACTATE AND CALCIUM CHLORIDE: 600; 310; 30; 20 INJECTION, SOLUTION INTRAVENOUS at 12:46

## 2018-09-11 RX ADMIN — PHENYLEPHRINE HYDROCHLORIDE 100 MCG: 10 INJECTION INTRAVENOUS at 13:35

## 2018-09-11 RX ADMIN — PROPOFOL 50 MG: 10 INJECTION, EMULSION INTRAVENOUS at 13:22

## 2018-09-11 RX ADMIN — CEFAZOLIN SODIUM 1 G: 1 INJECTION, SOLUTION INTRAVENOUS at 13:12

## 2018-09-11 RX ADMIN — SODIUM CHLORIDE, SODIUM LACTATE, POTASSIUM CHLORIDE, CALCIUM CHLORIDE: 600; 310; 30; 20 INJECTION, SOLUTION INTRAVENOUS at 12:46

## 2018-09-11 RX ADMIN — SODIUM CHLORIDE, POTASSIUM CHLORIDE, SODIUM LACTATE AND CALCIUM CHLORIDE: 600; 310; 30; 20 INJECTION, SOLUTION INTRAVENOUS at 12:48

## 2018-09-11 ASSESSMENT — PULMONARY FUNCTION TESTS
PIF_VALUE: 18
PIF_VALUE: 1
PIF_VALUE: 18
PIF_VALUE: 18
PIF_VALUE: 1
PIF_VALUE: 5
PIF_VALUE: 1
PIF_VALUE: 18
PIF_VALUE: 1
PIF_VALUE: 18
PIF_VALUE: 1
PIF_VALUE: 18
PIF_VALUE: 0
PIF_VALUE: 18
PIF_VALUE: 18
PIF_VALUE: 1
PIF_VALUE: 1
PIF_VALUE: 18
PIF_VALUE: 2
PIF_VALUE: 18
PIF_VALUE: 1

## 2018-09-11 NOTE — BRIEF OP NOTE
Brief Postoperative Note  ______________________________________________________________    Patient: Teresa Roth  YOB: 1945  MRN: [id number]  Date of Procedure: 9/11/2018    Pre-Op Diagnosis: DYSPHAGIA    Post-Op Diagnosis: Same       Procedure(s):  EGD ESOPHAGOGASTRODUODENOSCOPY PEG TUBE INSERTION  EGD ESOPHAGOGASTRODUODENOSCOPY ROOM 179-1    Anesthesia: Monitor Anesthesia Care    Surgeon(s):  MD Venkata Portillo MD    Staff:  [employer]: Geeta Hunter     Estimated Blood Loss: * No values recorded between 9/11/2018  1:12 PM and 9/11/2018  1:36 PM * None    Complications: None    Specimens:   * No specimens in log *    Implants:  * No implants in log *      Drains:   Urethral Catheter Non-latex 16 fr (Active)   $ Urethral catheter insertion $ Not inserted for procedure 9/8/2018 11:30 PM   Catheter Indications Need for fluid management in critically ill patients in a critical care setting not able to be managed by other means such as BSC with hat, bedpan, urinal, condom catheter, or short term intermittent urethral catherization; Other (specify) 9/11/2018 10:52 AM   Securement Device Date Changed 09/04/18 9/9/2018 10:00 PM   Site Assessment No urethral drainage 9/11/2018 10:52 AM   Urine Color Yellow 9/10/2018 10:00 PM   Urine Appearance Sediment 9/10/2018 10:00 PM   Urine Odor Malodorous 9/8/2018  8:00 AM   Output (mL) 300 mL 9/10/2018 11:53 PM       [REMOVED] NG/OG Tube Orogastric 16 fr Right mouth (Removed)       [REMOVED] NG/OG Tube Nasogastric 16 fr Right nostril (Removed)   Surrounding Skin Dry; Intact; Non reddened 9/11/2018 10:52 AM   Dressing Status Clean;Dry; Intact 9/11/2018 10:52 AM   Status Other (Comment) 9/9/2018 10:00 PM   Placement Verified by Air Bolus 9/11/2018 10:52 AM   NG/OG Measurement (cm) 60 cm 9/11/2018 10:52 AM   Drainage Appearance Tan; Thin 9/11/2018  6:08 AM   Tube Feeding Diabetic 9/11/2018  6:08 AM   Tube Feeding Status Stopped 9/11/2018  6:08 AM Rate/Schedule 55 mL/hr 9/10/2018  5:48 AM   Tube Feeding Intake (mL) 1226 ml 9/11/2018  6:08 AM   Free Water Flush (mL) 0 mL 9/11/2018 10:52 AM   Free Water Rate 50 9/7/2018  6:29 AM   Residual Volume (ml) 0 ml 9/11/2018  6:08 AM       [REMOVED] Urethral Catheter 16 fr (Removed)   Catheter Indications Urology/Urologist seeing this patient or inserted indwelling catheter;Acute urinary retention/obstruction 9/4/2018  8:02 AM   Securement Device Date Changed 09/01/18 9/3/2018  7:30 PM   Site Assessment No urethral drainage 9/4/2018  8:02 AM   Urine Color Kristi 9/4/2018  8:02 AM   Urine Appearance Cloudy 9/4/2018  8:02 AM   Urine Odor Malodorous 9/4/2018  8:02 AM   Output (mL) 1450 mL 9/4/2018  5:00 AM   Provider Notified to Remove No 9/4/2018  8:02 AM       Findings: neelima Ayala MD  Date: 9/11/2018  Time: 1:37 PM

## 2018-09-11 NOTE — PROGRESS NOTES
SEDRATE 39 (H) 05/11/2018     No results found for: CRP  Lab Results   Component Value Date    LABA1C 5.4 01/16/2015     No results found for: EAG    MICROBIOLOGY:   No cultures of foot      IMAGING:   None      Patient's case will be discussed with staff, who will provide final recommendations. Thank you for the consult.     Eduardo Guillermo, PGY3  Please first page [facility] On Call, [phone]  September 11, 2018  10:35 AM

## 2018-09-11 NOTE — PROGRESS NOTES
OR holding nurses made aware that patient is restrained and patient is to go down by bed.  Electronically signed by Magdalena Ovalle RN on 9/11/2018 at 12:14 PM

## 2018-09-11 NOTE — PROGRESS NOTES
Gram-negative sepsis Citrobacter and E. coli    Unable to perform ROS: Dementia   Physical Exam:    BP (!) 143/69   Pulse 73   Temp 97.5 °F (36.4 °C)   Resp 22   Ht 5' 4\" (1.626 m)   Wt 184 lb 1.4 oz (83.5 kg)   SpO2 99%   BMI 31.60 kg/m²   Cardiovascular: Normal heart sounds and intact distal pulses. No murmur heard. Pulmonary/Chest: She has no wheezes. She has no rales. She exhibits no tenderness. Abdominal: Soft. She exhibits no distension and no mass. There is no hepatosplenomegaly, splenomegaly or hepatomegaly. There is no tenderness. There is no rebound.            PLAN:    Gram-negative sepsis Citrobacter and E.  Coli    Completed 10 days of  cefepime

## 2018-09-11 NOTE — PROGRESS NOTES
Neurology Follow up    SUBJECTIVE:   Patient is somewhat somnolent. no commands  Followed simple commands off and on. Moving her extremities minimally. Movements minimal in the lower extremities. Vital Sign Statistics (last 24 hours)     [address] Name Min Max   Temp 97.2 °F (36.2 °C) 98.2 °F (36.8 °C)   Pulse 70 100   Resp 16 18   BP: Systolic 62 901   BP: Diastolic 42 66   SpO2 94 % 100 %   Intake/Output      09/07/18 0701 - 09/08/18 0700    [id number] Total   Intake (ml) -- -- -- --   Output (ml) -- 900 -- 900   Net (ml) -- -900 -- -900            Mental Status Exam:             Level of Alertness:   awake            Orientation:   person, place, time                      Attention/Concentration:  normal            Language:      Cranial Nerves              Cranial nerve III           Pupils:  equal, round, reactive to light      Cranial nerves III, IV, VI           Extraocular Movements: intact        Cranial nerve VIII           Hearing:  intact      Cranial nerve IX           Palate:  intact      Cranial nerve XI         Shoulder shrug:  intact      Cranial nerve XII          Tongue movement:  normal    Motor:    Drift:  absent  Motor exam is 3/5 throughout arms and legs however still has wrist restraints, likely when more awake she is stronger than on my exam  Tone:  normal  Abnormal Movements:  absent            Sensory: grimaces to noxious stim in all fours          Coordination:  Cannot be tested d/t difficulty following commands. Reflexes:             Deep Tendon Reflexes:             Reflexes are 2 +             Plantar response:                Right:  downgoing               Left:  downgoing    Vascular:  Cardiac Exam:  normal         Ct Head Wo Contrast    Result Date: 9/1/2018  EXAM: CT SCAN OF THE BRAIN WITHOUT CONTRAST COMPARISON: 5/14/2018 REASONS FOR EXAMINATION:     DIABETES WITH ALTERED MENTAL STATUS TECHNIQUE:  CT brain is obtained without IV contrast agent. obtained. Comparison: Chest x-ray from August 30, 2018 Findings: Atherosclerotic calcification of the thoracic aorta. Surgical clips project over the cardiac silhouette. The cardiomediastinal silhouette is within normal limits. No pneumothorax, pleural effusion, or focal consolidation. Osseous structures of the thorax  are intact. IMPRESSION: No acute intrathoracic process. Xr Chest Portable    Result Date: 9/1/2018  EXAMINATION: PORTABLE CHEST X-RAY FROM 9/1/2018 AT 10:45 HOURS CLINICAL HISTORY: CHECK RIGHT CENTRAL VENOUS LINE PLACEMENT COMPARISONS: 8/31/2018 FINDINGS: There is borderline cardiomegaly. There is atherosclerotic tortuous aorta. The distal tip of the right central venous catheter is extending into the right side of the neck and presumably within the right internal jugular vein and the right CVC should be repositioned. There is no pneumothorax. There is subsegmental atelectasis or developing small streaky infiltrate in the left lung base. Rounded lucency superimposed on the left cardiac silhouette may very well represent a hiatal hernia. There is degenerative bone spurring throughout the spine. 1. DISTAL TIP OF RIGHT CENTRAL VENOUS LINE IS IN RIGHT SIDE OF THE NECK AND RIGHT CVC SHOULD BE REPOSITIONED. 2. NO EVIDENCE OF A PNEUMOTHORAX POST RIGHT CVC LINE PLACEMENT. 3. SUSPECT SUBSEGMENTAL ATELECTASIS OR PERHAPS DEVELOPING SMALL INFILTRATE IN LEFT LUNG BASE. 4. BORDERLINE CARDIOMEGALY AND ATHEROSCLEROTIC TORTUOUS AORTA. Recent Labs      09/07/18   0542  09/09/18   0600   WBC  7.4  5.1   HGB  8.9*  9.0*   PLT  89*  163     No results for input(s): BILITOT, ALKPHOS, AST, ALT in the last 72 hours. Lab Results   Component Value Date    PROTIME 11.1 08/31/2018    INR 1.1 08/31/2018     Lab Results   Component Value Date    VALPROATE 9.7 08/17/2016       ASSESSMENT AND PLAN    Encephalopathy,still intermittent issues   metabolic/infective from urosepsis, had alfaro.  Pneumonia with resolved

## 2018-09-11 NOTE — PROGRESS NOTES
Patient's daughter/ LULU Islas spoke with both Dr. Jenny Kendrick and Dr. Kirstie Ragland and has agreed to go through with PEG placement. Consent signed and placed in chart.  Electronically signed by Magdalena Ovalle RN on 9/11/2018 at 10:02 AM

## 2018-09-11 NOTE — PROGRESS NOTES
Hospitalist  Follow-up      Date of Service: 9/11/2018    Subjective:             Awake looking upward confused but arousbale to voice. When I asked he if she has chest or abd pain, she replied: \"no pain\"  Past Medical History:   Diagnosis Date    Alzheimer disease     Diabetes mellitus ([address]     GERD (gastroesophageal reflux disease)     Hypertension     Osteoarthritis     Retinopathy      family history is not on file. reports that she has never smoked. She has never used smokeless tobacco. She reports that she does not drink alcohol or use drugs. Case DW RN       Objective:  Exam:  BP 99/84   Pulse 65   Temp 97.7 °F (36.5 °C)   Resp 18   Ht 5' 4\" (1.626 m)   Wt 184 lb 1.4 oz (83.5 kg)   SpO2 100%   BMI 31.60 kg/m²     Physical Exam:  Gen a-  awake confused. No resp distress  HEENT - PERRLA, EOMI  CV - RRR no m/G/R, normal s1, s2  Lungs - CTA bilaterally anteriorly  Abd - soft, NT, ND. CVC noted in rt groin no signs of bleeding or infection. Sánchez in place  Ext - no cyanosis or edema.  Rt heel ulcer  Skin - no rashes noted    Medications:  Current Facility-Administered Medications   Medication Dose Route Frequency Provider Last Rate Last Dose    ceFAZolin (ANCEF) 2 g in dextrose 5 % 100 mL IVPB  2 g Intravenous On Call to 83 Margi Mccarthy MD        ipratropium-albuterol (DUONEB) nebulizer solution 1 ampule  1 ampule Inhalation Q4H PRN Marta Lockett MD        enoxaparin (LOVENOX) injection 40 mg  40 mg Subcutaneous Daily Samy Hernandez MD   40 mg at 09/10/18 2205    pantoprazole sodium (PROTONIX) packet 40 mg  40 mg Oral QAM AC Nicole Stokes MD   Stopped at 09/11/18 0748    ferrous sulfate 300 (60 Fe) MG/5ML syrup 300 mg  300 mg Oral Daily with breakfast Moiz Sanchez MD   Stopped at 09/11/18 0748    senna (SENOKOT) 8.8 MG/5ML syrup 8.8 mg  5 mL Oral BID PRN Samy Hernandez MD        docusate (COLACE) 50 MG/5ML liquid 100 mg  100 mg Oral BID Marta Lockett MD   Stopped at Date    WBC 5.1 09/09/2018    RBC 3.39 09/09/2018    HGB 9.0 09/09/2018    HCT 27.1 09/09/2018    MCV 79.7 09/09/2018    RDW 22.2 09/09/2018     09/09/2018     Lab Results   Component Value Date    INR 1.1 08/31/2018    PROTIME 11.1 08/31/2018     Organism   Date Value Ref Range Status   08/31/2018 Morganella morganii ssp morganii (A)  Final   08/31/2018 Enterococcus faecalis VRE (A)  Final       RADIOLOGY:  Ct Head Wo Contrast    Result Date: 9/1/2018  EXAM: CT SCAN OF THE BRAIN WITHOUT CONTRAST COMPARISON: 5/14/2018 REASONS FOR EXAMINATION:     DIABETES WITH ALTERED MENTAL STATUS TECHNIQUE:  CT brain is obtained without IV contrast agent. FINDINGS: An unenhanced CT scan of the brain demonstrates no evidence of a skull fracture. There is cerebral atrophy and age related findings in the brain. Mild patchy white matter hypoattenuation is likely a sequela of small vessel disease. There is no acute CVA. There is no acute intra-axial or extra-axial findings in the brain. There is no intracranial mass, hemorrhage, \"mass effect\" or midline shift. The remainder of the CT scan of the brain appears unremarkable. No significant change since the 5/14/2018 CT brain. 1. CEREBRAL ATROPHY AND AGE RELATED FINDINGS IN THE BRAIN. 2. NO ACUTE INTRA-AXIAL OR EXTRA-AXIAL FINDINGS IN THE BRAIN. 3. NO SIGNIFICANT CHANGE SINCE 5/14/2018 CT BRAIN. All CT scans at this facility use dose modulation, iterative reconstruction, and/or weight based dosing when appropriate to reduce radiation dose to as low as reasonably achievable. Xr Chest Portable    Result Date: 9/1/2018  Portable chest radiograph History: Intubation. Technique: AP portable view of the chest obtained. Comparison: Chest radiograph from September 1, 2018, 1045 hours Findings: Interval placement of an endotracheal tube, its tip terminating at the level of the clavicles.  Interval placement of an enteric tube, which abruptly curves just proximal to the diaphragm likely secondary to the patient's hiatal hernia. Atherosclerotic ossification of the thoracic aorta. Continued mediastinal silhouette is within normal limits. Patchy bilateral opacities within each lung base. A pneumothorax or pleural effusion. Interval placement of endotracheal tube, terminating at the level of the clavicles. Enteric tube is present and abruptly turns proximal diaphragm likely due to the patient's hiatal hernia. Subsegmental atelectasis versus small infiltrate in both lung bases. Xr Chest Portable    Result Date: 9/1/2018  Portable chest radiograph History: Shortness of breath Technique: AP portable view of the chest obtained. Comparison: Chest x-ray from August 30, 2018 Findings: Atherosclerotic calcification of the thoracic aorta. Surgical clips project over the cardiac silhouette. The cardiomediastinal silhouette is within normal limits. No pneumothorax, pleural effusion, or focal consolidation. Osseous structures of the thorax  are intact. IMPRESSION: No acute intrathoracic process. Xr Chest Portable    Result Date: 9/1/2018  EXAMINATION: PORTABLE CHEST X-RAY FROM 9/1/2018 AT 10:45 HOURS CLINICAL HISTORY: CHECK RIGHT CENTRAL VENOUS LINE PLACEMENT COMPARISONS: 8/31/2018 FINDINGS: There is borderline cardiomegaly. There is atherosclerotic tortuous aorta. The distal tip of the right central venous catheter is extending into the right side of the neck and presumably within the right internal jugular vein and the right CVC should be repositioned. There is no pneumothorax. There is subsegmental atelectasis or developing small streaky infiltrate in the left lung base. Rounded lucency superimposed on the left cardiac silhouette may very well represent a hiatal hernia. There is degenerative bone spurring throughout the spine. 1. DISTAL TIP OF RIGHT CENTRAL VENOUS LINE IS IN RIGHT SIDE OF THE NECK AND RIGHT CVC SHOULD BE REPOSITIONED.  2. NO EVIDENCE OF A PNEUMOTHORAX POST RIGHT CVC LINE PLACEMENT. 3. SUSPECT SUBSEGMENTAL ATELECTASIS OR PERHAPS DEVELOPING SMALL INFILTRATE IN LEFT LUNG BASE. 4. BORDERLINE CARDIOMEGALY AND ATHEROSCLEROTIC TORTUOUS AORTA. Asesment and Plan :  . GN Sepsis. Resolved. cirtobactor and Ecoli. Completed 10 days of atb therapy  . Met encephalopathy. Underlying vascular dementia  . Dysphagia failed swallow test for PEG tube today  . Rt heel decubitus ulcer. Podiatry is following  . Anemia. No active bleed. hgb stable  . MALA. Resolved  . Mod-severe malnutirtion  . Hypernatremia resolved  . Recommendations:   Today for peg tube  May dc CVC in rt groin today or tomorrow  Dc planning to snf  dvt phx      Law George MD    Electronically signed by Law George MD on 9/11/18 at 11:37 AM

## 2018-09-11 NOTE — CARE COORDINATION
LSW spoke with Cindy Zhang earlier today. Discharge plan remains for the pt to return to [facility]. Level of care received from [facility]. LSW faxed the level of care to Manjula Reeves, [address] [place] [address].  Electronically signed by VERENA Giles on 9/11/18 at 11:26 AM

## 2018-09-11 NOTE — PROGRESS NOTES
Speech Language Pathology    NAME:  Liss Nova  ROOM: [id number]  :  1945  DATE: 2018      Speech Therapy attempted to see Liss Nova on this date for a/an:    [] Bedside Swallow Evaluation   [] Speech/Language Evaluation   [] Modified Barium Swallow   [x] Treatment    Pt was unable to be seen due to patient:   [] Currently off unit   [] Refused   [] Too lethargic to participate    [x] NPO for PEG placement this date   [] On Bipap   [] Nursing Deferred:   [] Other:        Electronically signed by PEG Mays on 18 at 8:34 AM

## 2018-09-12 LAB
GLUCOSE BLD-MCNC: 110 MG/DL (ref 60–115)
GLUCOSE BLD-MCNC: 144 MG/DL (ref 60–115)
GLUCOSE BLD-MCNC: 147 MG/DL (ref 60–115)
GLUCOSE BLD-MCNC: 184 MG/DL (ref 60–115)
PERFORMED ON: ABNORMAL
PERFORMED ON: NORMAL

## 2018-09-12 PROCEDURE — 1210000000 HC MED SURG R&B

## 2018-09-12 PROCEDURE — 6370000000 HC RX 637 (ALT 250 FOR IP): Performed by: INTERNAL MEDICINE

## 2018-09-12 PROCEDURE — 6370000000 HC RX 637 (ALT 250 FOR IP): Performed by: NURSE PRACTITIONER

## 2018-09-12 PROCEDURE — 2700000000 HC OXYGEN THERAPY PER DAY

## 2018-09-12 PROCEDURE — 87040 BLOOD CULTURE FOR BACTERIA: CPT

## 2018-09-12 PROCEDURE — 2580000003 HC RX 258: Performed by: NURSE PRACTITIONER

## 2018-09-12 PROCEDURE — 99231 SBSQ HOSP IP/OBS SF/LOW 25: CPT | Performed by: INTERNAL MEDICINE

## 2018-09-12 PROCEDURE — 6370000000 HC RX 637 (ALT 250 FOR IP): Performed by: PSYCHIATRY & NEUROLOGY

## 2018-09-12 PROCEDURE — 6360000002 HC RX W HCPCS: Performed by: INTERNAL MEDICINE

## 2018-09-12 RX ORDER — MODAFINIL 100 MG/1
100 TABLET ORAL 2 TIMES DAILY
Status: DISCONTINUED | OUTPATIENT
Start: 2018-09-12 | End: 2018-09-18 | Stop reason: HOSPADM

## 2018-09-12 RX ORDER — CIPROFLOXACIN 2 MG/ML
400 INJECTION, SOLUTION INTRAVENOUS EVERY 12 HOURS
Status: DISCONTINUED | OUTPATIENT
Start: 2018-09-12 | End: 2018-09-16

## 2018-09-12 RX ORDER — ACETAMINOPHEN 650 MG/1
650 SUPPOSITORY RECTAL EVERY 6 HOURS PRN
Status: DISCONTINUED | OUTPATIENT
Start: 2018-09-12 | End: 2018-09-18 | Stop reason: HOSPADM

## 2018-09-12 RX ADMIN — Medication 10 ML: at 22:51

## 2018-09-12 RX ADMIN — INSULIN LISPRO 1 UNITS: 100 INJECTION, SOLUTION INTRAVENOUS; SUBCUTANEOUS at 12:44

## 2018-09-12 RX ADMIN — CIPROFLOXACIN 400 MG: 2 INJECTION, SOLUTION INTRAVENOUS at 22:51

## 2018-09-12 RX ADMIN — DOCUSATE SODIUM 100 MG: 50 LIQUID ORAL at 10:00

## 2018-09-12 RX ADMIN — Medication 10 ML: at 10:01

## 2018-09-12 RX ADMIN — MINERAL SUPPLEMENT IRON 300 MG / 5 ML STRENGTH LIQUID 100 PER BOX UNFLAVORED 300 MG: at 10:00

## 2018-09-12 RX ADMIN — Medication 500 MG: at 10:01

## 2018-09-12 RX ADMIN — ACETAMINOPHEN 650 MG: 650 SUPPOSITORY RECTAL at 20:09

## 2018-09-12 RX ADMIN — PANTOPRAZOLE SODIUM 40 MG: 40 GRANULE, DELAYED RELEASE ORAL at 05:32

## 2018-09-12 RX ADMIN — MODAFINIL 100 MG: 100 TABLET ORAL at 15:48

## 2018-09-12 RX ADMIN — FOLIC ACID 1 MG: 1 TABLET ORAL at 10:01

## 2018-09-12 RX ADMIN — ENOXAPARIN SODIUM 40 MG: 40 INJECTION SUBCUTANEOUS at 20:10

## 2018-09-12 RX ADMIN — MEMANTINE HYDROCHLORIDE 5 MG: 5 TABLET ORAL at 10:01

## 2018-09-12 NOTE — PROGRESS NOTES
Gram-negative sepsis Citrobacter and E. coli    Unable to perform ROS: Dementia   Physical Exam:    BP (!) 79/30   Pulse (!) 46   Temp 98.9 °F (37.2 °C)   Resp 16   Ht 5' 4\" (1.626 m)   Wt 190 lb 14.7 oz (86.6 kg)   SpO2 100%   BMI 32.77 kg/m²   Cardiovascular: Normal heart sounds and intact distal pulses. No murmur heard. Pulmonary/Chest: She has no wheezes. She has no rales. She exhibits no tenderness. Abdominal: Soft. She exhibits no distension and no mass. There is no hepatosplenomegaly, splenomegaly or hepatomegaly. There is no tenderness. There is no rebound.            PLAN:    Gram-negative sepsis Citrobacter and E.  Coli    Completed 10 days of  Cefepime      Sign off infectious disease

## 2018-09-12 NOTE — FLOWSHEET NOTE
Assisted reposition, rt groin cvc removed without difficulty. New 22 left shoulder. Pt has occ moist nonproductiove cough. Oral suction used to removed large amount of dark brown/grey phlegm.  Electronically signed by Meg Palencia RN on 9/12/2018 at 2:27 PM

## 2018-09-12 NOTE — PROGRESS NOTES
Hospitalist  Follow-up      Date of Service: 9/12/2018    Subjective:             Patient is seen. No change. Had peg tube placed yesterday. soft restraints are removed this am  Past Medical History:   Diagnosis Date    Alzheimer disease     Diabetes mellitus ([address]     GERD (gastroesophageal reflux disease)     Hypertension     Osteoarthritis     Retinopathy      family history is not on file. reports that she has never smoked. She has never used smokeless tobacco. She reports that she does not drink alcohol or use drugs. Case DW RN       Objective:  Exam:  BP (!) 103/30   Pulse 111   Temp 100 °F (37.8 °C)   Resp 16   Ht 5' 4\" (1.626 m)   Wt 190 lb 14.7 oz (86.6 kg)   SpO2 97%   BMI 32.77 kg/m²     Physical Exam:    Gen a-  awake confused. No resp distress  HEENT - PERRLA, EOMI  CV - RRR no m/G/R, normal s1, s2  Lungs - CTA bilaterally anteriorly  Abd - soft, NT, ND. CVC noted in rt groin no signs of bleeding or infection. Sánchez in place  Ext - no cyanosis or edema.  Rt heel ulcer  Skin - no rashes noted    Medications:  Current Facility-Administered Medications   Medication Dose Route Frequency Provider Last Rate Last Dose    modafinil (PROVIGIL) tablet 100 mg  100 mg Oral BID Demarco Petty MD        ipratropium-albuterol (DUONEB) nebulizer solution 1 ampule  1 ampule Inhalation Q4H PRN Jovi Davis MD   1 ampule at 09/11/18 2351    enoxaparin (LOVENOX) injection 40 mg  40 mg Subcutaneous Daily Samy Hernandez MD   40 mg at 09/10/18 2205    pantoprazole sodium (PROTONIX) packet 40 mg  40 mg Oral QAM GUILLAUME Toney MD   40 mg at 09/12/18 0532    ferrous sulfate 300 (60 Fe) MG/5ML syrup 300 mg  300 mg Oral Daily with breakfast Phil Roldan MD   300 mg at 09/12/18 1000    senna (SENOKOT) 8.8 MG/5ML syrup 8.8 mg  5 mL Oral BID PRN Samy Hernandez MD        docusate (COLACE) 50 MG/5ML liquid 100 mg  100 mg Oral BID Jovi Davis MD   100 mg at 09/12/18 1000    guaiFENesin 3.39 09/09/2018    HGB 9.0 09/09/2018    HCT 27.1 09/09/2018    MCV 79.7 09/09/2018    RDW 22.2 09/09/2018     09/09/2018     Lab Results   Component Value Date    INR 1.1 08/31/2018    PROTIME 11.1 08/31/2018     Organism   Date Value Ref Range Status   08/31/2018 Morganella morganii ssp morganii (A)  Final   08/31/2018 Enterococcus faecalis VRE (A)  Final       RADIOLOGY:  Ct Head Wo Contrast    Result Date: 9/1/2018  EXAM: CT SCAN OF THE BRAIN WITHOUT CONTRAST COMPARISON: 5/14/2018 REASONS FOR EXAMINATION:     DIABETES WITH ALTERED MENTAL STATUS TECHNIQUE:  CT brain is obtained without IV contrast agent. FINDINGS: An unenhanced CT scan of the brain demonstrates no evidence of a skull fracture. There is cerebral atrophy and age related findings in the brain. Mild patchy white matter hypoattenuation is likely a sequela of small vessel disease. There is no acute CVA. There is no acute intra-axial or extra-axial findings in the brain. There is no intracranial mass, hemorrhage, \"mass effect\" or midline shift. The remainder of the CT scan of the brain appears unremarkable. No significant change since the 5/14/2018 CT brain. 1. CEREBRAL ATROPHY AND AGE RELATED FINDINGS IN THE BRAIN. 2. NO ACUTE INTRA-AXIAL OR EXTRA-AXIAL FINDINGS IN THE BRAIN. 3. NO SIGNIFICANT CHANGE SINCE 5/14/2018 CT BRAIN. All CT scans at this facility use dose modulation, iterative reconstruction, and/or weight based dosing when appropriate to reduce radiation dose to as low as reasonably achievable. Xr Chest Portable    Result Date: 9/1/2018  Portable chest radiograph History: Intubation. Technique: AP portable view of the chest obtained. Comparison: Chest radiograph from September 1, 2018, 1045 hours Findings: Interval placement of an endotracheal tube, its tip terminating at the level of the clavicles.  Interval placement of an enteric tube, which abruptly curves just proximal to the diaphragm likely secondary to the patient's hiatal hernia. Atherosclerotic ossification of the thoracic aorta. Continued mediastinal silhouette is within normal limits. Patchy bilateral opacities within each lung base. A pneumothorax or pleural effusion. Interval placement of endotracheal tube, terminating at the level of the clavicles. Enteric tube is present and abruptly turns proximal diaphragm likely due to the patient's hiatal hernia. Subsegmental atelectasis versus small infiltrate in both lung bases. Xr Chest Portable    Result Date: 9/1/2018  Portable chest radiograph History: Shortness of breath Technique: AP portable view of the chest obtained. Comparison: Chest x-ray from August 30, 2018 Findings: Atherosclerotic calcification of the thoracic aorta. Surgical clips project over the cardiac silhouette. The cardiomediastinal silhouette is within normal limits. No pneumothorax, pleural effusion, or focal consolidation. Osseous structures of the thorax  are intact. IMPRESSION: No acute intrathoracic process. Xr Chest Portable    Result Date: 9/1/2018  EXAMINATION: PORTABLE CHEST X-RAY FROM 9/1/2018 AT 10:45 HOURS CLINICAL HISTORY: CHECK RIGHT CENTRAL VENOUS LINE PLACEMENT COMPARISONS: 8/31/2018 FINDINGS: There is borderline cardiomegaly. There is atherosclerotic tortuous aorta. The distal tip of the right central venous catheter is extending into the right side of the neck and presumably within the right internal jugular vein and the right CVC should be repositioned. There is no pneumothorax. There is subsegmental atelectasis or developing small streaky infiltrate in the left lung base. Rounded lucency superimposed on the left cardiac silhouette may very well represent a hiatal hernia. There is degenerative bone spurring throughout the spine. 1. DISTAL TIP OF RIGHT CENTRAL VENOUS LINE IS IN RIGHT SIDE OF THE NECK AND RIGHT CVC SHOULD BE REPOSITIONED. 2. NO EVIDENCE OF A PNEUMOTHORAX POST RIGHT CVC LINE PLACEMENT.  3. SUSPECT

## 2018-09-13 ENCOUNTER — APPOINTMENT (OUTPATIENT)
Dept: GENERAL RADIOLOGY | Age: 73
DRG: 466 | End: 2018-09-13
Payer: MEDICAID

## 2018-09-13 LAB
ALBUMIN SERPL-MCNC: 2.3 G/DL (ref 3.9–4.9)
ALP BLD-CCNC: 75 U/L (ref 40–130)
ALT SERPL-CCNC: <5 U/L (ref 0–33)
ANION GAP SERPL CALCULATED.3IONS-SCNC: 11 MEQ/L (ref 7–13)
AST SERPL-CCNC: 10 U/L (ref 0–35)
BILIRUB SERPL-MCNC: 0.4 MG/DL (ref 0–1.2)
BUN BLDV-MCNC: 34 MG/DL (ref 8–23)
CALCIUM SERPL-MCNC: 9.2 MG/DL (ref 8.6–10.2)
CHLORIDE BLD-SCNC: 99 MEQ/L (ref 98–107)
CO2: 29 MEQ/L (ref 22–29)
CREAT SERPL-MCNC: 1.53 MG/DL (ref 0.5–0.9)
GFR AFRICAN AMERICAN: 40.1
GFR NON-AFRICAN AMERICAN: 33.1
GLOBULIN: 3.8 G/DL (ref 2.3–3.5)
GLUCOSE BLD-MCNC: 119 MG/DL (ref 60–115)
GLUCOSE BLD-MCNC: 136 MG/DL (ref 74–109)
GLUCOSE BLD-MCNC: 164 MG/DL (ref 60–115)
GLUCOSE BLD-MCNC: 176 MG/DL (ref 60–115)
GLUCOSE BLD-MCNC: 250 MG/DL (ref 60–115)
HCT VFR BLD CALC: 25.8 % (ref 37–47)
HEMOGLOBIN: 8.3 G/DL (ref 12–16)
LACTIC ACID: 1.1 MMOL/L (ref 0.5–2.2)
MAGNESIUM: 2.1 MG/DL (ref 1.7–2.3)
MCH RBC QN AUTO: 25.6 PG (ref 27–31.3)
MCHC RBC AUTO-ENTMCNC: 32.3 % (ref 33–37)
MCV RBC AUTO: 79.4 FL (ref 82–100)
PDW BLD-RTO: 20.7 % (ref 11.5–14.5)
PERFORMED ON: ABNORMAL
PLATELET # BLD: 298 K/UL (ref 130–400)
POTASSIUM SERPL-SCNC: 4.3 MEQ/L (ref 3.5–5.1)
RBC # BLD: 3.25 M/UL (ref 4.2–5.4)
SODIUM BLD-SCNC: 139 MEQ/L (ref 132–144)
TOTAL PROTEIN: 6.1 G/DL (ref 6.4–8.1)
TROPONIN: 0.06 NG/ML (ref 0–0.01)
TROPONIN: 0.07 NG/ML (ref 0–0.01)
WBC # BLD: 13.2 K/UL (ref 4.8–10.8)

## 2018-09-13 PROCEDURE — 85027 COMPLETE CBC AUTOMATED: CPT

## 2018-09-13 PROCEDURE — 6370000000 HC RX 637 (ALT 250 FOR IP): Performed by: INTERNAL MEDICINE

## 2018-09-13 PROCEDURE — 2580000003 HC RX 258: Performed by: INTERNAL MEDICINE

## 2018-09-13 PROCEDURE — 36620 INSERTION CATHETER ARTERY: CPT

## 2018-09-13 PROCEDURE — 71045 X-RAY EXAM CHEST 1 VIEW: CPT

## 2018-09-13 PROCEDURE — 2580000003 HC RX 258

## 2018-09-13 PROCEDURE — 2000000000 HC ICU R&B

## 2018-09-13 PROCEDURE — 6360000002 HC RX W HCPCS: Performed by: INTERNAL MEDICINE

## 2018-09-13 PROCEDURE — 84484 ASSAY OF TROPONIN QUANT: CPT

## 2018-09-13 PROCEDURE — 99232 SBSQ HOSP IP/OBS MODERATE 35: CPT | Performed by: INTERNAL MEDICINE

## 2018-09-13 PROCEDURE — 6370000000 HC RX 637 (ALT 250 FOR IP): Performed by: PSYCHIATRY & NEUROLOGY

## 2018-09-13 PROCEDURE — 36556 INSERT NON-TUNNEL CV CATH: CPT

## 2018-09-13 PROCEDURE — 2700000000 HC OXYGEN THERAPY PER DAY

## 2018-09-13 PROCEDURE — 36415 COLL VENOUS BLD VENIPUNCTURE: CPT

## 2018-09-13 PROCEDURE — 99291 CRITICAL CARE FIRST HOUR: CPT | Performed by: INTERNAL MEDICINE

## 2018-09-13 PROCEDURE — 87086 URINE CULTURE/COLONY COUNT: CPT

## 2018-09-13 PROCEDURE — 2500000003 HC RX 250 WO HCPCS: Performed by: INTERNAL MEDICINE

## 2018-09-13 PROCEDURE — 6370000000 HC RX 637 (ALT 250 FOR IP): Performed by: NURSE PRACTITIONER

## 2018-09-13 PROCEDURE — 83735 ASSAY OF MAGNESIUM: CPT

## 2018-09-13 PROCEDURE — 2580000003 HC RX 258: Performed by: NURSE PRACTITIONER

## 2018-09-13 PROCEDURE — 83605 ASSAY OF LACTIC ACID: CPT

## 2018-09-13 PROCEDURE — 80053 COMPREHEN METABOLIC PANEL: CPT

## 2018-09-13 RX ORDER — SODIUM CHLORIDE 0.9 % (FLUSH) 0.9 %
10 SYRINGE (ML) INJECTION EVERY 12 HOURS SCHEDULED
Status: DISCONTINUED | OUTPATIENT
Start: 2018-09-13 | End: 2018-09-18 | Stop reason: HOSPADM

## 2018-09-13 RX ORDER — SODIUM CHLORIDE 0.9 % (FLUSH) 0.9 %
10 SYRINGE (ML) INJECTION PRN
Status: DISCONTINUED | OUTPATIENT
Start: 2018-09-13 | End: 2018-09-18 | Stop reason: HOSPADM

## 2018-09-13 RX ORDER — SODIUM CHLORIDE 9 MG/ML
INJECTION, SOLUTION INTRAVENOUS
Status: COMPLETED
Start: 2018-09-13 | End: 2018-09-13

## 2018-09-13 RX ORDER — SODIUM CHLORIDE 9 MG/ML
INJECTION, SOLUTION INTRAVENOUS CONTINUOUS
Status: DISCONTINUED | OUTPATIENT
Start: 2018-09-13 | End: 2018-09-13

## 2018-09-13 RX ORDER — SODIUM CHLORIDE, SODIUM LACTATE, POTASSIUM CHLORIDE, CALCIUM CHLORIDE 600; 310; 30; 20 MG/100ML; MG/100ML; MG/100ML; MG/100ML
INJECTION, SOLUTION INTRAVENOUS CONTINUOUS
Status: DISCONTINUED | OUTPATIENT
Start: 2018-09-13 | End: 2018-09-14 | Stop reason: ALTCHOICE

## 2018-09-13 RX ADMIN — INSULIN LISPRO 3 UNITS: 100 INJECTION, SOLUTION INTRAVENOUS; SUBCUTANEOUS at 12:32

## 2018-09-13 RX ADMIN — CIPROFLOXACIN 400 MG: 2 INJECTION, SOLUTION INTRAVENOUS at 20:45

## 2018-09-13 RX ADMIN — PIPERACILLIN SODIUM, TAZOBACTAM SODIUM 3.38 G: 3; .375 INJECTION, POWDER, LYOPHILIZED, FOR SOLUTION INTRAVENOUS at 10:26

## 2018-09-13 RX ADMIN — VANCOMYCIN HYDROCHLORIDE 750 MG: 750 INJECTION, POWDER, LYOPHILIZED, FOR SOLUTION INTRAVENOUS at 11:37

## 2018-09-13 RX ADMIN — MEROPENEM 1 G: 1 INJECTION, POWDER, FOR SOLUTION INTRAVENOUS at 18:51

## 2018-09-13 RX ADMIN — Medication 10 ML: at 10:11

## 2018-09-13 RX ADMIN — SODIUM CHLORIDE 1000 ML: 9 INJECTION, SOLUTION INTRAVENOUS at 01:00

## 2018-09-13 RX ADMIN — CIPROFLOXACIN 400 MG: 2 INJECTION, SOLUTION INTRAVENOUS at 10:04

## 2018-09-13 RX ADMIN — SODIUM CHLORIDE 500 ML: 9 INJECTION, SOLUTION INTRAVENOUS at 01:49

## 2018-09-13 RX ADMIN — DAPTOMYCIN 220 MG: 500 INJECTION, POWDER, LYOPHILIZED, FOR SOLUTION INTRAVENOUS at 20:45

## 2018-09-13 RX ADMIN — DOCUSATE SODIUM 100 MG: 50 LIQUID ORAL at 10:03

## 2018-09-13 RX ADMIN — MODAFINIL 100 MG: 100 TABLET ORAL at 10:04

## 2018-09-13 RX ADMIN — SODIUM CHLORIDE, POTASSIUM CHLORIDE, SODIUM LACTATE AND CALCIUM CHLORIDE: 600; 310; 30; 20 INJECTION, SOLUTION INTRAVENOUS at 11:58

## 2018-09-13 RX ADMIN — PANTOPRAZOLE SODIUM 40 MG: 40 GRANULE, DELAYED RELEASE ORAL at 10:03

## 2018-09-13 RX ADMIN — SODIUM CHLORIDE: 9 INJECTION, SOLUTION INTRAVENOUS at 10:05

## 2018-09-13 RX ADMIN — FOLIC ACID 1 MG: 1 TABLET ORAL at 10:04

## 2018-09-13 RX ADMIN — MINERAL SUPPLEMENT IRON 300 MG / 5 ML STRENGTH LIQUID 100 PER BOX UNFLAVORED 300 MG: at 10:03

## 2018-09-13 RX ADMIN — NOREPINEPHRINE BITARTRATE 4 MCG/MIN: 1 INJECTION INTRAVENOUS at 01:05

## 2018-09-13 RX ADMIN — MODAFINIL 100 MG: 100 TABLET ORAL at 16:25

## 2018-09-13 RX ADMIN — MEMANTINE HYDROCHLORIDE 5 MG: 5 TABLET ORAL at 10:04

## 2018-09-13 RX ADMIN — ENOXAPARIN SODIUM 40 MG: 40 INJECTION SUBCUTANEOUS at 20:46

## 2018-09-13 RX ADMIN — Medication 500 MG: at 10:04

## 2018-09-13 ASSESSMENT — PAIN SCALES - GENERAL
PAINLEVEL_OUTOF10: 0

## 2018-09-13 NOTE — PROGRESS NOTES
PRN Hollice Villareal, APRN - CNP   10 mL at 09/09/18 0044    magnesium hydroxide (MILK OF MAGNESIA) 400 MG/5ML suspension 30 mL  30 mL Oral Daily PRN Hollice Villareal, APRN - CNP        ondansetron (ZOFRAN) injection 4 mg  4 mg Intravenous Q6H PRN Hollice Villareal, APRN - CNP   4 mg at 09/01/18 0928    glucose (GLUTOSE) 40 % oral gel 15 g  15 g Oral PRN Hollice Villareal, APRN - CNP        dextrose 50 % solution 12.5 g  12.5 g Intravenous PRN Hollice Villareal, APRN - CNP        glucagon (rDNA) injection 1 mg  1 mg Intramuscular PRN Hollice Villareal, APRN - CNP        dextrose 5 % solution  100 mL/hr Intravenous PRN Hollice Villareal, APRN - CNP           Data:  LABS:   Lab Results   Component Value Date     09/13/2018    K 4.3 09/13/2018    K 5.0 09/02/2018    CL 99 09/13/2018    CO2 29 09/13/2018    BUN 34 09/13/2018    CREATININE 1.53 09/13/2018    GFRAA 40.1 09/13/2018    LABGLOM 33.1 09/13/2018    GLUCOSE 136 09/13/2018    PHOS 3.3 09/06/2018    MG 2.1 09/13/2018    CALCIUM 9.2 09/13/2018     Lab Results   Component Value Date    WBC 13.2 09/13/2018    RBC 3.25 09/13/2018    HGB 8.3 09/13/2018    HCT 25.8 09/13/2018    MCV 79.4 09/13/2018    RDW 20.7 09/13/2018     09/13/2018     Lab Results   Component Value Date    INR 1.1 08/31/2018    PROTIME 11.1 08/31/2018     Organism   Date Value Ref Range Status   08/31/2018 Morganella morganii ssp morganii (A)  Final   08/31/2018 Enterococcus faecalis VRE (A)  Final       RADIOLOGY:  Ct Head Wo Contrast    Result Date: 9/1/2018  EXAM: CT SCAN OF THE BRAIN WITHOUT CONTRAST COMPARISON: 5/14/2018 REASONS FOR EXAMINATION:     DIABETES WITH ALTERED MENTAL STATUS TECHNIQUE:  CT brain is obtained without IV contrast agent. FINDINGS: An unenhanced CT scan of the brain demonstrates no evidence of a skull fracture. There is cerebral atrophy and age related findings in the brain. Mild patchy white matter hypoattenuation is likely a sequela of small vessel disease.   There is no effusion, or focal consolidation. Osseous structures of the thorax  are intact. IMPRESSION: No acute intrathoracic process. Xr Chest Portable    Result Date: 9/1/2018  EXAMINATION: PORTABLE CHEST X-RAY FROM 9/1/2018 AT 10:45 HOURS CLINICAL HISTORY: CHECK RIGHT CENTRAL VENOUS LINE PLACEMENT COMPARISONS: 8/31/2018 FINDINGS: There is borderline cardiomegaly. There is atherosclerotic tortuous aorta. The distal tip of the right central venous catheter is extending into the right side of the neck and presumably within the right internal jugular vein and the right CVC should be repositioned. There is no pneumothorax. There is subsegmental atelectasis or developing small streaky infiltrate in the left lung base. Rounded lucency superimposed on the left cardiac silhouette may very well represent a hiatal hernia. There is degenerative bone spurring throughout the spine. 1. DISTAL TIP OF RIGHT CENTRAL VENOUS LINE IS IN RIGHT SIDE OF THE NECK AND RIGHT CVC SHOULD BE REPOSITIONED. 2. NO EVIDENCE OF A PNEUMOTHORAX POST RIGHT CVC LINE PLACEMENT. 3. SUSPECT SUBSEGMENTAL ATELECTASIS OR PERHAPS DEVELOPING SMALL INFILTRATE IN LEFT LUNG BASE. 4. BORDERLINE CARDIOMEGALY AND ATHEROSCLEROTIC TORTUOUS AORTA. Asesment and Plan :  .  Severe sepsis with septic shock. Questionable catheter related versus aspiration pneumonia. Chest x-ray has not been read but it seems to be better than previous chest x-ray on September 4. It could be also recurrent UTI. [place] GN Sepsis. Resolved. cirtobactor and Ecoli. Completed 10 days of cefepime  . Met encephalopathy. Underlying vascular dementia  . Dysphagia failed swallow test for PEG tube today  . Rt heel decubitus ulcer. Podiatry is following  . Anemia. No active bleed. hgb stable  . MALA. . Mod-severe malnutirtion  . Hypernatremia resolved    Recommendations:    Continue with Levophed try to taper it down. Continue with vancomycin and Zosyn. Blood culture. Urine culture.   Check

## 2018-09-13 NOTE — PROGRESS NOTES
Diabetic  · Rate (ml/hr):55 ml/hr    · Volume (ml/day): 1320 ml  · Duration: Continuous 24hrs  · Additives/Modulars:  (nONE)  · TF Residuals: Less than or equal to 250ml  · Water Flushes:  (150 ml every 6 hours)  · Current TF & Flush Orders Provides: 1584 kcal, 79 grams protein,~1660 ml water   · Goal TF & Flush Orders Provides: 1584 kcal, 79 grams protein,~1660 ml water  (currently at goal rate)  · Additional Calories: To re-start EN- 9/13. · Anthropometric Measures:  · Ht: 5' 4\" (162.6 cm)   · Current Body Wt: 180 lb (81.6 kg) (9/13/18)  · Admission Body Wt: 169 lb (76.7 kg)  · Usual Body Wt: 211 lb (95.7 kg) (5/9/18)  · % Weight Change: 15%,  ~ 4 months  · Ideal Body Wt: 120 lb (54.4 kg), % Ideal Body > 100%  · BMI Classification: BMI 30.0 - 34.9 Obese Class I (31.0)  · Comparative Standards (Estimated Nutrition Needs):  · Estimated Daily Total Kcal: 1475 to 1640  · Estimated Daily Protein (g): 99 to 10  · Estimated Daily Fluid (ml/day): As Ordered By Physician. Estimated Intake vs Estimated Needs: Intake Less Than Needs    Nutrition Risk Level: High    Nutrition Interventions:   Modify current Tube Feeding  Continued Inpatient Monitoring, Education Not Indicated    Nutrition Evaluation:   · Evaluation: Goals set   · Goals: EN To Provide > 85% of Estimated Nutrient Needs. BG < 160. · Monitoring: TF Tolerance, Gastric Residuals, Weight, Pertinent Labs, Fluid Balance    See Adult Nutrition Doc Flowsheet for more detail.      Electronically signed by Dwayne Becerra RD, AHSAN on 9/13/18 at 11:59 AM    Contact Number: [phone]

## 2018-09-13 NOTE — PLAN OF CARE
Problem: Nutrition  Goal: Optimal nutrition therapy  Nutrition Problem: Swallowing difficulty  Intervention: Food and/or Nutrient Delivery: Modify current Tube Feeding  Nutritional Goals: EN To Provide > 85% of Estimated Nutrient Needs. BG < 160.     Outcome: Ongoing

## 2018-09-13 NOTE — PROGRESS NOTES
and no masses  Chest : Decreased air movement, with minimal basal rales, no wheezing, nontender, tympanic  Heart[de-identified] Heart sounds are normal.  Regular rate and rhythm without murmur, gallop or rub. ABD:  symmetric, liver span normal to percussion, soft, non-tender, spleen non-palpable, peg no discharges or erythema  , r fen TLC   Musculoskeletal : no cyanosis, no clubbing and no edema  Neuro:  Alert but easily falls asleep, answers questions with okay only does not move much. Skin: No rashes or nodules noted. Lymph node:  no cervical nodes  Urology: Yes Sánchez   Psychiatric: appropriate    Medications:  Scheduled Meds:   piperacillin-tazobactam  3.375 g Intravenous Q8H    vancomycin  750 mg Intravenous Q12H    modafinil  100 mg Oral BID    ciprofloxacin  400 mg Intravenous Q12H    enoxaparin  40 mg Subcutaneous Daily    pantoprazole sodium  40 mg Oral QAM AC    ferrous sulfate  300 mg Oral Daily with breakfast    docusate  100 mg Oral BID    insulin lispro  0-6 Units Subcutaneous 4 times per day    calcium elemental  500 mg Oral Daily    folic acid  1 mg Oral Daily    memantine  5 mg Oral Daily    sodium chloride flush  10 mL Intravenous 2 times per day       PRN Meds:  acetaminophen, ipratropium-albuterol, senna, guaiFENesin, acetaminophen, sodium chloride flush, magnesium hydroxide, ondansetron, glucose, dextrose, glucagon (rDNA), dextrose    Results: reviewed by me   CBC: Recent Labs      09/13/18 0100   WBC  13.2*   HGB  8.3*   HCT  25.8*   MCV  79.4*   PLT  298     BMP: Recent Labs      09/13/18 0100   NA  139   K  4.3   CL  99   CO2  29   BUN  34*   CREATININE  1.53*     LIVER PROFILE:   Recent Labs      09/13/18 0100   AST  10   ALT  <5   BILITOT  0.4   ALKPHOS  75     PT/INR: No results for input(s): PROTIME, INR in the last 72 hours. APTT: No results for input(s): APTT in the last 72 hours.   UA:No results for input(s): NITRITE, COLORU, PHUR, LABCAST, WBCUA, RBCUA, MUCUS, TRICHOMONAS,

## 2018-09-13 NOTE — PROGRESS NOTES
Gram-negative sepsis Citrobacter and E. coli      Patient became febrile and hypotensive last night transferred to the intensive care unit  Placed on Levaquin  Immature 101.1°F  Patient had just completed a 10 day course therapy with cefepime  for gram-negative sepsis      Patient was placed on vancomycin and Zosyn and Cipro      Cultures from blood and urine pending    Patient was treated for gram-negative sepsis with Citrobacter and E. coli repeat urine had shown Morganella and enterococcus or not treated since disease or small number following the initial infection    Physical Exam:    BP (!) 143/53   Pulse 74   Temp 98.3 °F (36.8 °C) (Oral)   Resp 20   Ht 5' 4\" (1.626 m)   Wt 180 lb 1.6 oz (81.7 kg)   SpO2 95%   BMI 30.91 kg/m²   Cardiovascular: Normal heart sounds and intact distal pulses. No murmur heard. Pulmonary/Chest: She has no wheezes. She has no rales. She exhibits no tenderness. Abdominal: Soft. She exhibits no distension and no mass. There is no hepatosplenomegaly, splenomegaly or hepatomegaly. There is no tenderness.  There is no rebound.      CBC [[id number]] (Abnormal) Collected: 09/13/18 0100      Specimen: Blood Updated: 09/13/18 0219      WBC 13.2 (H) K/uL       RBC 3.25 (L) M/uL       Hemoglobin 8.3 (L) g/dL       Hematocrit 25.8 (L) %       MCV 79.4 (L) fL       MCH 25.6 (L) pg       MCHC 32.3 (L) %       RDW 20.7 (H) %       Platelets 437        Performed on ACCU-CHEK      Comprehensive Metabolic Panel [[id number]] (Abnormal) Collected: 09/13/18 0100     Specimen: Blood Updated: 09/13/18 0233      Sodium 139 mEq/L       Potassium 4.3 mEq/L       Chloride 99 mEq/L       CO2 29 mEq/L       Anion Gap 11 mEq/L       Glucose 136 (H) mg/dL       BUN 34 (H) mg/dL       CREATININE 1.53 (H) mg/dL       GFR Non- 33.1 (L)      GFR  40.1 (L)      Calcium 9.2 mg/dL       Total Protein 6.1 (L) g/dL       Alb 2.3 (L) g/dL       Total Bilirubin 0.4 mg/dL

## 2018-09-13 NOTE — PROGRESS NOTES
BP running low, checked multiple times. ...78/30, 89/25, 83/26. Oxygen 100%. Pt has edema throughout all extremities and face. Dr. Mika Tolentino at bedside. Said to transfer pt to ICU and start levophed. Bed assignment #3. Transferring pt.      Electronically signed by Ede Hernandez RN on 9/13/2018 at 12:51 AM

## 2018-09-13 NOTE — PROGRESS NOTES
obtained. Comparison: Chest x-ray from August 30, 2018 Findings: Atherosclerotic calcification of the thoracic aorta. Surgical clips project over the cardiac silhouette. The cardiomediastinal silhouette is within normal limits. No pneumothorax, pleural effusion, or focal consolidation. Osseous structures of the thorax  are intact. IMPRESSION: No acute intrathoracic process. Xr Chest Portable    Result Date: 9/1/2018  EXAMINATION: PORTABLE CHEST X-RAY FROM 9/1/2018 AT 10:45 HOURS CLINICAL HISTORY: CHECK RIGHT CENTRAL VENOUS LINE PLACEMENT COMPARISONS: 8/31/2018 FINDINGS: There is borderline cardiomegaly. There is atherosclerotic tortuous aorta. The distal tip of the right central venous catheter is extending into the right side of the neck and presumably within the right internal jugular vein and the right CVC should be repositioned. There is no pneumothorax. There is subsegmental atelectasis or developing small streaky infiltrate in the left lung base. Rounded lucency superimposed on the left cardiac silhouette may very well represent a hiatal hernia. There is degenerative bone spurring throughout the spine. 1. DISTAL TIP OF RIGHT CENTRAL VENOUS LINE IS IN RIGHT SIDE OF THE NECK AND RIGHT CVC SHOULD BE REPOSITIONED. 2. NO EVIDENCE OF A PNEUMOTHORAX POST RIGHT CVC LINE PLACEMENT. 3. SUSPECT SUBSEGMENTAL ATELECTASIS OR PERHAPS DEVELOPING SMALL INFILTRATE IN LEFT LUNG BASE. 4. BORDERLINE CARDIOMEGALY AND ATHEROSCLEROTIC TORTUOUS AORTA. Recent Labs      09/07/18   0542  09/09/18   0600   WBC  7.4  5.1   HGB  8.9*  9.0*   PLT  89*  163     No results for input(s): BILITOT, ALKPHOS, AST, ALT in the last 72 hours. Lab Results   Component Value Date    PROTIME 11.1 08/31/2018    INR 1.1 08/31/2018     Lab Results   Component Value Date    VALPROATE 9.7 08/17/2016       ASSESSMENT AND PLAN    Encephalopathy,still intermittent issues/ more awake today   metabolic/infective from urosepsis, had alfaro. Pneumonia with resolved respiratory failure that had required intubation. Had HIT, platelet count recovering. Acute anemia improving. Renal function improving   MRI, no new stroke but shows significant bilateral hippocampal atrophy with moderate global atorphy. Mixed Alzheimer's and vascular dementia  Continue memantine 5 mg  Edentulous and very dysarthric  To continue the supportive measures  Needs outpatient neurology follow up for Alzheimer's disease. Watch for CNS infection  PEG done  Will add provigil to see if helps  Met with daughter and cleared the confusion of the weekend    Ogcornel Hughes MD, Jenny Urena, American Board of Psychiatry & Neurology  Board Certified in Vascular Neurology  Board Certified in Neuromuscular Medicine  Certified in [address]

## 2018-09-13 NOTE — PROCEDURES
PROCEDURE NOTE      PROCEDURE:     Arterial line placement. INDICATIONS:     Continuous hemodynamic monitoring of vital signs. ASSISTANT:        ICU Nurse    ANESTHESIA:     Local infiltration of 1% lidocaine. .    CONSCENT:      Emergent. Timur Mcdonnell PROCEDURAL TECHNIQUE:  Procedure was done using strict aseptic technique. Right femoral site was cleaned with chloraprep and draped. Femoral artery was identified using ultrasound , then Lidocaine 1% was infiltrated locally. Arterial line was inserted, a good blood flow was obtained, after which guidewire was inserted all the way with no resistance. Then the canula was inserted and needle with guidewire was withdrawn. Pulsatile bright red blood flow was observed. The canula was connected to BP monitoring apparatus and a good waveform was noted. Then the canula was secured with 2 stay sutures of 3-0 silk, following which dressing was applied. The patient tolerated the procedure. COMPLICATIONS: No immediate complication.        Nicholas Moore MD.

## 2018-09-13 NOTE — PROGRESS NOTES
metabolic/infective from urosepsis, had alfaro. Pneumonia with resolved respiratory failure that had required intubation. Had HIT, platelet count recovering. Acute anemia improving. Renal function improving   MRI, no new stroke but shows significant bilateral hippocampal atrophy with moderate global atorphy. Mixed Alzheimer's and vascular dementia  Continue memantine 5 mg  Edentulous and very dysarthric  More lethargic  Need to LP , r/o meningitis  To continue the supportive measures  Needs outpatient neurology follow up for Alzheimer's disease. Watch for CNS infection  PEG done  Will add provigil to see if helps  Met with daughter and cleared the confusion of the weekend    Og Walter MD, Candido Kilgore, American Board of Psychiatry & Neurology  Board Certified in Vascular Neurology  Board Certified in Neuromuscular Medicine  Certified in . [place]

## 2018-09-13 NOTE — PROGRESS NOTES
Pharmacy Note  Vancomycin Consult    Catarina Leventhal is a 75 y.o. female started on Vancomycin for septic shock; consult received from Dr. Katherine Karimi to manage therapy. Also receiving the following antibiotics: zosyn. 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 coli sepsis (HCC)    Acute lower UTI    Stupor    Encephalopathy    Hypothermia    Aspiration pneumonia of left lower lobe due to vomit (HCC)    Coffee ground emesis    Type II or unspecified type diabetes mellitus without mention of complication, not stated as uncontrolled    Pseudophakia    Proliferative diabetic retinopathy ([address]    MALA (acute kidney injury) ([address]    UTI (urinary tract infection)    Gram negative sepsis (HCC)       Allergies:  Patient has no known allergies.      Recent Labs      09/13/18   0100   BUN  34*       Recent Labs      09/13/18   0100   CREATININE  1.53*       Recent Labs      09/13/18   0100   WBC  13.2*         Intake/Output Summary (Last 24 hours) at 09/13/18 1349  Last data filed at 09/13/18 1563   Gross per 24 hour   Intake 1762.8 ml   Output 600 ml   Net 1162.8 ml       Culture Date      Source                       Results  Urine Culture [[id number]] Collected: 09/13/18 1211   Order Status: Sent Specimen: Urine, clean catch Updated: 09/13/18 1212   Culture Blood #1 [[id number]] Collected: 09/12/18 2239   Order Status: Sent Specimen: Blood from Blood Updated: 09/12/18 2240   Culture Blood #2 [[id number]] Collected: 09/12/18 2239   Order Status: Sent Specimen: Blood Updated: 09/12/18 2239   Culture Blood hours after first dose due to lower first dose. Timing of trough level will be determined based on culture results, renal function, and clinical response. Obtain TR on 9/16 @ 7058    Thank you for the consult. Will continue to follow.     Sasha Henning, PharmD  Staff Pharmacist  9/13/2018 1:53 PM

## 2018-09-13 NOTE — PROGRESS NOTES
Speech-Language Pathology      NAME:  Macario Brewster: [id number]  :  1945  DATE: 2018    This patient was being seen by Speech Therapy for:    []  Speech/Language Treatment  [x]  Dysphagia Treatment  []  Cognitive Treatment  []  Other:      However, due to this patient's change in medical status, Speech Therapy will require new orders to continue to follow the patient. Please re-order, as needed. Thank you,  Carmen Chapin, Date: 2018, Time: 8:46 AM

## 2018-09-14 ENCOUNTER — APPOINTMENT (OUTPATIENT)
Dept: INTERVENTIONAL RADIOLOGY/VASCULAR | Age: 73
DRG: 466 | End: 2018-09-14
Payer: MEDICAID

## 2018-09-14 LAB
ALBUMIN SERPL-MCNC: 2.3 G/DL (ref 3.9–4.9)
ANION GAP SERPL CALCULATED.3IONS-SCNC: 12 MEQ/L (ref 7–13)
APTT: 32 SEC (ref 21.6–35.4)
BUN BLDV-MCNC: 19 MG/DL (ref 8–23)
CALCIUM SERPL-MCNC: 9.2 MG/DL (ref 8.6–10.2)
CHLORIDE BLD-SCNC: 102 MEQ/L (ref 98–107)
CO2: 27 MEQ/L (ref 22–29)
CREAT SERPL-MCNC: 0.67 MG/DL (ref 0.5–0.9)
GFR AFRICAN AMERICAN: >60
GFR NON-AFRICAN AMERICAN: >60
GLUCOSE BLD-MCNC: 125 MG/DL (ref 74–109)
GLUCOSE BLD-MCNC: 136 MG/DL (ref 60–115)
GLUCOSE BLD-MCNC: 156 MG/DL (ref 60–115)
GLUCOSE BLD-MCNC: 158 MG/DL (ref 60–115)
GLUCOSE BLD-MCNC: 69 MG/DL (ref 60–115)
HCT VFR BLD CALC: 26.1 % (ref 37–47)
HEMOGLOBIN: 8.4 G/DL (ref 12–16)
INR BLD: 1.1
MCH RBC QN AUTO: 25.4 PG (ref 27–31.3)
MCHC RBC AUTO-ENTMCNC: 32.2 % (ref 33–37)
MCV RBC AUTO: 78.9 FL (ref 82–100)
ORGANISM: ABNORMAL
PDW BLD-RTO: 20.6 % (ref 11.5–14.5)
PERFORMED ON: ABNORMAL
PERFORMED ON: NORMAL
PHOSPHORUS: 2.7 MG/DL (ref 2.5–4.5)
PLATELET # BLD: 326 K/UL (ref 130–400)
POTASSIUM SERPL-SCNC: 3.7 MEQ/L (ref 3.5–5.1)
PROTHROMBIN TIME: 12 SEC (ref 9.6–12.3)
RBC # BLD: 3.31 M/UL (ref 4.2–5.4)
SODIUM BLD-SCNC: 141 MEQ/L (ref 132–144)
URINE CULTURE, ROUTINE: ABNORMAL
URINE CULTURE, ROUTINE: ABNORMAL
WBC # BLD: 11.4 K/UL (ref 4.8–10.8)

## 2018-09-14 PROCEDURE — 99232 SBSQ HOSP IP/OBS MODERATE 35: CPT | Performed by: INTERNAL MEDICINE

## 2018-09-14 PROCEDURE — 2580000003 HC RX 258: Performed by: PSYCHIATRY & NEUROLOGY

## 2018-09-14 PROCEDURE — 2580000003 HC RX 258: Performed by: INTERNAL MEDICINE

## 2018-09-14 PROCEDURE — 85027 COMPLETE CBC AUTOMATED: CPT

## 2018-09-14 PROCEDURE — 77001 FLUOROGUIDE FOR VEIN DEVICE: CPT | Performed by: RADIOLOGY

## 2018-09-14 PROCEDURE — 2500000003 HC RX 250 WO HCPCS: Performed by: INTERNAL MEDICINE

## 2018-09-14 PROCEDURE — 2580000003 HC RX 258: Performed by: NURSE PRACTITIONER

## 2018-09-14 PROCEDURE — 6370000000 HC RX 637 (ALT 250 FOR IP): Performed by: PSYCHIATRY & NEUROLOGY

## 2018-09-14 PROCEDURE — 85730 THROMBOPLASTIN TIME PARTIAL: CPT

## 2018-09-14 PROCEDURE — 2580000003 HC RX 258

## 2018-09-14 PROCEDURE — 6360000002 HC RX W HCPCS: Performed by: INTERNAL MEDICINE

## 2018-09-14 PROCEDURE — 6370000000 HC RX 637 (ALT 250 FOR IP): Performed by: INTERNAL MEDICINE

## 2018-09-14 PROCEDURE — 02HV33Z INSERTION OF INFUSION DEVICE INTO SUPERIOR VENA CAVA, PERCUTANEOUS APPROACH: ICD-10-PCS | Performed by: RADIOLOGY

## 2018-09-14 PROCEDURE — 2700000000 HC OXYGEN THERAPY PER DAY

## 2018-09-14 PROCEDURE — 36569 INSJ PICC 5 YR+ W/O IMAGING: CPT | Performed by: RADIOLOGY

## 2018-09-14 PROCEDURE — 6370000000 HC RX 637 (ALT 250 FOR IP): Performed by: NURSE PRACTITIONER

## 2018-09-14 PROCEDURE — 2709999900 IR PICC WO SQ PORT/PUMP > 5 YEARS

## 2018-09-14 PROCEDURE — 85610 PROTHROMBIN TIME: CPT

## 2018-09-14 PROCEDURE — 80069 RENAL FUNCTION PANEL: CPT

## 2018-09-14 PROCEDURE — 76937 US GUIDE VASCULAR ACCESS: CPT | Performed by: RADIOLOGY

## 2018-09-14 PROCEDURE — 2000000000 HC ICU R&B

## 2018-09-14 RX ORDER — SODIUM CHLORIDE 9 MG/ML
250 INJECTION, SOLUTION INTRAVENOUS ONCE
Status: COMPLETED | OUTPATIENT
Start: 2018-09-14 | End: 2018-09-14

## 2018-09-14 RX ORDER — FLUCONAZOLE 100 MG/1
100 TABLET ORAL DAILY
Status: DISCONTINUED | OUTPATIENT
Start: 2018-09-14 | End: 2018-09-18 | Stop reason: HOSPADM

## 2018-09-14 RX ORDER — 0.9 % SODIUM CHLORIDE 0.9 %
250 INTRAVENOUS SOLUTION INTRAVENOUS ONCE
Status: COMPLETED | OUTPATIENT
Start: 2018-09-14 | End: 2018-09-14

## 2018-09-14 RX ORDER — LIDOCAINE HYDROCHLORIDE 20 MG/ML
5 INJECTION, SOLUTION INFILTRATION; PERINEURAL ONCE
Status: COMPLETED | OUTPATIENT
Start: 2018-09-14 | End: 2018-09-14

## 2018-09-14 RX ORDER — SODIUM CHLORIDE 0.9 % (FLUSH) 0.9 %
10 SYRINGE (ML) INJECTION EVERY 12 HOURS SCHEDULED
Status: DISCONTINUED | OUTPATIENT
Start: 2018-09-14 | End: 2018-09-18 | Stop reason: HOSPADM

## 2018-09-14 RX ORDER — SODIUM CHLORIDE 9 MG/ML
INJECTION, SOLUTION INTRAVENOUS
Status: COMPLETED
Start: 2018-09-14 | End: 2018-09-14

## 2018-09-14 RX ORDER — SODIUM CHLORIDE 0.9 % (FLUSH) 0.9 %
10 SYRINGE (ML) INJECTION PRN
Status: DISCONTINUED | OUTPATIENT
Start: 2018-09-14 | End: 2018-09-18 | Stop reason: HOSPADM

## 2018-09-14 RX ADMIN — Medication 10 ML: at 09:00

## 2018-09-14 RX ADMIN — FLUCONAZOLE 100 MG: 100 TABLET ORAL at 16:58

## 2018-09-14 RX ADMIN — MODAFINIL 100 MG: 100 TABLET ORAL at 16:58

## 2018-09-14 RX ADMIN — MEROPENEM 1 G: 1 INJECTION, POWDER, FOR SOLUTION INTRAVENOUS at 10:42

## 2018-09-14 RX ADMIN — MEMANTINE HYDROCHLORIDE 5 MG: 5 TABLET ORAL at 10:30

## 2018-09-14 RX ADMIN — MEROPENEM 1 G: 1 INJECTION, POWDER, FOR SOLUTION INTRAVENOUS at 16:58

## 2018-09-14 RX ADMIN — CIPROFLOXACIN 400 MG: 2 INJECTION, SOLUTION INTRAVENOUS at 10:29

## 2018-09-14 RX ADMIN — FOLIC ACID 1 MG: 1 TABLET ORAL at 10:30

## 2018-09-14 RX ADMIN — MEROPENEM 1 G: 1 INJECTION, POWDER, FOR SOLUTION INTRAVENOUS at 01:21

## 2018-09-14 RX ADMIN — MINERAL SUPPLEMENT IRON 300 MG / 5 ML STRENGTH LIQUID 100 PER BOX UNFLAVORED 300 MG: at 10:29

## 2018-09-14 RX ADMIN — Medication 250 ML: at 19:30

## 2018-09-14 RX ADMIN — LIDOCAINE HYDROCHLORIDE 5 ML: 20 INJECTION, SOLUTION INFILTRATION; PERINEURAL at 15:08

## 2018-09-14 RX ADMIN — PANTOPRAZOLE SODIUM 40 MG: 40 GRANULE, DELAYED RELEASE ORAL at 05:54

## 2018-09-14 RX ADMIN — Medication 500 MG: at 10:30

## 2018-09-14 RX ADMIN — MODAFINIL 100 MG: 100 TABLET ORAL at 10:30

## 2018-09-14 RX ADMIN — ENOXAPARIN SODIUM 40 MG: 40 INJECTION SUBCUTANEOUS at 16:57

## 2018-09-14 RX ADMIN — NOREPINEPHRINE BITARTRATE 2 MCG/MIN: 1 INJECTION INTRAVENOUS at 20:45

## 2018-09-14 RX ADMIN — ENOXAPARIN SODIUM 40 MG: 40 INJECTION SUBCUTANEOUS at 21:59

## 2018-09-14 RX ADMIN — INSULIN LISPRO 1 UNITS: 100 INJECTION, SOLUTION INTRAVENOUS; SUBCUTANEOUS at 13:44

## 2018-09-14 RX ADMIN — SODIUM CHLORIDE 250 ML: 9 INJECTION, SOLUTION INTRAVENOUS at 15:08

## 2018-09-14 RX ADMIN — Medication 10 ML: at 20:13

## 2018-09-14 RX ADMIN — CIPROFLOXACIN 400 MG: 2 INJECTION, SOLUTION INTRAVENOUS at 21:59

## 2018-09-14 RX ADMIN — INSULIN LISPRO 1 UNITS: 100 INJECTION, SOLUTION INTRAVENOUS; SUBCUTANEOUS at 01:26

## 2018-09-14 RX ADMIN — DAPTOMYCIN 220 MG: 500 INJECTION, POWDER, LYOPHILIZED, FOR SOLUTION INTRAVENOUS at 20:06

## 2018-09-14 RX ADMIN — SODIUM CHLORIDE 250 ML: 9 INJECTION, SOLUTION INTRAVENOUS at 19:30

## 2018-09-14 RX ADMIN — Medication 10 ML: at 11:00

## 2018-09-14 ASSESSMENT — PAIN SCALES - GENERAL
PAINLEVEL_OUTOF10: 0

## 2018-09-14 NOTE — FLOWSHEET NOTE
PT.'S DAUGHTER, MEREDITH JONES, HAS SPOKEN WITH SIBLINGS REGARDING LUMBAR PUNCTURE, WHICH IS SCHEDULED FOR HER MOTHER. CHILDREN HAVE DECIDED AGAINST HAVING THE LUMBAR PUNCTURE DONE, ON THEIR MOTHER.

## 2018-09-14 NOTE — PROGRESS NOTES
Hospitalist  Follow-up      Date of Service: 9/14/2018    Subjective:   No change of mental status compared with baseline. Off Levophed. Patient had PICC line inserted. Past Medical History:   Diagnosis Date    Alzheimer disease     Diabetes mellitus ([address]     GERD (gastroesophageal reflux disease)     Hypertension     Osteoarthritis     Retinopathy      family history is not on file. reports that she has never smoked. She has never used smokeless tobacco. She reports that she does not drink alcohol or use drugs. Case DW RN       Objective:  Exam:  BP (!) 143/53   Pulse 70   Temp 98.1 °F (36.7 °C) (Oral)   Resp 19   Ht 5' 4\" (1.626 m)   Wt 182 lb 15.7 oz (83 kg)   SpO2 100%   BMI 31.41 kg/m²     Physical Exam:  Gen a-  awake confused. No resp distress  HEENT - PERRLA, EOMI  CV - RRR no m/G/R, normal s1, s2  Lungs - CTA bilaterally anteriorly  Abd - soft, NT, ND. CVC noted in rt groin no signs of bleeding or infection. Sánchez in place. Stage II sacral ulcer no signs of infection. Patient had bowel movement. Ext - no cyanosis or edema.  Rt heel ulcer  Skin - decubis ulcer stage 2 no signs of infection    Medications:  Current Facility-Administered Medications   Medication Dose Route Frequency Provider Last Rate Last Dose    [START ON 9/15/2018] docusate (COLACE) 50 MG/5ML liquid 100 mg  100 mg Oral Daily Kiesha Olsen MD        sodium chloride flush 0.9 % injection 10 mL  10 mL Intravenous 2 times per day Kiesha Olsen MD   10 mL at 09/14/18 1100    sodium chloride flush 0.9 % injection 10 mL  10 mL Intravenous PRN Kiesha Olsen MD        fluconazole (DIFLUCAN) tablet 100 mg  100 mg Oral Daily Claude Franklin MD        sodium chloride flush 0.9 % injection 10 mL  10 mL Intravenous 2 times per day Kayleigh Flanagan MD   10 mL at 09/14/18 0900    sodium chloride flush 0.9 % injection 10 mL  10 mL Intravenous PRN Kayleigh Flanagan MD        daptomycin (CUBICIN) 220 mg in There is no acute CVA. There is no acute intra-axial or extra-axial findings in the brain. There is no intracranial mass, hemorrhage, \"mass effect\" or midline shift. The remainder of the CT scan of the brain appears unremarkable. No significant change since the 5/14/2018 CT brain. 1. CEREBRAL ATROPHY AND AGE RELATED FINDINGS IN THE BRAIN. 2. NO ACUTE INTRA-AXIAL OR EXTRA-AXIAL FINDINGS IN THE BRAIN. 3. NO SIGNIFICANT CHANGE SINCE 5/14/2018 CT BRAIN. All CT scans at this facility use dose modulation, iterative reconstruction, and/or weight based dosing when appropriate to reduce radiation dose to as low as reasonably achievable. Xr Chest Portable    Result Date: 9/1/2018  Portable chest radiograph History: Intubation. Technique: AP portable view of the chest obtained. Comparison: Chest radiograph from September 1, 2018, 1045 hours Findings: Interval placement of an endotracheal tube, its tip terminating at the level of the clavicles. Interval placement of an enteric tube, which abruptly curves just proximal to the diaphragm likely secondary to the patient's hiatal hernia. Atherosclerotic ossification of the thoracic aorta. Continued mediastinal silhouette is within normal limits. Patchy bilateral opacities within each lung base. A pneumothorax or pleural effusion. Interval placement of endotracheal tube, terminating at the level of the clavicles. Enteric tube is present and abruptly turns proximal diaphragm likely due to the patient's hiatal hernia. Subsegmental atelectasis versus small infiltrate in both lung bases. Xr Chest Portable    Result Date: 9/1/2018  Portable chest radiograph History: Shortness of breath Technique: AP portable view of the chest obtained. Comparison: Chest x-ray from August 30, 2018 Findings: Atherosclerotic calcification of the thoracic aorta. Surgical clips project over the cardiac silhouette. The cardiomediastinal silhouette is within normal limits.  No pneumothorax, pleural effusion, or focal consolidation. Osseous structures of the thorax  are intact. IMPRESSION: No acute intrathoracic process. Xr Chest Portable    Result Date: 9/1/2018  EXAMINATION: PORTABLE CHEST X-RAY FROM 9/1/2018 AT 10:45 HOURS CLINICAL HISTORY: CHECK RIGHT CENTRAL VENOUS LINE PLACEMENT COMPARISONS: 8/31/2018 FINDINGS: There is borderline cardiomegaly. There is atherosclerotic tortuous aorta. The distal tip of the right central venous catheter is extending into the right side of the neck and presumably within the right internal jugular vein and the right CVC should be repositioned. There is no pneumothorax. There is subsegmental atelectasis or developing small streaky infiltrate in the left lung base. Rounded lucency superimposed on the left cardiac silhouette may very well represent a hiatal hernia. There is degenerative bone spurring throughout the spine. 1. DISTAL TIP OF RIGHT CENTRAL VENOUS LINE IS IN RIGHT SIDE OF THE NECK AND RIGHT CVC SHOULD BE REPOSITIONED. 2. NO EVIDENCE OF A PNEUMOTHORAX POST RIGHT CVC LINE PLACEMENT. 3. SUSPECT SUBSEGMENTAL ATELECTASIS OR PERHAPS DEVELOPING SMALL INFILTRATE IN LEFT LUNG BASE. 4. BORDERLINE CARDIOMEGALY AND ATHEROSCLEROTIC TORTUOUS AORTA. Asesment and Plan :  .  Severe sepsis with septic shock. Questionable catheter related versus aspiration pneumonia. Chest x-ray was reviewed by and  seems to be better than previous chest x-ray on September 4.    .  Candida UTI  . GN Sepsis. Resolved. cirtobactor and Ecoli. Completed 10 days of cefepime  . Met encephalopathy. Underlying vascular dementia  . Dysphagia failed swallow test for PEG tube today  . Rt heel decubitus ulcer. Podiatry is following  . Anemia. No active bleed. hgb stable  . MALA. . Mod-severe malnutirtion  . Hypernatremia resolved  Recommendations:  Continue with Cipro daptomycin and meropenem.  Per id recommendation  Add Diflucan  Wound care  May transfer to the floor      Aneta Lozano

## 2018-09-14 NOTE — PROGRESS NOTES
Pulmonary & Critical Care Medicine ICU Progress Note  Chief complaint : septic shock     Subjunctive/24 hour events :   Patient seen and examined during multidisciplinary rounds with RN, charge nurse, RT, pharmacy, dietitian, and social service. She is awake and interactive, answering questions, denies chest pain or shortness of breath, denies abdominal pain, significantly improved compared to yesterday. Of pressors, no fever, had 2 bowel movements, urine output is good and tolerating tube feed      Social History   Substance Use Topics    Smoking status: Never Smoker    Smokeless tobacco: Never Used    Alcohol use No     History reviewed. No pertinent family history. No results for input(s): PHART, [id number], PO2ART in the last 72 hours. MV Settings:  Vent Mode: CPAP Rate Set: 16 bmp/Vt Ordered: 400 mL/ /FiO2 : 30 %           IV:   sodium chloride      dextrose         Vitals:  BP (!) 143/53   Pulse 69   Temp 98.6 °F (37 °C) (Core)   Resp 18   Ht 5' 4\" (1.626 m)   Wt 182 lb 15.7 oz (83 kg)   SpO2 100%   BMI 31.41 kg/m²    Tmax:        Intake/Output Summary (Last 24 hours) at 09/14/18 1049  Last data filed at 09/14/18 0500   Gross per 24 hour   Intake          2292.39 ml   Output             1400 ml   Net           892.39 ml       EXAM:  General: alert, cooperative, no distress  Head: normocephalic, atraumatic  Eyes:No gross abnormalities. , PERRL and Sclera nonicteric  ENT:  MMM no lesions  Neck:  supple and no masses  Chest : Good air movement, fine basilar rales, no wheezing, nontender, tympanic. Heart[de-identified] Heart sounds are normal.  Regular rate and rhythm without murmur, gallop or rub. ABD:  symmetric, liver span normal to percussion, soft, non-tender, spleen non-palpable, peg no discharges or erythema  , R fem TLC   Musculoskeletal : no cyanosis, no clubbing and no edema  Neuro:  Grossly normal  Skin: No rashes or nodules noted.   Lymph node:  no cervical nodes  Urology: Yes Sánchez

## 2018-09-15 LAB
ALBUMIN SERPL-MCNC: 2.2 G/DL (ref 3.9–4.9)
ANION GAP SERPL CALCULATED.3IONS-SCNC: 9 MEQ/L (ref 7–13)
BUN BLDV-MCNC: 14 MG/DL (ref 8–23)
CALCIUM SERPL-MCNC: 8.8 MG/DL (ref 8.6–10.2)
CHLORIDE BLD-SCNC: 102 MEQ/L (ref 98–107)
CO2: 29 MEQ/L (ref 22–29)
CREAT SERPL-MCNC: 0.47 MG/DL (ref 0.5–0.9)
GFR AFRICAN AMERICAN: >60
GFR NON-AFRICAN AMERICAN: >60
GLUCOSE BLD-MCNC: 125 MG/DL (ref 60–115)
GLUCOSE BLD-MCNC: 145 MG/DL (ref 60–115)
GLUCOSE BLD-MCNC: 184 MG/DL (ref 60–115)
GLUCOSE BLD-MCNC: 192 MG/DL (ref 74–109)
GLUCOSE BLD-MCNC: 201 MG/DL (ref 60–115)
GLUCOSE BLD-MCNC: 211 MG/DL (ref 60–115)
HCT VFR BLD CALC: 25.8 % (ref 37–47)
HEMOGLOBIN: 8.4 G/DL (ref 12–16)
MCH RBC QN AUTO: 25.8 PG (ref 27–31.3)
MCHC RBC AUTO-ENTMCNC: 32.7 % (ref 33–37)
MCV RBC AUTO: 78.7 FL (ref 82–100)
PDW BLD-RTO: 20.5 % (ref 11.5–14.5)
PERFORMED ON: ABNORMAL
PHOSPHORUS: 2.4 MG/DL (ref 2.5–4.5)
PLATELET # BLD: 301 K/UL (ref 130–400)
POTASSIUM SERPL-SCNC: 3.5 MEQ/L (ref 3.5–5.1)
RBC # BLD: 3.28 M/UL (ref 4.2–5.4)
SODIUM BLD-SCNC: 140 MEQ/L (ref 132–144)
WBC # BLD: 6.3 K/UL (ref 4.8–10.8)

## 2018-09-15 PROCEDURE — 6370000000 HC RX 637 (ALT 250 FOR IP): Performed by: INTERNAL MEDICINE

## 2018-09-15 PROCEDURE — 6360000002 HC RX W HCPCS: Performed by: INTERNAL MEDICINE

## 2018-09-15 PROCEDURE — 99232 SBSQ HOSP IP/OBS MODERATE 35: CPT | Performed by: INTERNAL MEDICINE

## 2018-09-15 PROCEDURE — 2580000003 HC RX 258: Performed by: PSYCHIATRY & NEUROLOGY

## 2018-09-15 PROCEDURE — 2700000000 HC OXYGEN THERAPY PER DAY

## 2018-09-15 PROCEDURE — 94668 MNPJ CHEST WALL SBSQ: CPT

## 2018-09-15 PROCEDURE — 6370000000 HC RX 637 (ALT 250 FOR IP): Performed by: PSYCHIATRY & NEUROLOGY

## 2018-09-15 PROCEDURE — 2580000003 HC RX 258: Performed by: INTERNAL MEDICINE

## 2018-09-15 PROCEDURE — 2580000003 HC RX 258: Performed by: NURSE PRACTITIONER

## 2018-09-15 PROCEDURE — 6370000000 HC RX 637 (ALT 250 FOR IP): Performed by: NURSE PRACTITIONER

## 2018-09-15 PROCEDURE — 2000000000 HC ICU R&B

## 2018-09-15 PROCEDURE — 80069 RENAL FUNCTION PANEL: CPT

## 2018-09-15 PROCEDURE — 85027 COMPLETE CBC AUTOMATED: CPT

## 2018-09-15 PROCEDURE — 99233 SBSQ HOSP IP/OBS HIGH 50: CPT | Performed by: INTERNAL MEDICINE

## 2018-09-15 RX ADMIN — PANTOPRAZOLE SODIUM 40 MG: 40 GRANULE, DELAYED RELEASE ORAL at 05:11

## 2018-09-15 RX ADMIN — MEROPENEM 1 G: 1 INJECTION, POWDER, FOR SOLUTION INTRAVENOUS at 17:14

## 2018-09-15 RX ADMIN — Medication 10 ML: at 20:11

## 2018-09-15 RX ADMIN — CIPROFLOXACIN 400 MG: 2 INJECTION, SOLUTION INTRAVENOUS at 22:36

## 2018-09-15 RX ADMIN — INSULIN LISPRO 2 UNITS: 100 INJECTION, SOLUTION INTRAVENOUS; SUBCUTANEOUS at 05:09

## 2018-09-15 RX ADMIN — MODAFINIL 100 MG: 100 TABLET ORAL at 09:18

## 2018-09-15 RX ADMIN — FLUCONAZOLE 100 MG: 100 TABLET ORAL at 09:18

## 2018-09-15 RX ADMIN — Medication 500 MG: at 09:18

## 2018-09-15 RX ADMIN — HYDROCORTISONE SODIUM SUCCINATE 50 MG: 100 INJECTION, POWDER, FOR SOLUTION INTRAMUSCULAR; INTRAVENOUS at 11:21

## 2018-09-15 RX ADMIN — Medication 10 ML: at 09:21

## 2018-09-15 RX ADMIN — FOLIC ACID 1 MG: 1 TABLET ORAL at 09:20

## 2018-09-15 RX ADMIN — DAPTOMYCIN 220 MG: 500 INJECTION, POWDER, LYOPHILIZED, FOR SOLUTION INTRAVENOUS at 18:11

## 2018-09-15 RX ADMIN — MEROPENEM 1 G: 1 INJECTION, POWDER, FOR SOLUTION INTRAVENOUS at 01:16

## 2018-09-15 RX ADMIN — DOCUSATE SODIUM 100 MG: 50 LIQUID ORAL at 09:18

## 2018-09-15 RX ADMIN — INSULIN LISPRO 2 UNITS: 100 INJECTION, SOLUTION INTRAVENOUS; SUBCUTANEOUS at 23:36

## 2018-09-15 RX ADMIN — INSULIN LISPRO 1 UNITS: 100 INJECTION, SOLUTION INTRAVENOUS; SUBCUTANEOUS at 11:22

## 2018-09-15 RX ADMIN — HYDROCORTISONE SODIUM SUCCINATE 50 MG: 100 INJECTION, POWDER, FOR SOLUTION INTRAMUSCULAR; INTRAVENOUS at 20:11

## 2018-09-15 RX ADMIN — Medication 10 ML: at 09:19

## 2018-09-15 RX ADMIN — ENOXAPARIN SODIUM 40 MG: 40 INJECTION SUBCUTANEOUS at 22:37

## 2018-09-15 RX ADMIN — MODAFINIL 100 MG: 100 TABLET ORAL at 12:33

## 2018-09-15 RX ADMIN — CIPROFLOXACIN 400 MG: 2 INJECTION, SOLUTION INTRAVENOUS at 11:20

## 2018-09-15 RX ADMIN — MINERAL SUPPLEMENT IRON 300 MG / 5 ML STRENGTH LIQUID 100 PER BOX UNFLAVORED 300 MG: at 09:18

## 2018-09-15 RX ADMIN — INSULIN LISPRO 1 UNITS: 100 INJECTION, SOLUTION INTRAVENOUS; SUBCUTANEOUS at 17:14

## 2018-09-15 RX ADMIN — MEMANTINE HYDROCHLORIDE 5 MG: 5 TABLET ORAL at 09:18

## 2018-09-15 RX ADMIN — MEROPENEM 1 G: 1 INJECTION, POWDER, FOR SOLUTION INTRAVENOUS at 09:18

## 2018-09-15 ASSESSMENT — PAIN SCALES - GENERAL
PAINLEVEL_OUTOF10: 0

## 2018-09-15 ASSESSMENT — PAIN SCALES - PAIN ASSESSMENT IN ADVANCED DEMENTIA (PAINAD)
NEGVOCALIZATION: 0
FACIALEXPRESSION: 0
FACIALEXPRESSION: 0
TOTALSCORE: 0
BREATHING: 0
BREATHING: 0
TOTALSCORE: 0
NEGVOCALIZATION: 0
BODYLANGUAGE: 0
FACIALEXPRESSION: 0
BREATHING: 0
CONSOLABILITY: 0
FACIALEXPRESSION: 0
TOTALSCORE: 0
CONSOLABILITY: 0
NEGVOCALIZATION: 0
FACIALEXPRESSION: 0
BODYLANGUAGE: 0
BODYLANGUAGE: 0
NEGVOCALIZATION: 0
CONSOLABILITY: 0
BREATHING: 0
BODYLANGUAGE: 0
FACIALEXPRESSION: 0
NEGVOCALIZATION: 0
CONSOLABILITY: 0
BREATHING: 0
BREATHING: 0
TOTALSCORE: 0
FACIALEXPRESSION: 0
CONSOLABILITY: 0
FACIALEXPRESSION: 0
CONSOLABILITY: 0
NEGVOCALIZATION: 0
BREATHING: 0
TOTALSCORE: 0
NEGVOCALIZATION: 0
CONSOLABILITY: 0
TOTALSCORE: 0
BREATHING: 0
BODYLANGUAGE: 0
BREATHING: 0
BREATHING: 0
NEGVOCALIZATION: 0
CONSOLABILITY: 0
CONSOLABILITY: 0
FACIALEXPRESSION: 0
TOTALSCORE: 0
BODYLANGUAGE: 0
NEGVOCALIZATION: 0
BODYLANGUAGE: 0
TOTALSCORE: 0
FACIALEXPRESSION: 0
BREATHING: 0
TOTALSCORE: 0
TOTALSCORE: 0
FACIALEXPRESSION: 0
TOTALSCORE: 0

## 2018-09-15 NOTE — PROGRESS NOTES
09/14/18   0600  09/15/18   0522   NA  139  141  140   K  4.3  3.7  3.5   CL  99  102  102   CO2  29  27  29   BUN  34*  19  14   CREATININE  1.53*  0.67  0.47*   GLUCOSE  136*  125*  192*   CALCIUM  9.2  9.2  8.8   PROT  6.1*   --    --    LABALBU  2.3*  2.3*  2.2*   BILITOT  0.4   --    --    ALKPHOS  75   --    --    AST  10   --    --    ALT  <5   --    --    LABGLOM  33.1*  >60.0  >60.0   GFRAA  40.1*  >60.0  >60.0   GLOB  3.8*   --    --        MV Settings:     Vent Mode: CPAP  Vt Ordered: 400 mL  Rate Set: 16 bmp  FiO2 : 30 %  PEEP/CPAP: 5  Pressure Support: 5 cmH20  Peak Inspiratory Pressure: 13 cmH2O  Mean Airway Pressure: 6.9 cmH20  I:E Ratio: 1:3.20    No results for input(s): PHART, WMU5OBL, PO2ART, FVP3JID, BEART, L0ZRUSLT in the last 72 hours.     O2 Device: Nasal cannula  O2 Flow Rate (L/min): 2 L/min    DIET TUBE FEED CONTINUOUS/CYCLIC NPO; Semi-elemental; Gastrostomy; Continuous; 15; 55; 24     MEDICATIONS during current hospitalization:    Continuous Infusions:   norepinephrine Stopped (09/15/18 0731)    dextrose         Scheduled Meds:   docusate  100 mg Oral Daily    sodium chloride flush  10 mL Intravenous 2 times per day    fluconazole  100 mg Oral Daily    sodium chloride flush  10 mL Intravenous 2 times per day    daptomycin (CUBICIN) IVPB  4 mg/kg (Ideal) Intravenous Q24H    meropenem  1 g Intravenous Q8H    modafinil  100 mg Oral BID    ciprofloxacin  400 mg Intravenous Q12H    enoxaparin  40 mg Subcutaneous Daily    pantoprazole sodium  40 mg Oral QAM AC    ferrous sulfate  300 mg Oral Daily with breakfast    insulin lispro  0-6 Units Subcutaneous 4 times per day    calcium elemental  500 mg Oral Daily    folic acid  1 mg Oral Daily    memantine  5 mg Oral Daily    sodium chloride flush  10 mL Intravenous 2 times per day       PRN Meds:sodium chloride flush, sodium chloride flush, acetaminophen, ipratropium-albuterol, senna, guaiFENesin, acetaminophen, sodium chloride flush, magnesium hydroxide, ondansetron, glucose, dextrose, glucagon (rDNA), dextrose    Radiology  Ct Head Wo Contrast    Result Date: 9/1/2018  EXAM: CT SCAN OF THE BRAIN WITHOUT CONTRAST COMPARISON: 5/14/2018 REASONS FOR EXAMINATION:     DIABETES WITH ALTERED MENTAL STATUS TECHNIQUE:  CT brain is obtained without IV contrast agent. FINDINGS: An unenhanced CT scan of the brain demonstrates no evidence of a skull fracture. There is cerebral atrophy and age related findings in the brain. Mild patchy white matter hypoattenuation is likely a sequela of small vessel disease. There is no acute CVA. There is no acute intra-axial or extra-axial findings in the brain. There is no intracranial mass, hemorrhage, \"mass effect\" or midline shift. The remainder of the CT scan of the brain appears unremarkable. No significant change since the 5/14/2018 CT brain. 1. CEREBRAL ATROPHY AND AGE RELATED FINDINGS IN THE BRAIN. 2. NO ACUTE INTRA-AXIAL OR EXTRA-AXIAL FINDINGS IN THE BRAIN. 3. NO SIGNIFICANT CHANGE SINCE 5/14/2018 CT BRAIN. All CT scans at this facility use dose modulation, iterative reconstruction, and/or weight based dosing when appropriate to reduce radiation dose to as low as reasonably achievable. Lio Vasquez Reas Device Placement    Result Date: 9/14/2018  PERIPHERALLY INSERTED CENTRAL CATHETER (PICC) PLACEMENT WITH ULTRASOUND GUIDANCE CLINICAL HISTORY:  PATIENT WITH UTI AND SEPSIS, PATIENT NEEDS CENTRAL VENOUS ACCESS FOR LONG-TERM IV ANTIBIOTIC THERAPY, PHYSICIAN REQUESTS PLACEMENT OF PICC LINE After discussing the procedure and possible complications with the patient, informed consent was obtained. The patient was placed on the Special Procedures table. The left upper extremity was sterilely prepared using 2% chlorhexidine.  Central venous catheter was inserted using a maximal sterile barrier technique which includes cap, mask, sterile gown, sterile gloves, sterile full-body drape, hand hygiene and 2% chlorhexidine for cutaneous antisepsis. A sterile ultrasound technique with sterile gel and sterile probe covers was also utilized. A pre-procedure time out was performed in order to assure the correct patient and procedure. Local anesthetic was administered. Utilizing sterile gel and sterile probe covers, a peripheral vein was accessed with sonographic guidance. A sonographic spot image was obtained for documentation. A guidewire was advanced into the vein with fluoroscopic guidance and a sheath was placed over the guidewire. A 5-Bahraini triple-lumen PICC was advanced through the sheath, up the arm and into the central vasculature. It was positioned appropriately. The sheath was removed. The catheter was shown to aspirate and infuse properly. The flange of the catheter was affixed to the arm using a PICC securement device. A spot image of the chest showed the tip of the PICC line to lie in the superior vena cava. The patient tolerated the procedure well and without complications. Number of films: 1 Fluoroscopy time: 190 seconds CONCLUSION: SUCCESSFUL LUE PICC PLACEMENT WITHOUT IMMEDIATE COMPLICATIONS. Xr Chest Portable    Result Date: 9/13/2018  EXAMINATION: CHEST PORTABLE VIEW  CLINICAL HISTORY: Pneumonia, follow-up COMPARISONS: September 4, 2018  FINDINGS: 2 views of the chest is submitted. Again note is made of metallic density overlying the cardiac silhouette. Unchanged The cardiac silhouette is enlarged unchanged configuration. .  The mediastinum is unremarkable. Pulmonary vascular remains congested with increased interstitial markings Right sided trachea. There are diffuse patchy multifocal to confluent areas of airspace disease in the right upper right lower and left lower lobe with small left pleural effusion. No Pneumothoraces.                                                                                    PULMONARY VASCULAR REMAINS CONGESTED WITH INCREASED INTERSTITIAL MARKINGS basis of atelectasis and/or infiltrate. Xr Chest Portable    Result Date: 9/1/2018  Portable chest radiograph History: Intubation. Technique: AP portable view of the chest obtained. Comparison: Chest radiograph from September 1, 2018, 1045 hours Findings: Interval placement of an endotracheal tube, its tip terminating at the level of the clavicles. Interval placement of an enteric tube, which abruptly curves just proximal to the diaphragm likely secondary to the patient's hiatal hernia. Atherosclerotic ossification of the thoracic aorta. Continued mediastinal silhouette is within normal limits. Patchy bilateral opacities within each lung base. A pneumothorax or pleural effusion. Interval placement of endotracheal tube, terminating at the level of the clavicles. Enteric tube is present and abruptly turns proximal diaphragm likely due to the patient's hiatal hernia. Subsegmental atelectasis versus small infiltrate in both lung bases. Xr Chest Portable    Result Date: 9/1/2018  Portable chest radiograph History: Shortness of breath Technique: AP portable view of the chest obtained. Comparison: Chest x-ray from August 30, 2018 Findings: Atherosclerotic calcification of the thoracic aorta. Surgical clips project over the cardiac silhouette. The cardiomediastinal silhouette is within normal limits. No pneumothorax, pleural effusion, or focal consolidation. Osseous structures of the thorax  are intact. IMPRESSION: No acute intrathoracic process. Xr Chest Portable    Result Date: 9/1/2018  EXAMINATION: PORTABLE CHEST X-RAY FROM 9/1/2018 AT 10:45 HOURS CLINICAL HISTORY: CHECK RIGHT CENTRAL VENOUS LINE PLACEMENT COMPARISONS: 8/31/2018 FINDINGS: There is borderline cardiomegaly. There is atherosclerotic tortuous aorta. The distal tip of the right central venous catheter is extending into the right side of the neck and presumably within the right internal jugular vein and the right CVC should be repositioned. VENOUS ACCESS FOR LONG-TERM IV ANTIBIOTIC THERAPY, PHYSICIAN REQUESTS PLACEMENT OF PICC LINE After discussing the procedure and possible complications with the patient, informed consent was obtained. The patient was placed on the Special Procedures table. The left upper extremity was sterilely prepared using 2% chlorhexidine. Central venous catheter was inserted using a maximal sterile barrier technique which includes cap, mask, sterile gown, sterile gloves, sterile full-body drape, hand hygiene and 2% chlorhexidine for cutaneous antisepsis. A sterile ultrasound technique with sterile gel and sterile probe covers was also utilized. A pre-procedure time out was performed in order to assure the correct patient and procedure. Local anesthetic was administered. Utilizing sterile gel and sterile probe covers, a peripheral vein was accessed with sonographic guidance. A sonographic spot image was obtained for documentation. A guidewire was advanced into the vein with fluoroscopic guidance and a sheath was placed over the guidewire. A 5-Indonesian triple-lumen PICC was advanced through the sheath, up the arm and into the central vasculature. It was positioned appropriately. The sheath was removed. The catheter was shown to aspirate and infuse properly. The flange of the catheter was affixed to the arm using a PICC securement device. A spot image of the chest showed the tip of the PICC line to lie in the superior vena cava. The patient tolerated the procedure well and without complications. Number of films: 1 Fluoroscopy time: 190 seconds CONCLUSION: SUCCESSFUL LUE PICC PLACEMENT WITHOUT IMMEDIATE COMPLICATIONS. Us Dup Upper Extremity Mapping Bilat Venous    Result Date: 9/6/2018  EXAMINATION: US DUP UPPER EXTREMITY MAPPING BILAT VENOUS CLINICAL HISTORY:  edema r/o DVT . Bilateral hand pain. Bilateral hand swelling. COMPARISONS: None available.  FINDINGS: Bilateral upper extremity venous duplex sonogram was

## 2018-09-15 NOTE — FLOWSHEET NOTE
PT. HAS BEEN MORE  AWAKE AND ALERT TODAY THAN YESTERDAY. ALSO SPEAKING A FEW WORDS; ATTEMPTING TO CARRY ON A CONVERSATION WHEN SPOKEN TO.  CONTINUES TO BE ALERT TO SOME THE R AND B MUSIC PLAYING IN ROOM; OCC. SINGING A WORD OR 2 TO THE SONG. LEVOPHED REMAINS OFF; VITAL SINGS HAVE BEEN STABLE. LEFT UPPER ARM PICC INSERTED, IN SPECIALS, WITHOUT DIFFICULTY. RIGHT FEMORAL CVC AND A LINE D/C'D. B/P CUFF APPLIED TO RIGHT ARM. NOTED B/P READING S EXTREMELY ELEVATED. TUBE FEEDING BEING INCREASED ORDERED:  4532:  202/157, 1625:  246/226,  1635:  167/105, 1700:  126/102. MONITOR OF B/P CONTINUES.   1 TOUCH RESULTS: 156, 69. AT 71, PT. ALERT AND TALKING. 1715:  TF RATE INCREASED TO 45 CC/HR. 1730:  B/P 96/55.  1800:  177/155, 1805: 181/148, 1825: 50/37, 1830: 71/50, 1835: 78/46  CALL PLACED TO DR. Bonny Harvey. BENAVIDES EMPTIED  CC.  1 MEDIUM BOWEL MOVEMENT, TODAY. MEPILEX DRESSING REMOVED DUE TO INCREASED IRRITATION SECONDARY TO REMOVING  SOILED DRESSINGS. IRRITATED SKIN COVERED WITH PROTECTIVE BARRIER CREAM.  1845:  DR. Bonny Harvey RETURNED CALL. INFORMED OF PT.'S B/P READINGS. ORDERS RECEIVED. TRANSFER ORDER CANCELLED. PT. TO RECEIVE 250 CC BOLUS OF N/S AND IF NO RESPONSE LEVOPHED TO RESUME.

## 2018-09-15 NOTE — PROGRESS NOTES
0100 Pt repositioned, no change in assessment, BP improved with levophed, titrating to keep MAP greater than 65.

## 2018-09-15 NOTE — PROGRESS NOTES
brain is obtained without IV contrast agent. FINDINGS: An unenhanced CT scan of the brain demonstrates no evidence of a skull fracture. There is cerebral atrophy and age related findings in the brain. Mild patchy white matter hypoattenuation is likely a sequela of small vessel disease. There is no acute CVA. There is no acute intra-axial or extra-axial findings in the brain. There is no intracranial mass, hemorrhage, \"mass effect\" or midline shift. The remainder of the CT scan of the brain appears unremarkable. No significant change since the 5/14/2018 CT brain. 1. CEREBRAL ATROPHY AND AGE RELATED FINDINGS IN THE BRAIN. 2. NO ACUTE INTRA-AXIAL OR EXTRA-AXIAL FINDINGS IN THE BRAIN. 3. NO SIGNIFICANT CHANGE SINCE 5/14/2018 CT BRAIN. All CT scans at this facility use dose modulation, iterative reconstruction, and/or weight based dosing when appropriate to reduce radiation dose to as low as reasonably achievable. Xr Chest Portable    Result Date: 9/1/2018  Portable chest radiograph History: Intubation. Technique: AP portable view of the chest obtained. Comparison: Chest radiograph from September 1, 2018, 1045 hours Findings: Interval placement of an endotracheal tube, its tip terminating at the level of the clavicles. Interval placement of an enteric tube, which abruptly curves just proximal to the diaphragm likely secondary to the patient's hiatal hernia. Atherosclerotic ossification of the thoracic aorta. Continued mediastinal silhouette is within normal limits. Patchy bilateral opacities within each lung base. A pneumothorax or pleural effusion. Interval placement of endotracheal tube, terminating at the level of the clavicles. Enteric tube is present and abruptly turns proximal diaphragm likely due to the patient's hiatal hernia. Subsegmental atelectasis versus small infiltrate in both lung bases.     Xr Chest Portable    Result Date: 9/1/2018  Portable chest radiograph History: Shortness of breath Technique: AP portable view of the chest obtained. Comparison: Chest x-ray from August 30, 2018 Findings: Atherosclerotic calcification of the thoracic aorta. Surgical clips project over the cardiac silhouette. The cardiomediastinal silhouette is within normal limits. No pneumothorax, pleural effusion, or focal consolidation. Osseous structures of the thorax  are intact. IMPRESSION: No acute intrathoracic process. Xr Chest Portable    Result Date: 9/1/2018  EXAMINATION: PORTABLE CHEST X-RAY FROM 9/1/2018 AT 10:45 HOURS CLINICAL HISTORY: CHECK RIGHT CENTRAL VENOUS LINE PLACEMENT COMPARISONS: 8/31/2018 FINDINGS: There is borderline cardiomegaly. There is atherosclerotic tortuous aorta. The distal tip of the right central venous catheter is extending into the right side of the neck and presumably within the right internal jugular vein and the right CVC should be repositioned. There is no pneumothorax. There is subsegmental atelectasis or developing small streaky infiltrate in the left lung base. Rounded lucency superimposed on the left cardiac silhouette may very well represent a hiatal hernia. There is degenerative bone spurring throughout the spine. 1. DISTAL TIP OF RIGHT CENTRAL VENOUS LINE IS IN RIGHT SIDE OF THE NECK AND RIGHT CVC SHOULD BE REPOSITIONED. 2. NO EVIDENCE OF A PNEUMOTHORAX POST RIGHT CVC LINE PLACEMENT. 3. SUSPECT SUBSEGMENTAL ATELECTASIS OR PERHAPS DEVELOPING SMALL INFILTRATE IN LEFT LUNG BASE. 4. BORDERLINE CARDIOMEGALY AND ATHEROSCLEROTIC TORTUOUS AORTA. Recent Labs      09/07/18   0542  09/09/18   0600   WBC  7.4  5.1   HGB  8.9*  9.0*   PLT  89*  163     No results for input(s): BILITOT, ALKPHOS, AST, ALT in the last 72 hours.   Lab Results   Component Value Date    PROTIME 11.1 08/31/2018    INR 1.1 08/31/2018     Lab Results   Component Value Date    VALPROATE 9.7 08/17/2016       ASSESSMENT AND PLAN    Encephalopathy,still intermittent issues/ more awake today  Family did not want LP   metabolic/infective from urosepsis, had alfaro. Pneumonia with resolved respiratory failure that had required intubation. Had HIT, platelet count recovering. Acute anemia improving. Renal function improving   MRI, no new stroke but shows significant bilateral hippocampal atrophy with moderate global atorphy. Mixed Alzheimer's and vascular dementia  Continue memantine 5 mg  Edentulous and very dysarthric  More lethargic  To continue the supportive measures  Needs outpatient neurology follow up for Alzheimer's disease. Watch for CNS infection  PEG done  Will add provigil to see if helps  Will see how she does    Og Hernandez MD, [place], American Board of Psychiatry & Neurology  Board Certified in Vascular Neurology  Board Certified in Neuromuscular Medicine  Certified in . [place] [address]

## 2018-09-16 LAB
ALBUMIN SERPL-MCNC: 2.1 G/DL (ref 3.9–4.9)
ANION GAP SERPL CALCULATED.3IONS-SCNC: 9 MEQ/L (ref 7–13)
BUN BLDV-MCNC: 19 MG/DL (ref 8–23)
CALCIUM SERPL-MCNC: 8.7 MG/DL (ref 8.6–10.2)
CHLORIDE BLD-SCNC: 102 MEQ/L (ref 98–107)
CO2: 30 MEQ/L (ref 22–29)
CREAT SERPL-MCNC: 0.5 MG/DL (ref 0.5–0.9)
GFR AFRICAN AMERICAN: >60
GFR NON-AFRICAN AMERICAN: >60
GLUCOSE BLD-MCNC: 139 MG/DL (ref 60–115)
GLUCOSE BLD-MCNC: 179 MG/DL (ref 74–109)
GLUCOSE BLD-MCNC: 185 MG/DL (ref 60–115)
GLUCOSE BLD-MCNC: 244 MG/DL (ref 60–115)
HCT VFR BLD CALC: 26.2 % (ref 37–47)
HEMOGLOBIN: 8.5 G/DL (ref 12–16)
MCH RBC QN AUTO: 25.6 PG (ref 27–31.3)
MCHC RBC AUTO-ENTMCNC: 32.6 % (ref 33–37)
MCV RBC AUTO: 78.5 FL (ref 82–100)
PDW BLD-RTO: 20.5 % (ref 11.5–14.5)
PERFORMED ON: ABNORMAL
PHOSPHORUS: 2.4 MG/DL (ref 2.5–4.5)
PLATELET # BLD: 331 K/UL (ref 130–400)
POTASSIUM SERPL-SCNC: 3.9 MEQ/L (ref 3.5–5.1)
RBC # BLD: 3.33 M/UL (ref 4.2–5.4)
SODIUM BLD-SCNC: 141 MEQ/L (ref 132–144)
WBC # BLD: 5.3 K/UL (ref 4.8–10.8)

## 2018-09-16 PROCEDURE — 80069 RENAL FUNCTION PANEL: CPT

## 2018-09-16 PROCEDURE — 2700000000 HC OXYGEN THERAPY PER DAY

## 2018-09-16 PROCEDURE — 99232 SBSQ HOSP IP/OBS MODERATE 35: CPT | Performed by: INTERNAL MEDICINE

## 2018-09-16 PROCEDURE — 6360000002 HC RX W HCPCS: Performed by: INTERNAL MEDICINE

## 2018-09-16 PROCEDURE — 2580000003 HC RX 258: Performed by: PSYCHIATRY & NEUROLOGY

## 2018-09-16 PROCEDURE — 85027 COMPLETE CBC AUTOMATED: CPT

## 2018-09-16 PROCEDURE — 2580000003 HC RX 258: Performed by: INTERNAL MEDICINE

## 2018-09-16 PROCEDURE — 6370000000 HC RX 637 (ALT 250 FOR IP): Performed by: NURSE PRACTITIONER

## 2018-09-16 PROCEDURE — 6370000000 HC RX 637 (ALT 250 FOR IP): Performed by: INTERNAL MEDICINE

## 2018-09-16 PROCEDURE — 1210000000 HC MED SURG R&B

## 2018-09-16 PROCEDURE — 94664 DEMO&/EVAL PT USE INHALER: CPT

## 2018-09-16 PROCEDURE — 6370000000 HC RX 637 (ALT 250 FOR IP): Performed by: PSYCHIATRY & NEUROLOGY

## 2018-09-16 PROCEDURE — 2580000003 HC RX 258: Performed by: NURSE PRACTITIONER

## 2018-09-16 RX ADMIN — FOLIC ACID 1 MG: 1 TABLET ORAL at 08:24

## 2018-09-16 RX ADMIN — MEROPENEM 1 G: 1 INJECTION, POWDER, FOR SOLUTION INTRAVENOUS at 01:06

## 2018-09-16 RX ADMIN — Medication 10 ML: at 08:25

## 2018-09-16 RX ADMIN — MODAFINIL 100 MG: 100 TABLET ORAL at 13:34

## 2018-09-16 RX ADMIN — MODAFINIL 100 MG: 100 TABLET ORAL at 08:24

## 2018-09-16 RX ADMIN — DAPTOMYCIN 220 MG: 500 INJECTION, POWDER, LYOPHILIZED, FOR SOLUTION INTRAVENOUS at 18:25

## 2018-09-16 RX ADMIN — INSULIN LISPRO 1 UNITS: 100 INJECTION, SOLUTION INTRAVENOUS; SUBCUTANEOUS at 05:33

## 2018-09-16 RX ADMIN — MEROPENEM 1 G: 1 INJECTION, POWDER, FOR SOLUTION INTRAVENOUS at 17:32

## 2018-09-16 RX ADMIN — Medication 500 MG: at 08:24

## 2018-09-16 RX ADMIN — Medication 10 ML: at 08:24

## 2018-09-16 RX ADMIN — MEMANTINE HYDROCHLORIDE 5 MG: 5 TABLET ORAL at 08:24

## 2018-09-16 RX ADMIN — DOCUSATE SODIUM 100 MG: 50 LIQUID ORAL at 08:23

## 2018-09-16 RX ADMIN — PANTOPRAZOLE SODIUM 40 MG: 40 GRANULE, DELAYED RELEASE ORAL at 05:33

## 2018-09-16 RX ADMIN — INSULIN LISPRO 2 UNITS: 100 INJECTION, SOLUTION INTRAVENOUS; SUBCUTANEOUS at 11:35

## 2018-09-16 RX ADMIN — HYDROCORTISONE SODIUM SUCCINATE 50 MG: 100 INJECTION, POWDER, FOR SOLUTION INTRAMUSCULAR; INTRAVENOUS at 12:15

## 2018-09-16 RX ADMIN — MINERAL SUPPLEMENT IRON 300 MG / 5 ML STRENGTH LIQUID 100 PER BOX UNFLAVORED 300 MG: at 08:24

## 2018-09-16 RX ADMIN — FLUCONAZOLE 100 MG: 100 TABLET ORAL at 08:24

## 2018-09-16 RX ADMIN — Medication 10 ML: at 21:02

## 2018-09-16 RX ADMIN — HYDROCORTISONE SODIUM SUCCINATE 50 MG: 100 INJECTION, POWDER, FOR SOLUTION INTRAMUSCULAR; INTRAVENOUS at 04:50

## 2018-09-16 RX ADMIN — HYDROCORTISONE SODIUM SUCCINATE 50 MG: 100 INJECTION, POWDER, FOR SOLUTION INTRAMUSCULAR; INTRAVENOUS at 21:01

## 2018-09-16 RX ADMIN — ENOXAPARIN SODIUM 40 MG: 40 INJECTION SUBCUTANEOUS at 21:01

## 2018-09-16 RX ADMIN — MEROPENEM 1 G: 1 INJECTION, POWDER, FOR SOLUTION INTRAVENOUS at 08:23

## 2018-09-16 RX ADMIN — CIPROFLOXACIN 400 MG: 2 INJECTION, SOLUTION INTRAVENOUS at 10:18

## 2018-09-16 ASSESSMENT — PAIN SCALES - GENERAL
PAINLEVEL_OUTOF10: 0

## 2018-09-16 NOTE — PROGRESS NOTES
Patient arrived to [facility] camera in use. Assessment completed. Waiting for tube feeding to be sent and will restart feeding. Will continue to monitor. Bed in low locked position and call light within reach.

## 2018-09-16 NOTE — PROGRESS NOTES
cerebral atrophy and age related findings in the brain. Mild patchy white matter hypoattenuation is likely a sequela of small vessel disease. There is no acute CVA. There is no acute intra-axial or extra-axial findings in the brain. There is no intracranial mass, hemorrhage, \"mass effect\" or midline shift. The remainder of the CT scan of the brain appears unremarkable. No significant change since the 5/14/2018 CT brain. 1. CEREBRAL ATROPHY AND AGE RELATED FINDINGS IN THE BRAIN. 2. NO ACUTE INTRA-AXIAL OR EXTRA-AXIAL FINDINGS IN THE BRAIN. 3. NO SIGNIFICANT CHANGE SINCE 5/14/2018 CT BRAIN. All CT scans at this facility use dose modulation, iterative reconstruction, and/or weight based dosing when appropriate to reduce radiation dose to as low as reasonably achievable. Xr Chest Portable    Result Date: 9/1/2018  Portable chest radiograph History: Intubation. Technique: AP portable view of the chest obtained. Comparison: Chest radiograph from September 1, 2018, 1045 hours Findings: Interval placement of an endotracheal tube, its tip terminating at the level of the clavicles. Interval placement of an enteric tube, which abruptly curves just proximal to the diaphragm likely secondary to the patient's hiatal hernia. Atherosclerotic ossification of the thoracic aorta. Continued mediastinal silhouette is within normal limits. Patchy bilateral opacities within each lung base. A pneumothorax or pleural effusion. Interval placement of endotracheal tube, terminating at the level of the clavicles. Enteric tube is present and abruptly turns proximal diaphragm likely due to the patient's hiatal hernia. Subsegmental atelectasis versus small infiltrate in both lung bases. Xr Chest Portable    Result Date: 9/1/2018  Portable chest radiograph History: Shortness of breath Technique: AP portable view of the chest obtained.  Comparison: Chest x-ray from August 30, 2018 Findings: Atherosclerotic calcification of the thoracic aorta. Surgical clips project over the cardiac silhouette. The cardiomediastinal silhouette is within normal limits. No pneumothorax, pleural effusion, or focal consolidation. Osseous structures of the thorax  are intact. IMPRESSION: No acute intrathoracic process. Xr Chest Portable    Result Date: 9/1/2018  EXAMINATION: PORTABLE CHEST X-RAY FROM 9/1/2018 AT 10:45 HOURS CLINICAL HISTORY: CHECK RIGHT CENTRAL VENOUS LINE PLACEMENT COMPARISONS: 8/31/2018 FINDINGS: There is borderline cardiomegaly. There is atherosclerotic tortuous aorta. The distal tip of the right central venous catheter is extending into the right side of the neck and presumably within the right internal jugular vein and the right CVC should be repositioned. There is no pneumothorax. There is subsegmental atelectasis or developing small streaky infiltrate in the left lung base. Rounded lucency superimposed on the left cardiac silhouette may very well represent a hiatal hernia. There is degenerative bone spurring throughout the spine. 1. DISTAL TIP OF RIGHT CENTRAL VENOUS LINE IS IN RIGHT SIDE OF THE NECK AND RIGHT CVC SHOULD BE REPOSITIONED. 2. NO EVIDENCE OF A PNEUMOTHORAX POST RIGHT CVC LINE PLACEMENT. 3. SUSPECT SUBSEGMENTAL ATELECTASIS OR PERHAPS DEVELOPING SMALL INFILTRATE IN LEFT LUNG BASE. 4. BORDERLINE CARDIOMEGALY AND ATHEROSCLEROTIC TORTUOUS AORTA. Recent Labs      09/07/18   0542  09/09/18   0600   WBC  7.4  5.1   HGB  8.9*  9.0*   PLT  89*  163     No results for input(s): BILITOT, ALKPHOS, AST, ALT in the last 72 hours. Lab Results   Component Value Date    PROTIME 11.1 08/31/2018    INR 1.1 08/31/2018     Lab Results   Component Value Date    VALPROATE 9.7 08/17/2016       ASSESSMENT AND PLAN    Encephalopathy,still intermittent issues/ more awake today  Family did not want LP   metabolic/infective from urosepsis, had alfaro. Pneumonia with resolved respiratory failure that had required intubation.    Had HIT,

## 2018-09-16 NOTE — PROGRESS NOTES
MD Jose   100 mg at 09/16/18 0824    sodium chloride flush 0.9 % injection 10 mL  10 mL Intravenous 2 times per day Peng Lopez MD   10 mL at 09/16/18 0825    sodium chloride flush 0.9 % injection 10 mL  10 mL Intravenous PRN Peng Lopez MD        daptomycin (CUBICIN) 220 mg in sodium chloride 0.9 % 50 mL IVPB  4 mg/kg (Ideal) Intravenous Q24H Stan Ratliff MD   Stopped at 09/15/18 1930    meropenem (MERREM) 1 g in sodium chloride 0.9 % 100 mL IVPB (mini-bag)  1 g Intravenous Q8H Stan Ratliff MD   Stopped at 09/16/18 0900    modafinil (PROVIGIL) tablet 100 mg  100 mg Oral BID Peng Lopez MD   100 mg at 09/16/18 1334    acetaminophen (TYLENOL) suppository 650 mg  650 mg Rectal Q6H PRN Catarina Larsen MD   650 mg at 09/12/18 2009    ciprofloxacin (CIPRO) IVPB 400 mg  400 mg Intravenous Q12H Catarina Larsen MD   Stopped at 09/16/18 1120    ipratropium-albuterol (DUONEB) nebulizer solution 1 ampule  1 ampule Inhalation Q4H PRN Ivan Vaughn MD   1 ampule at 09/11/18 2351    enoxaparin (LOVENOX) injection 40 mg  40 mg Subcutaneous Daily Samy Hernandez MD   40 mg at 09/15/18 2237    pantoprazole sodium (PROTONIX) packet 40 mg  40 mg Oral QAM AC Chemo Fabian MD   40 mg at 09/16/18 0533    ferrous sulfate 300 (60 Fe) MG/5ML syrup 300 mg  300 mg Oral Daily with breakfast Kellen Leonard MD   300 mg at 09/16/18 0824    senna (SENOKOT) 8.8 MG/5ML syrup 8.8 mg  5 mL Oral BID PRN Samy Hernandez MD        guaiFENesin (ROBITUSSIN) 100 MG/5ML liquid 200 mg  200 mg Oral Q4H PRN Ivan Vaughn MD   200 mg at 09/04/18 0829    insulin lispro (HUMALOG) injection vial 0-6 Units  0-6 Units Subcutaneous 4 times per day Catarina Larsen MD   2 Units at 09/16/18 1135    acetaminophen (TYLENOL) tablet 650 mg  650 mg Oral Q4H PRN ELVA Akers CNP   650 mg at 09/10/18 2205    calcium elemental (OSCAL) tablet 500 mg  500 mg Oral Daily ELVA Akers CNP   500 mg at 90/49/80 7767    folic acid (FOLVITE) tablet 1 mg  1 mg Oral Daily Hollice Villareal, APRN - CNP   1 mg at 09/16/18 0824    memantine (NAMENDA) tablet 5 mg  5 mg Oral Daily Hollice Villareal, APRN - CNP   5 mg at 09/16/18 0824    sodium chloride flush 0.9 % injection 10 mL  10 mL Intravenous 2 times per day Hollice Villareal, APRN - CNP   10 mL at 09/16/18 0825    sodium chloride flush 0.9 % injection 10 mL  10 mL Intravenous PRN Hollice Villareal, APRN - CNP   10 mL at 09/09/18 0044    magnesium hydroxide (MILK OF MAGNESIA) 400 MG/5ML suspension 30 mL  30 mL Oral Daily PRN Hollice Villareal, APRN - CNP        ondansetron (ZOFRAN) injection 4 mg  4 mg Intravenous Q6H PRN Hollice Villareal, APRN - CNP   4 mg at 09/01/18 0928    glucose (GLUTOSE) 40 % oral gel 15 g  15 g Oral PRN Hollice Villareal, APRN - CNP        dextrose 50 % solution 12.5 g  12.5 g Intravenous PRN Hollice Villareal, APRN - CNP        glucagon (rDNA) injection 1 mg  1 mg Intramuscular PRN Hollice Villareal, APRN - CNP        dextrose 5 % solution  100 mL/hr Intravenous PRN Hollice Villareal, APRN - CNP         Assessment and Plan:          Acute Hospital issues:    # Severe sepsis with septic shock.  Questionable catheter related versus aspiration pneumonia.  Chest x-ray was reviewed by and  seems to be better than previous chest x-ray on September 4.    - resume abx per ID. Appreciate critical care help   - off levophed. Pressure is better     # Candida UTI  - resume diflucan per ID     # GN Sepsis. Resolved. cirtobactor and Ecoli. Completed 10 days of cefepime  - as above     # Met encephalopathy. Underlying vascular dementia  - neuro on board. At baseline     # Dysphagia failed swallow test for PEG tube   - s/p PEG     # Rt heel decubitus ulcer. Podiatry is following  - appreciate podiatry help     # Anemia. No active bleed. hgb stable  - monitor counts     # MALA.    - resolved     # Mod-severe malnutirtion  # Hypernatremia resolved    DVT/PPx- ordered if appropriate        DISCHARGE PLANNING  Transfer

## 2018-09-16 NOTE — PROGRESS NOTES
recovering. Acute anemia improving. Renal function improving   MRI, no new stroke but shows significant bilateral hippocampal atrophy with moderate global atorphy. Mixed Alzheimer's and vascular dementia  Continue memantine 5 mg  Edentulous and very dysarthric  More lethargic  To continue the supportive measures  Needs outpatient neurology follow up for Alzheimer's disease.  Watch for CNS infection  PEG done  Will add provigil to see if helps  Will see how she does  No seizure activity

## 2018-09-16 NOTE — PROGRESS NOTES
Called nutrition to have tube feed sent. ICU called prior to transfer, still have not received.  Will restart feeding once received

## 2018-09-16 NOTE — PROGRESS NOTES
Gram-negative sepsis Citrobacter and E. coli          Patient was treated for gram-negative sepsis with Citrobacter and E. coli repeat urine had shown Morganella and enterococcus or not treated since disease or small number following the initial infection      No Fevers  No  Pressors  Opens eyes  Does not talk    Physical Exam:    BP (!) 117/52   Pulse 68   Temp 97.3 °F (36.3 °C) (Oral)   Resp 20   Ht 5' 4\" (1.626 m)   Wt 182 lb 15.7 oz (83 kg)   SpO2 100%   BMI 31.41 kg/m²   Cardiovascular: Normal heart sounds and intact distal pulses. No murmur heard. Pulmonary/Chest: She has no wheezes. She has no rales. She exhibits no tenderness. Abdominal: Soft. She exhibits no distension and no mass. There is no hepatosplenomegaly, splenomegaly or hepatomegaly. There is no tenderness. There is no rebound. Extremities show edema no rashes normal    Specimen: Blood Updated: 09/15/18 0555       Sodium 140 mEq/L       Potassium 3.5 mEq/L       Chloride 102 mEq/L       CO2 29 mEq/L       Anion Gap 9 mEq/L       Glucose 192 (H) mg/dL       BUN 14 mg/dL       CREATININE 0.47 (L) mg/dL       GFR Non- >60.0      GFR African American >60.0      Calcium 8.8 mg/dL       Phosphorus 2.4 (L) mg/dL       Alb 2.2 (L) g/dL      CBC [[id number]] (Abnormal) Collected: 09/15/18 0522     Specimen: Blood Updated: 09/15/18 0527      WBC 6.3 K/uL       RBC 3.28 (L) M/uL       Hemoglobin 8.4 (L) g/dL       Hematocrit 25.8 (L) %       MCV 78.7 (L) fL       MCH 25.8 (L) pg       MCHC 32.7 (L) %       RDW 20.5 (H) %       Platelets 597       Blood cultures remain no growth    PLAN:    Gram-negative sepsis Citrobacter and E.  Coli    sepsis   organisms patient grew before need to be covered Morganella and enterococcus    Cipro  daptomycin and add meropenem Fluconazole

## 2018-09-17 LAB
ANION GAP SERPL CALCULATED.3IONS-SCNC: 11 MEQ/L (ref 7–13)
BUN BLDV-MCNC: 21 MG/DL (ref 8–23)
CALCIUM SERPL-MCNC: 8.7 MG/DL (ref 8.6–10.2)
CHLORIDE BLD-SCNC: 101 MEQ/L (ref 98–107)
CO2: 28 MEQ/L (ref 22–29)
CORTISOL TOTAL: 46 UG/DL
CREAT SERPL-MCNC: 0.47 MG/DL (ref 0.5–0.9)
GFR AFRICAN AMERICAN: >60
GFR NON-AFRICAN AMERICAN: >60
GLUCOSE BLD-MCNC: 151 MG/DL (ref 60–115)
GLUCOSE BLD-MCNC: 175 MG/DL (ref 60–115)
GLUCOSE BLD-MCNC: 231 MG/DL (ref 60–115)
GLUCOSE BLD-MCNC: 252 MG/DL (ref 60–115)
GLUCOSE BLD-MCNC: 277 MG/DL (ref 74–109)
HCT VFR BLD CALC: 27.1 % (ref 37–47)
HEMOGLOBIN: 8.9 G/DL (ref 12–16)
MCH RBC QN AUTO: 25.9 PG (ref 27–31.3)
MCHC RBC AUTO-ENTMCNC: 32.7 % (ref 33–37)
MCV RBC AUTO: 79.2 FL (ref 82–100)
PDW BLD-RTO: 21.3 % (ref 11.5–14.5)
PERFORMED ON: ABNORMAL
PLATELET # BLD: 354 K/UL (ref 130–400)
POTASSIUM SERPL-SCNC: 4.4 MEQ/L (ref 3.5–5.1)
RBC # BLD: 3.42 M/UL (ref 4.2–5.4)
SODIUM BLD-SCNC: 140 MEQ/L (ref 132–144)
WBC # BLD: 5.4 K/UL (ref 4.8–10.8)

## 2018-09-17 PROCEDURE — 82533 TOTAL CORTISOL: CPT

## 2018-09-17 PROCEDURE — 2580000003 HC RX 258: Performed by: NURSE PRACTITIONER

## 2018-09-17 PROCEDURE — 6370000000 HC RX 637 (ALT 250 FOR IP): Performed by: INTERNAL MEDICINE

## 2018-09-17 PROCEDURE — 6370000000 HC RX 637 (ALT 250 FOR IP): Performed by: NURSE PRACTITIONER

## 2018-09-17 PROCEDURE — 99232 SBSQ HOSP IP/OBS MODERATE 35: CPT | Performed by: INTERNAL MEDICINE

## 2018-09-17 PROCEDURE — 2580000003 HC RX 258: Performed by: INTERNAL MEDICINE

## 2018-09-17 PROCEDURE — 6360000002 HC RX W HCPCS: Performed by: INTERNAL MEDICINE

## 2018-09-17 PROCEDURE — 1210000000 HC MED SURG R&B

## 2018-09-17 PROCEDURE — 2580000003 HC RX 258: Performed by: PSYCHIATRY & NEUROLOGY

## 2018-09-17 PROCEDURE — 6370000000 HC RX 637 (ALT 250 FOR IP): Performed by: PSYCHIATRY & NEUROLOGY

## 2018-09-17 PROCEDURE — 85027 COMPLETE CBC AUTOMATED: CPT

## 2018-09-17 PROCEDURE — 51702 INSERT TEMP BLADDER CATH: CPT

## 2018-09-17 PROCEDURE — 80048 BASIC METABOLIC PNL TOTAL CA: CPT

## 2018-09-17 RX ORDER — MODAFINIL 100 MG/1
100 TABLET ORAL 2 TIMES DAILY
Qty: 60 TABLET | Refills: 0 | Status: SHIPPED | OUTPATIENT
Start: 2018-09-17 | End: 2018-10-17

## 2018-09-17 RX ADMIN — MEROPENEM 1 G: 1 INJECTION, POWDER, FOR SOLUTION INTRAVENOUS at 17:18

## 2018-09-17 RX ADMIN — MODAFINIL 100 MG: 100 TABLET ORAL at 16:13

## 2018-09-17 RX ADMIN — FLUCONAZOLE 100 MG: 100 TABLET ORAL at 10:12

## 2018-09-17 RX ADMIN — Medication 10 ML: at 15:03

## 2018-09-17 RX ADMIN — FOLIC ACID 1 MG: 1 TABLET ORAL at 10:12

## 2018-09-17 RX ADMIN — Medication 500 MG: at 10:20

## 2018-09-17 RX ADMIN — INSULIN LISPRO 2 UNITS: 100 INJECTION, SOLUTION INTRAVENOUS; SUBCUTANEOUS at 05:55

## 2018-09-17 RX ADMIN — INSULIN LISPRO 1 UNITS: 100 INJECTION, SOLUTION INTRAVENOUS; SUBCUTANEOUS at 18:29

## 2018-09-17 RX ADMIN — INSULIN LISPRO 3 UNITS: 100 INJECTION, SOLUTION INTRAVENOUS; SUBCUTANEOUS at 12:15

## 2018-09-17 RX ADMIN — Medication 10 ML: at 10:20

## 2018-09-17 RX ADMIN — MODAFINIL 100 MG: 100 TABLET ORAL at 10:12

## 2018-09-17 RX ADMIN — Medication 10 ML: at 22:46

## 2018-09-17 RX ADMIN — Medication 10 ML: at 22:45

## 2018-09-17 RX ADMIN — INSULIN LISPRO 1 UNITS: 100 INJECTION, SOLUTION INTRAVENOUS; SUBCUTANEOUS at 00:38

## 2018-09-17 RX ADMIN — DOCUSATE SODIUM 100 MG: 50 LIQUID ORAL at 10:13

## 2018-09-17 RX ADMIN — Medication 10 ML: at 00:07

## 2018-09-17 RX ADMIN — MEROPENEM 1 G: 1 INJECTION, POWDER, FOR SOLUTION INTRAVENOUS at 00:53

## 2018-09-17 RX ADMIN — MEROPENEM 1 G: 1 INJECTION, POWDER, FOR SOLUTION INTRAVENOUS at 10:27

## 2018-09-17 RX ADMIN — HYDROCORTISONE SODIUM SUCCINATE 50 MG: 100 INJECTION, POWDER, FOR SOLUTION INTRAMUSCULAR; INTRAVENOUS at 04:05

## 2018-09-17 RX ADMIN — MINERAL SUPPLEMENT IRON 300 MG / 5 ML STRENGTH LIQUID 100 PER BOX UNFLAVORED 300 MG: at 10:13

## 2018-09-17 RX ADMIN — PANTOPRAZOLE SODIUM 40 MG: 40 GRANULE, DELAYED RELEASE ORAL at 05:54

## 2018-09-17 RX ADMIN — Medication 10 ML: at 04:05

## 2018-09-17 RX ADMIN — ENOXAPARIN SODIUM 40 MG: 40 INJECTION SUBCUTANEOUS at 22:44

## 2018-09-17 RX ADMIN — MEMANTINE HYDROCHLORIDE 5 MG: 5 TABLET ORAL at 10:12

## 2018-09-17 ASSESSMENT — PAIN SCALES - PAIN ASSESSMENT IN ADVANCED DEMENTIA (PAINAD)
FACIALEXPRESSION: 0
BODYLANGUAGE: 0
NEGVOCALIZATION: 0
BODYLANGUAGE: 0
BREATHING: 0
CONSOLABILITY: 0
BREATHING: 0
FACIALEXPRESSION: 0
CONSOLABILITY: 0
TOTALSCORE: 0
NEGVOCALIZATION: 0
TOTALSCORE: 0

## 2018-09-17 ASSESSMENT — PAIN SCALES - GENERAL
PAINLEVEL_OUTOF10: 0

## 2018-09-17 NOTE — PROGRESS NOTES
Assessment documented. A + O x 1(self). Peg tube placement (+), no residuals. Infusing Vital @ 55 ml's hour, tolerating well. Respirations even and unlabored, no distress noted, turned and repositioned Q 2 hours. Needs anticipated by staff.

## 2018-09-17 NOTE — PROGRESS NOTES
Gram-negative sepsis Citrobacter and E. coli      Patient was treated for gram-negative sepsis with Citrobacter and E. coli repeat urine had shown Morganella and enterococcus or not treated since disease or small number following the initial infection    Awake opens eyes does not talk no obvious distress      Physical Exam:    /82   Pulse 64   Temp 98.4 °F (36.9 °C) (Axillary)   Resp 18   Ht 5' 4\" (1.626 m)   Wt 241 lb 2.9 oz (109.4 kg)   SpO2 100%   BMI 41.40 kg/m²   Cardiovascular: Normal heart sounds and intact distal pulses. No murmur heard. Pulmonary/Chest: She has no wheezes. She has no rales. She exhibits no tenderness. Abdominal: Soft. She exhibits no distension and no mass. There is no hepatosplenomegaly, splenomegaly or hepatomegaly. There is no tenderness. There is no rebound.    Extremities show edema no rashes normal      Blood cultures remain no growth    PLAN:    Gram-negative sepsis         daptomycin  meropenem Fluconazole  day for reexpanded therapy following transferred to intensive care unit  Discontinue Cipro yesterday discontinue daptomycin today

## 2018-09-17 NOTE — PROGRESS NOTES
Physician Progress Note    2018   10:59 AM    Name:  Destin Salazar  MRN:    [id number]     [address] [facility] Day: 12     Admit Date: 2018 11:00 PM  PCP: Donya Hicks MD    Code Status:  Full Code    Subjective:      no new events. Feels well. Asymptomatic. Physical Examination:      Vitals:  /69   Pulse 59   Temp 98.4 °F (36.9 °C) (Axillary)   Resp 18   Ht 5' 4\" (1.626 m)   Wt 241 lb 2.9 oz (109.4 kg)   SpO2 100%   BMI 41.40 kg/m²   Temp (24hrs), Av.8 °F (36.6 °C), Min:97.3 °F (36.3 °C), Max:98.4 °F (36.9 °C)      General appearance: alert, cooperative and no distress  Mental Status: normal affect. A&Ox1  Lungs: clear to auscultation bilaterally, normal effort  Heart: regular rate and rhythm, no murmur  Abdomen: soft, nontender, nondistended, bowel sounds present, no masses  Extremities: no edema, redness, tenderness in the calves  Skin: stage 2 decub     Data:     Labs:  Recent Labs      18   0550  18   0600   WBC  5.3  5.4   HGB  8.5*  8.9*   PLT  331  354     Recent Labs      18   0550  18   0600   NA  141  140   K  3.9  4.4   CL  102  101   CO2  30*  28   BUN  19  21   CREATININE  0.50  0.47*   GLUCOSE  179*  277*     No results for input(s): AST, ALT, ALB, BILITOT, ALKPHOS in the last 72 hours.     Current Facility-Administered Medications   Medication Dose Route Frequency Provider Last Rate Last Dose    hydrocortisone sodium succinate PF (SOLU-CORTEF) injection 50 mg  50 mg Intravenous Q8H Tika Pickering MD   50 mg at 18 0405    docusate (COLACE) 50 MG/5ML liquid 100 mg  100 mg Oral Daily Yoel Adame MD   100 mg at 18 1013    sodium chloride flush 0.9 % injection 10 mL  10 mL Intravenous 2 times per day Yoel Adame MD   10 mL at 18 1020    sodium chloride flush 0.9 % injection 10 mL  10 mL Intravenous PRN Yoel Adame MD   10 mL at 18 0405    fluconazole (DIFLUCAN) tablet 100 mg  100 mg Oral Daily Genaro Feliciano MD   100 mg at 09/17/18 1012    sodium chloride flush 0.9 % injection 10 mL  10 mL Intravenous 2 times per day Mallory Sin MD   10 mL at 09/16/18 0825    sodium chloride flush 0.9 % injection 10 mL  10 mL Intravenous PRN Mallory Sin MD        daptomycin (CUBICIN) 220 mg in sodium chloride 0.9 % 50 mL IVPB  4 mg/kg (Ideal) Intravenous Q24H Swati Beyer MD   Stopped at 09/16/18 2100    meropenem (MERREM) 1 g in sodium chloride 0.9 % 100 mL IVPB (mini-bag)  1 g Intravenous Q8H Swati Beyer  mL/hr at 09/17/18 1027 1 g at 09/17/18 1027    modafinil (PROVIGIL) tablet 100 mg  100 mg Oral BID Mallory Sin MD   100 mg at 09/17/18 1012    acetaminophen (TYLENOL) suppository 650 mg  650 mg Rectal Q6H PRN Juliana García MD   650 mg at 09/12/18 2009    ipratropium-albuterol (DUONEB) nebulizer solution 1 ampule  1 ampule Inhalation Q4H PRN Kathia Britton MD   1 ampule at 09/11/18 2351    enoxaparin (LOVENOX) injection 40 mg  40 mg Subcutaneous Daily Samy Hernandez MD   40 mg at 09/16/18 2101    pantoprazole sodium (PROTONIX) packet 40 mg  40 mg Oral QAM AC Dipesh Barksdale MD   40 mg at 09/17/18 0554    ferrous sulfate 300 (60 Fe) MG/5ML syrup 300 mg  300 mg Oral Daily with breakfast Nicholas Bridges MD   300 mg at 09/17/18 1013    senna (SENOKOT) 8.8 MG/5ML syrup 8.8 mg  5 mL Oral BID PRN Samy Hernandez MD        guaiFENesin (ROBITUSSIN) 100 MG/5ML liquid 200 mg  200 mg Oral Q4H PRN Kathia Britton MD   200 mg at 09/04/18 0829    insulin lispro (HUMALOG) injection vial 0-6 Units  0-6 Units Subcutaneous 4 times per day Juliana García MD   2 Units at 09/17/18 0555    acetaminophen (TYLENOL) tablet 650 mg  650 mg Oral Q4H PRN Leeroy Bream, APRN - CNP   650 mg at 09/10/18 2205    calcium elemental (OSCAL) tablet 500 mg  500 mg Oral Daily Leeroy Bream, APRN - CNP   500 mg at 40/75/10 0551    folic acid (FOLVITE) tablet 1 mg  1 mg Oral Daily Leeroy Bream, APRN - CNP   1 mg at 09/17/18 1012    memantine (NAMENDA) tablet 9/17/2018 at 10:59 AM

## 2018-09-17 NOTE — PROGRESS NOTES
Patient only oriented to self. Changed tube feed which is still going at 55ml/hr at 1030, did full 100cc water flush, patient tolerated well. Patient had bowel movement, very loose and dark and patient fought us while washing her up. Has not been pulling at her tubes today but remains combative when trying to care for her. Vital signs stable. Continuing Q2 turns and replaced mepilex on sacrum as it was soiled. Will continue to monitor.

## 2018-09-17 NOTE — PROGRESS NOTES
Gloria Frost came in and switched patient to a diabetic tube feed, but was not specified whether glucerna or diabeta. Put out perfectserve message to see which and also put out page to dietician, will wait for feedback and switch it.

## 2018-09-17 NOTE — CARE COORDINATION
Spoke to daughter/POA of pt, Nicolasa, regarding DC plan. Discussed possible LTAC referral due to care needs. Nicolasa said her mom has been to [facility] before and she would be agreeable to that if indicated. Pt would then return to [facility]. Referral to LTAC made.

## 2018-09-17 NOTE — PROGRESS NOTES
Provides: 1584 Kcals, 99 Grams of Protein and ~ 1470 ml free water. · Additional Calories: EN  · Anthropometric Measures:  · Ht: 5' 4\" (162.6 cm)   · Current Body Wt: 182 lb (82.6 kg) (9/14/18)  · Admission Body Wt: 169 lb (76.7 kg)  · Usual Body Wt: 211 lb (95.7 kg) (5/9/18)  · % Weight Change: 15%,  ~ 4 months  · Ideal Body Wt: 120 lb (54.4 kg), % Ideal Body > 100%  · BMI Classification: BMI 30.0 - 34.9 Obese Class I (31.3 Based on 9/14/18 Weight)  · Comparative Standards (Estimated Nutrition Needs):  · Estimated Daily Total Kcal: 1495 to 1660  · Estimated Daily Protein (g): 83 t0 94  · Estimated Daily Fluid (ml/day): 1600 ml    Estimated Intake vs Estimated Needs: Intake Meets Needs    Nutrition Risk Level: Moderate    Nutrition Interventions:   Modify current Tube Feeding  Continued Inpatient Monitoring, Education Not Indicated    Nutrition Evaluation:   · Evaluation: Progressing toward goals   · Goals: EN To Provide Range of Estimated Nutrient Needs. BG < 160. · Monitoring: TF Tolerance, Gastric Residuals, Weight, Pertinent Labs, Fluid Balance    See Adult Nutrition Doc Flowsheet for more detail.      Electronically signed by Juan Rose RD, LD on 9/17/18 at 3:11 PM    Contact Number: [id number]

## 2018-09-17 NOTE — PLAN OF CARE
Problem: Nutrition  Goal: Optimal nutrition therapy  Nutrition Problem: Swallowing difficulty  Intervention: Food and/or Nutrient Delivery: Modify current Tube Feeding  Nutritional Goals: EN To Provide Range of Estimated Nutrient Needs. BG < 160.      Outcome: Ongoing

## 2018-09-18 VITALS
RESPIRATION RATE: 18 BRPM | WEIGHT: 241.18 LBS | BODY MASS INDEX: 41.18 KG/M2 | SYSTOLIC BLOOD PRESSURE: 148 MMHG | TEMPERATURE: 98.1 F | HEART RATE: 56 BPM | DIASTOLIC BLOOD PRESSURE: 52 MMHG | OXYGEN SATURATION: 100 % | HEIGHT: 64 IN

## 2018-09-18 LAB
ANION GAP SERPL CALCULATED.3IONS-SCNC: 8 MEQ/L (ref 7–13)
BLOOD CULTURE, ROUTINE: NORMAL
BUN BLDV-MCNC: 19 MG/DL (ref 8–23)
CALCIUM SERPL-MCNC: 8.3 MG/DL (ref 8.6–10.2)
CHLORIDE BLD-SCNC: 105 MEQ/L (ref 98–107)
CO2: 32 MEQ/L (ref 22–29)
CREAT SERPL-MCNC: 0.39 MG/DL (ref 0.5–0.9)
CULTURE, BLOOD 2: NORMAL
EKG ATRIAL RATE: 60 BPM
EKG ATRIAL RATE: 71 BPM
EKG P AXIS: 27 DEGREES
EKG P AXIS: 57 DEGREES
EKG P-R INTERVAL: 118 MS
EKG P-R INTERVAL: 204 MS
EKG Q-T INTERVAL: 410 MS
EKG Q-T INTERVAL: 418 MS
EKG QRS DURATION: 110 MS
EKG QRS DURATION: 90 MS
EKG QTC CALCULATION (BAZETT): 410 MS
EKG QTC CALCULATION (BAZETT): 454 MS
EKG R AXIS: 26 DEGREES
EKG R AXIS: 8 DEGREES
EKG T AXIS: 63 DEGREES
EKG T AXIS: 93 DEGREES
EKG VENTRICULAR RATE: 60 BPM
EKG VENTRICULAR RATE: 71 BPM
GFR AFRICAN AMERICAN: >60
GFR NON-AFRICAN AMERICAN: >60
GLUCOSE BLD-MCNC: 139 MG/DL (ref 74–109)
GLUCOSE BLD-MCNC: 142 MG/DL (ref 60–115)
GLUCOSE BLD-MCNC: 146 MG/DL (ref 60–115)
HCT VFR BLD CALC: 24.7 % (ref 37–47)
HEMOGLOBIN: 8.3 G/DL (ref 12–16)
MCH RBC QN AUTO: 26.5 PG (ref 27–31.3)
MCHC RBC AUTO-ENTMCNC: 33.5 % (ref 33–37)
MCV RBC AUTO: 78.9 FL (ref 82–100)
PDW BLD-RTO: 21.1 % (ref 11.5–14.5)
PERFORMED ON: ABNORMAL
PERFORMED ON: ABNORMAL
PLATELET # BLD: 311 K/UL (ref 130–400)
POTASSIUM SERPL-SCNC: 3.7 MEQ/L (ref 3.5–5.1)
RBC # BLD: 3.13 M/UL (ref 4.2–5.4)
SODIUM BLD-SCNC: 145 MEQ/L (ref 132–144)
WBC # BLD: 7.7 K/UL (ref 4.8–10.8)

## 2018-09-18 PROCEDURE — 2580000003 HC RX 258: Performed by: PSYCHIATRY & NEUROLOGY

## 2018-09-18 PROCEDURE — 6370000000 HC RX 637 (ALT 250 FOR IP): Performed by: INTERNAL MEDICINE

## 2018-09-18 PROCEDURE — 6370000000 HC RX 637 (ALT 250 FOR IP): Performed by: NURSE PRACTITIONER

## 2018-09-18 PROCEDURE — 85027 COMPLETE CBC AUTOMATED: CPT

## 2018-09-18 PROCEDURE — 80048 BASIC METABOLIC PNL TOTAL CA: CPT

## 2018-09-18 PROCEDURE — 2580000003 HC RX 258: Performed by: INTERNAL MEDICINE

## 2018-09-18 PROCEDURE — 6360000002 HC RX W HCPCS: Performed by: INTERNAL MEDICINE

## 2018-09-18 PROCEDURE — 6370000000 HC RX 637 (ALT 250 FOR IP): Performed by: PSYCHIATRY & NEUROLOGY

## 2018-09-18 PROCEDURE — 93005 ELECTROCARDIOGRAM TRACING: CPT

## 2018-09-18 PROCEDURE — 99232 SBSQ HOSP IP/OBS MODERATE 35: CPT | Performed by: INTERNAL MEDICINE

## 2018-09-18 RX ADMIN — PANTOPRAZOLE SODIUM 40 MG: 40 GRANULE, DELAYED RELEASE ORAL at 06:00

## 2018-09-18 RX ADMIN — MEROPENEM 1 G: 1 INJECTION, POWDER, FOR SOLUTION INTRAVENOUS at 15:29

## 2018-09-18 RX ADMIN — INSULIN LISPRO 1 UNITS: 100 INJECTION, SOLUTION INTRAVENOUS; SUBCUTANEOUS at 05:51

## 2018-09-18 RX ADMIN — MODAFINIL 100 MG: 100 TABLET ORAL at 15:30

## 2018-09-18 RX ADMIN — Medication 500 MG: at 08:36

## 2018-09-18 RX ADMIN — MODAFINIL 100 MG: 100 TABLET ORAL at 08:36

## 2018-09-18 RX ADMIN — ALTEPLASE 1 MG: 2.2 INJECTION, POWDER, LYOPHILIZED, FOR SOLUTION INTRAVENOUS at 04:20

## 2018-09-18 RX ADMIN — MINERAL SUPPLEMENT IRON 300 MG / 5 ML STRENGTH LIQUID 100 PER BOX UNFLAVORED 300 MG: at 08:36

## 2018-09-18 RX ADMIN — MEROPENEM 1 G: 1 INJECTION, POWDER, FOR SOLUTION INTRAVENOUS at 01:22

## 2018-09-18 RX ADMIN — Medication 10 ML: at 08:52

## 2018-09-18 RX ADMIN — Medication 10 ML: at 08:51

## 2018-09-18 RX ADMIN — INSULIN LISPRO 1 UNITS: 100 INJECTION, SOLUTION INTRAVENOUS; SUBCUTANEOUS at 13:31

## 2018-09-18 RX ADMIN — DOCUSATE SODIUM 100 MG: 50 LIQUID ORAL at 08:36

## 2018-09-18 RX ADMIN — MEMANTINE HYDROCHLORIDE 5 MG: 5 TABLET ORAL at 08:36

## 2018-09-18 RX ADMIN — FOLIC ACID 1 MG: 1 TABLET ORAL at 08:36

## 2018-09-18 RX ADMIN — Medication 10 ML: at 08:37

## 2018-09-18 RX ADMIN — Medication 10 ML: at 12:31

## 2018-09-18 RX ADMIN — FLUCONAZOLE 100 MG: 100 TABLET ORAL at 08:36

## 2018-09-18 ASSESSMENT — PAIN SCALES - PAIN ASSESSMENT IN ADVANCED DEMENTIA (PAINAD)
BREATHING: 0
CONSOLABILITY: 0
BODYLANGUAGE: 0
NEGVOCALIZATION: 0
FACIALEXPRESSION: 0
TOTALSCORE: 0

## 2018-09-18 ASSESSMENT — PAIN SCALES - GENERAL: PAINLEVEL_OUTOF10: 0

## 2018-09-18 NOTE — PROGRESS NOTES
cathflo injected into picc line.  Electronically signed by Scarlet Magana RN on 9/18/2018 at 4:21 AM

## 2018-09-18 NOTE — DISCHARGE SUMMARY
level of the clavicles. Interval placement of an enteric tube, which abruptly curves just proximal to the diaphragm likely secondary to the patient's hiatal hernia. Atherosclerotic ossification of the thoracic aorta. Continued mediastinal silhouette is within normal limits. Patchy bilateral opacities within each lung base. A pneumothorax or pleural effusion. Interval placement of endotracheal tube, terminating at the level of the clavicles. Enteric tube is present and abruptly turns proximal diaphragm likely due to the patient's hiatal hernia. Subsegmental atelectasis versus small infiltrate in both lung bases. Xr Chest Portable    Result Date: 9/1/2018  Portable chest radiograph History: Shortness of breath Technique: AP portable view of the chest obtained. Comparison: Chest x-ray from August 30, 2018 Findings: Atherosclerotic calcification of the thoracic aorta. Surgical clips project over the cardiac silhouette. The cardiomediastinal silhouette is within normal limits. No pneumothorax, pleural effusion, or focal consolidation. Osseous structures of the thorax  are intact. IMPRESSION: No acute intrathoracic process. Xr Chest Portable    Result Date: 9/1/2018  EXAMINATION: PORTABLE CHEST X-RAY FROM 9/1/2018 AT 10:45 HOURS CLINICAL HISTORY: CHECK RIGHT CENTRAL VENOUS LINE PLACEMENT COMPARISONS: 8/31/2018 FINDINGS: There is borderline cardiomegaly. There is atherosclerotic tortuous aorta. The distal tip of the right central venous catheter is extending into the right side of the neck and presumably within the right internal jugular vein and the right CVC should be repositioned. There is no pneumothorax. There is subsegmental atelectasis or developing small streaky infiltrate in the left lung base. Rounded lucency superimposed on the left cardiac silhouette may very well represent a hiatal hernia. There is degenerative bone spurring throughout the spine.      1. DISTAL TIP OF RIGHT CENTRAL VENOUS LINE IS IN RIGHT SIDE OF THE NECK AND RIGHT CVC SHOULD BE REPOSITIONED. 2. NO EVIDENCE OF A PNEUMOTHORAX POST RIGHT CVC LINE PLACEMENT. 3. SUSPECT SUBSEGMENTAL ATELECTASIS OR PERHAPS DEVELOPING SMALL INFILTRATE IN LEFT LUNG BASE. 4. BORDERLINE CARDIOMEGALY AND ATHEROSCLEROTIC TORTUOUS AORTA. Discharge Medications:       Risa Garrett, [address] Medication Instructions LVN:[id number]    Printed on:09/18/18 7730   Medication Information                      acetaminophen (TYLENOL) 325 MG tablet  Take 650 mg by mouth every 4 hours as needed             calcium carbonate 600 MG TABS tablet  Take 1 tablet by mouth daily             dextrose 5 % solution  Infuse 100 mL/hr intravenously as needed (Low blood sugar)             divalproex (DEPAKOTE SPRINKLE) 125 MG capsule  Take 125 mg by mouth daily             famotidine (PEPCID) 20 MG tablet  Take 20 mg by mouth nightly             ferrous sulfate 325 (65 Fe) MG tablet  Take 325 mg by mouth daily (with breakfast)             folic acid (FOLVITE) 1 MG tablet  Take 1 tablet by mouth daily             hypromellose 0.4 % SOLN ophthalmic solution  Place 1 drop into both eyes 2 times daily             magnesium hydroxide (MILK OF MAGNESIA) 400 MG/5ML suspension  Take 30 mLs by mouth every 4 hours as needed             memantine (NAMENDA) 5 MG tablet  Take 1 tablet by mouth daily             modafinil (PROVIGIL) 100 MG tablet  Take 1 tablet by mouth 2 times daily for 30 days. .             pantoprazole (PROTONIX) 40 MG tablet  Take 1 tablet by mouth daily             sucralfate (CARAFATE) 1 GM/10ML suspension  Take 1 g by mouth 3 times daily as needed                 Disposition:   LTAC   Condition at discharge: Pt was medically stable at the time of discharge. Activity: activity as tolerated    Total time taken for discharging this patient: 40 minutes. Greater than 70% of time was spent focused exclusively on this patient.  Time was taken to review chart, discuss plans with

## 2018-09-19 PROCEDURE — 93010 ELECTROCARDIOGRAM REPORT: CPT | Performed by: INTERNAL MEDICINE

## 2018-10-01 PROBLEM — N39.0 UTI (URINARY TRACT INFECTION): Status: RESOLVED | Noted: 2018-09-01 | Resolved: 2018-10-01

## 2018-10-24 NOTE — PLAN OF CARE
Problem: Falls - Risk of:  Goal: Will remain free from falls  Will remain free from falls   Outcome: Ongoing      Problem: Risk for Impaired Skin Integrity  Goal: Tissue integrity - skin and mucous membranes  Structural intactness and normal physiological function of skin and  mucous membranes.    Outcome: Ongoing      Problem: Infection  Goal: Will show no infection signs and symptoms  Outcome: Ongoing      Problem: IP SWALLOWING  Goal: LTG - patient will tolerate the least restrictive diet consistency to allow for safe consumption of daily meals  Outcome: Ongoing
Problem: Falls - Risk of:  Goal: Will remain free from falls  Will remain free from falls   Outcome: Ongoing    Goal: Absence of physical injury  Absence of physical injury   Outcome: Ongoing      Problem: Risk for Impaired Skin Integrity  Goal: Tissue integrity - skin and mucous membranes  Structural intactness and normal physiological function of skin and  mucous membranes.    Outcome: Ongoing      Problem: Infection  Goal: Will show no infection signs and symptoms  Outcome: Ongoing      Problem: FLUID AND ELECTROLYTE IMBALANCE  Goal: Patient will maintain adequate fluid volume and electrolyte balance  Outcome: Ongoing      Problem: IP SWALLOWING  Goal: LTG - patient will tolerate the least restrictive diet consistency to allow for safe consumption of daily meals  Outcome: Ongoing      Problem: Nutrition  Goal: Optimal nutrition therapy  Outcome: Ongoing
Problem: Falls - Risk of: Intervention: Assess potential safety hazards  Ongoing. Intervention: Assess risk factors for falls  Ongoing. Intervention: Assess medication that may increase risk of fall  Ongoing. Intervention: Assess fall prevention measures  Ongoing. Intervention: Manage a safe environment  Ongoing. Intervention: Implement fall precaution identifier  Ongoing. Intervention: Toileting assistance  Ongoing. Intervention: Appropriate assistance to ensure safe transfer  Ongoing.     Goal: Will remain free from falls  Will remain free from falls   Outcome: Ongoing
right arm

## 2019-01-01 NOTE — PROGRESS NOTES
Pharmacy Renal Adjustment Consult    Susan Bruno is a 75 y.o. female. Pharmacy has been consulted to renally adjust medications. Recent Labs      08/31/18   2315  09/02/18   0409   BUN  16  20       Recent Labs      09/02/18   0532  09/03/18   0539   CREATININE  1.9*  1.3*       Estimated Creatinine Clearance: 39 mL/min (A) (based on SCr of 1.3 mg/dL (H)).     Height:   Ht Readings from Last 1 Encounters:   09/01/18 5' 4\" (1.626 m)     Weight:  Wt Readings from Last 1 Encounters:   09/02/18 181 lb 10.5 oz (82.4 kg)           Plan: Adjust the following medications based on renal function:           Cefepime  1 g IV q 8 hr will be changed to 1 g q 12 hrs    Will follow daily    Tom Valadez [facility]  09/03/18  8:19 AM car seat

## 2019-01-08 ENCOUNTER — HOSPITAL ENCOUNTER (EMERGENCY)
Age: 74
Discharge: HOME | End: 2019-01-08
Payer: MEDICAID

## 2019-01-08 ENCOUNTER — APPOINTMENT (OUTPATIENT)
Dept: CT IMAGING | Age: 74
End: 2019-01-08
Payer: MEDICAID

## 2019-01-08 VITALS
RESPIRATION RATE: 16 BRPM | TEMPERATURE: 98.9 F | OXYGEN SATURATION: 97 % | DIASTOLIC BLOOD PRESSURE: 67 MMHG | SYSTOLIC BLOOD PRESSURE: 117 MMHG | HEART RATE: 96 BPM

## 2019-01-08 DIAGNOSIS — L89.319 PRESSURE INJURY OF SKIN OF RIGHT BUTTOCK, UNSPECIFIED INJURY STAGE: ICD-10-CM

## 2019-01-08 DIAGNOSIS — Z71.1 NO PROBLEM, FEARED COMPLAINT UNFOUNDED: Primary | ICD-10-CM

## 2019-01-08 LAB
ALBUMIN SERPL-MCNC: 2.2 G/DL (ref 3.9–4.9)
ALP BLD-CCNC: 131 U/L (ref 40–130)
ALT SERPL-CCNC: 20 U/L (ref 0–33)
ANION GAP SERPL CALCULATED.3IONS-SCNC: 13 MEQ/L (ref 7–13)
ANISOCYTOSIS: ABNORMAL
AST SERPL-CCNC: 32 U/L (ref 0–35)
BASOPHILS ABSOLUTE: 0.1 K/UL (ref 0–0.2)
BASOPHILS RELATIVE PERCENT: 0.4 %
BILIRUB SERPL-MCNC: 0.6 MG/DL (ref 0–1.2)
BUN BLDV-MCNC: 36 MG/DL (ref 8–23)
CALCIUM SERPL-MCNC: 8.9 MG/DL (ref 8.6–10.2)
CHLORIDE BLD-SCNC: 98 MEQ/L (ref 98–107)
CO2: 27 MEQ/L (ref 22–29)
CREAT SERPL-MCNC: 0.55 MG/DL (ref 0.5–0.9)
EOSINOPHILS ABSOLUTE: 0 K/UL (ref 0–0.7)
EOSINOPHILS RELATIVE PERCENT: 0.1 %
GFR AFRICAN AMERICAN: >60
GFR NON-AFRICAN AMERICAN: >60
GLOBULIN: 5.8 G/DL (ref 2.3–3.5)
GLUCOSE BLD-MCNC: 234 MG/DL (ref 74–109)
HCT VFR BLD CALC: 29.8 % (ref 37–47)
HEMOGLOBIN: 9.7 G/DL (ref 12–16)
HYPOCHROMIA: ABNORMAL
INR BLD: 1.1
LYMPHOCYTES ABSOLUTE: 1.3 K/UL (ref 1–4.8)
LYMPHOCYTES RELATIVE PERCENT: 7.3 %
MCH RBC QN AUTO: 23.6 PG (ref 27–31.3)
MCHC RBC AUTO-ENTMCNC: 32.4 % (ref 33–37)
MCV RBC AUTO: 72.8 FL (ref 82–100)
MICROCYTES: ABNORMAL
MONOCYTES ABSOLUTE: 0.8 K/UL (ref 0.2–0.8)
MONOCYTES RELATIVE PERCENT: 4.3 %
NEUTROPHILS ABSOLUTE: 15.5 K/UL (ref 1.4–6.5)
NEUTROPHILS RELATIVE PERCENT: 87.9 %
PDW BLD-RTO: 20.1 % (ref 11.5–14.5)
PLATELET # BLD: 276 K/UL (ref 130–400)
POTASSIUM SERPL-SCNC: 4.8 MEQ/L (ref 3.5–5.1)
PROTHROMBIN TIME: 11.5 SEC (ref 9–11.5)
RBC # BLD: 4.1 M/UL (ref 4.2–5.4)
SODIUM BLD-SCNC: 138 MEQ/L (ref 132–144)
TOTAL PROTEIN: 8 G/DL (ref 6.4–8.1)
WBC # BLD: 17.6 K/UL (ref 4.8–10.8)

## 2019-01-08 PROCEDURE — 80053 COMPREHEN METABOLIC PANEL: CPT

## 2019-01-08 PROCEDURE — 85610 PROTHROMBIN TIME: CPT

## 2019-01-08 PROCEDURE — 85025 COMPLETE CBC W/AUTO DIFF WBC: CPT

## 2019-01-08 PROCEDURE — 36415 COLL VENOUS BLD VENIPUNCTURE: CPT

## 2019-01-08 PROCEDURE — 99285 EMERGENCY DEPT VISIT HI MDM: CPT

## 2019-01-08 PROCEDURE — 74176 CT ABD & PELVIS W/O CONTRAST: CPT

## 2019-01-08 PROCEDURE — 2580000003 HC RX 258: Performed by: NURSE PRACTITIONER

## 2019-01-08 RX ORDER — 0.9 % SODIUM CHLORIDE 0.9 %
1000 INTRAVENOUS SOLUTION INTRAVENOUS ONCE
Status: COMPLETED | OUTPATIENT
Start: 2019-01-08 | End: 2019-01-08

## 2019-01-08 RX ADMIN — SODIUM CHLORIDE 1000 ML: 9 INJECTION, SOLUTION INTRAVENOUS at 16:57

## 2019-01-31 ENCOUNTER — HOSPITAL ENCOUNTER (EMERGENCY)
Age: 74
Discharge: HOME | End: 2019-01-31
Attending: STUDENT IN AN ORGANIZED HEALTH CARE EDUCATION/TRAINING PROGRAM
Payer: MEDICAID

## 2019-01-31 VITALS
HEART RATE: 88 BPM | DIASTOLIC BLOOD PRESSURE: 84 MMHG | RESPIRATION RATE: 18 BRPM | SYSTOLIC BLOOD PRESSURE: 153 MMHG | OXYGEN SATURATION: 100 % | TEMPERATURE: 97.6 F

## 2019-01-31 DIAGNOSIS — E11.65 TYPE 2 DIABETES MELLITUS WITH HYPERGLYCEMIA, UNSPECIFIED WHETHER LONG TERM INSULIN USE (HCC): ICD-10-CM

## 2019-01-31 DIAGNOSIS — Z13.9 ENCOUNTER FOR MEDICAL SCREENING EXAMINATION: Primary | ICD-10-CM

## 2019-01-31 LAB
ABO/RH: NORMAL
ALBUMIN SERPL-MCNC: 2.7 G/DL (ref 3.9–4.9)
ALP BLD-CCNC: 136 U/L (ref 40–130)
ALT SERPL-CCNC: 11 U/L (ref 0–33)
ANION GAP SERPL CALCULATED.3IONS-SCNC: 12 MEQ/L (ref 7–13)
ANISOCYTOSIS: ABNORMAL
ANTIBODY SCREEN: NORMAL
APTT: 23.8 SEC (ref 21.6–35.4)
AST SERPL-CCNC: 17 U/L (ref 0–35)
BASOPHILS ABSOLUTE: 0.1 K/UL (ref 0–0.2)
BASOPHILS RELATIVE PERCENT: 0.7 %
BILIRUB SERPL-MCNC: 0.5 MG/DL (ref 0–1.2)
BUN BLDV-MCNC: 22 MG/DL (ref 8–23)
CALCIUM SERPL-MCNC: 9.3 MG/DL (ref 8.6–10.2)
CHLORIDE BLD-SCNC: 100 MEQ/L (ref 98–107)
CO2: 29 MEQ/L (ref 22–29)
CREAT SERPL-MCNC: 0.29 MG/DL (ref 0.5–0.9)
EOSINOPHILS ABSOLUTE: 0 K/UL (ref 0–0.7)
EOSINOPHILS RELATIVE PERCENT: 0.2 %
GFR AFRICAN AMERICAN: >60
GFR NON-AFRICAN AMERICAN: >60
GLOBULIN: 5.7 G/DL (ref 2.3–3.5)
GLUCOSE BLD-MCNC: 171 MG/DL (ref 74–109)
HCT VFR BLD CALC: 31 % (ref 37–47)
HEMOGLOBIN: 10.2 G/DL (ref 12–16)
INR BLD: 1.1
LYMPHOCYTES ABSOLUTE: 0.9 K/UL (ref 1–4.8)
LYMPHOCYTES RELATIVE PERCENT: 11.3 %
MCH RBC QN AUTO: 24.2 PG (ref 27–31.3)
MCHC RBC AUTO-ENTMCNC: 32.9 % (ref 33–37)
MCV RBC AUTO: 73.8 FL (ref 82–100)
MICROCYTES: ABNORMAL
MONOCYTES ABSOLUTE: 0.3 K/UL (ref 0.2–0.8)
MONOCYTES RELATIVE PERCENT: 4 %
NEUTROPHILS ABSOLUTE: 6.8 K/UL (ref 1.4–6.5)
NEUTROPHILS RELATIVE PERCENT: 83.8 %
PDW BLD-RTO: 21.8 % (ref 11.5–14.5)
PLATELET # BLD: 243 K/UL (ref 130–400)
POTASSIUM SERPL-SCNC: 3.8 MEQ/L (ref 3.5–5.1)
PROTHROMBIN TIME: 10.9 SEC (ref 9–11.5)
RBC # BLD: 4.2 M/UL (ref 4.2–5.4)
SLIDE REVIEW: ABNORMAL
SODIUM BLD-SCNC: 141 MEQ/L (ref 132–144)
TOTAL PROTEIN: 8.4 G/DL (ref 6.4–8.1)
WBC # BLD: 8.1 K/UL (ref 4.8–10.8)

## 2019-01-31 PROCEDURE — 85610 PROTHROMBIN TIME: CPT

## 2019-01-31 PROCEDURE — 86901 BLOOD TYPING SEROLOGIC RH(D): CPT

## 2019-01-31 PROCEDURE — 85730 THROMBOPLASTIN TIME PARTIAL: CPT

## 2019-01-31 PROCEDURE — 86900 BLOOD TYPING SEROLOGIC ABO: CPT

## 2019-01-31 PROCEDURE — 86850 RBC ANTIBODY SCREEN: CPT

## 2019-01-31 PROCEDURE — 36415 COLL VENOUS BLD VENIPUNCTURE: CPT

## 2019-01-31 PROCEDURE — 80053 COMPREHEN METABOLIC PANEL: CPT

## 2019-01-31 PROCEDURE — 85025 COMPLETE CBC W/AUTO DIFF WBC: CPT

## 2019-01-31 PROCEDURE — 99284 EMERGENCY DEPT VISIT MOD MDM: CPT

## 2019-01-31 ASSESSMENT — ENCOUNTER SYMPTOMS
CHEST TIGHTNESS: 0
VOMITING: 0
TROUBLE SWALLOWING: 0
DIARRHEA: 0
SINUS PRESSURE: 0
SHORTNESS OF BREATH: 0
ABDOMINAL PAIN: 0
COUGH: 0
BACK PAIN: 0

## 2019-04-03 LAB
ANION GAP SERPL CALCULATED.3IONS-SCNC: 11 MEQ/L (ref 9–15)
ANION GAP SERPL CALCULATED.3IONS-SCNC: 12 MEQ/L (ref 9–15)
ANISOCYTOSIS: ABNORMAL
BASOPHILS ABSOLUTE: 0 K/UL (ref 0–0.2)
BASOPHILS RELATIVE PERCENT: 0.6 %
BUN BLDV-MCNC: 37 MG/DL (ref 8–23)
BUN BLDV-MCNC: 38 MG/DL (ref 8–23)
CALCIUM SERPL-MCNC: 8 MG/DL (ref 8.5–9.9)
CALCIUM SERPL-MCNC: 8.3 MG/DL (ref 8.5–9.9)
CHLORIDE BLD-SCNC: 98 MEQ/L (ref 95–107)
CHLORIDE BLD-SCNC: 99 MEQ/L (ref 95–107)
CO2: 26 MEQ/L (ref 20–31)
CO2: 30 MEQ/L (ref 20–31)
CREAT SERPL-MCNC: 0.46 MG/DL (ref 0.5–0.9)
CREAT SERPL-MCNC: 0.47 MG/DL (ref 0.5–0.9)
EOSINOPHILS ABSOLUTE: 0 K/UL (ref 0–0.7)
EOSINOPHILS RELATIVE PERCENT: 0.4 %
GFR AFRICAN AMERICAN: >60
GFR AFRICAN AMERICAN: >60
GFR NON-AFRICAN AMERICAN: >60
GFR NON-AFRICAN AMERICAN: >60
GLUCOSE BLD-MCNC: 139 MG/DL (ref 70–99)
GLUCOSE BLD-MCNC: 162 MG/DL (ref 70–99)
HCT VFR BLD CALC: 26 % (ref 37–47)
HEMOGLOBIN: 7.9 G/DL (ref 12–16)
HYPOCHROMIA: ABNORMAL
LYMPHOCYTES ABSOLUTE: 1.3 K/UL (ref 1–4.8)
LYMPHOCYTES RELATIVE PERCENT: 8 %
MCH RBC QN AUTO: 20.1 PG (ref 27–31.3)
MCHC RBC AUTO-ENTMCNC: 30.4 % (ref 33–37)
MCV RBC AUTO: 66 FL (ref 82–100)
MICROCYTES: ABNORMAL
MONOCYTES ABSOLUTE: 1.4 K/UL (ref 0.2–0.8)
MONOCYTES RELATIVE PERCENT: 9.4 %
NEUTROPHILS ABSOLUTE: 13 K/UL (ref 1.4–6.5)
NEUTROPHILS RELATIVE PERCENT: 83 %
PDW BLD-RTO: 22.2 % (ref 11.5–14.5)
PLATELET # BLD: 222 K/UL (ref 130–400)
PLATELET SLIDE REVIEW: NORMAL
POIKILOCYTES: ABNORMAL
POTASSIUM SERPL-SCNC: 3.6 MEQ/L (ref 3.4–4.9)
POTASSIUM SERPL-SCNC: 5.8 MEQ/L (ref 3.4–4.9)
RBC # BLD: 3.94 M/UL (ref 4.2–5.4)
SODIUM BLD-SCNC: 135 MEQ/L (ref 135–144)
SODIUM BLD-SCNC: 141 MEQ/L (ref 135–144)
TARGET CELLS: ABNORMAL
WBC # BLD: 15.7 K/UL (ref 4.8–10.8)

## 2020-04-17 ENCOUNTER — APPOINTMENT (OUTPATIENT)
Dept: GENERAL RADIOLOGY | Age: 75
End: 2020-04-17
Payer: MEDICARE

## 2020-04-17 ENCOUNTER — HOSPITAL ENCOUNTER (EMERGENCY)
Age: 75
Discharge: HOME | End: 2020-04-17
Attending: EMERGENCY MEDICINE
Payer: MEDICARE

## 2020-04-17 VITALS
RESPIRATION RATE: 18 BRPM | TEMPERATURE: 97.9 F | HEART RATE: 84 BPM | HEIGHT: 65 IN | OXYGEN SATURATION: 98 % | WEIGHT: 230 LBS | DIASTOLIC BLOOD PRESSURE: 61 MMHG | SYSTOLIC BLOOD PRESSURE: 127 MMHG | BODY MASS INDEX: 38.32 KG/M2

## 2020-04-17 LAB
ALBUMIN SERPL-MCNC: 2 G/DL (ref 3.5–4.6)
ALP BLD-CCNC: 180 U/L (ref 40–130)
ALT SERPL-CCNC: 21 U/L (ref 0–33)
ANION GAP SERPL CALCULATED.3IONS-SCNC: 9 MEQ/L (ref 9–15)
AST SERPL-CCNC: 21 U/L (ref 0–35)
BASOPHILS ABSOLUTE: 0.1 K/UL (ref 0–0.2)
BASOPHILS RELATIVE PERCENT: 0.5 %
BILIRUB SERPL-MCNC: 0.8 MG/DL (ref 0.2–0.7)
BUN BLDV-MCNC: 38 MG/DL (ref 8–23)
CALCIUM SERPL-MCNC: 8.2 MG/DL (ref 8.5–9.9)
CHLORIDE BLD-SCNC: 97 MEQ/L (ref 95–107)
CO2: 32 MEQ/L (ref 20–31)
CREAT SERPL-MCNC: 0.43 MG/DL (ref 0.5–0.9)
EKG ATRIAL RATE: 87 BPM
EKG P AXIS: 52 DEGREES
EKG P-R INTERVAL: 130 MS
EKG Q-T INTERVAL: 414 MS
EKG QRS DURATION: 92 MS
EKG QTC CALCULATION (BAZETT): 498 MS
EKG R AXIS: -7 DEGREES
EKG T AXIS: 0 DEGREES
EKG VENTRICULAR RATE: 87 BPM
EOSINOPHILS ABSOLUTE: 0 K/UL (ref 0–0.7)
EOSINOPHILS RELATIVE PERCENT: 0.2 %
GFR AFRICAN AMERICAN: >60
GFR NON-AFRICAN AMERICAN: >60
GLOBULIN: 4.8 G/DL (ref 2.3–3.5)
GLUCOSE BLD-MCNC: 176 MG/DL (ref 70–99)
HCT VFR BLD CALC: 28.3 % (ref 37–47)
HEMOGLOBIN: 9.2 G/DL (ref 12–16)
INFLUENZA A BY PCR: NEGATIVE
INFLUENZA B BY PCR: NEGATIVE
LACTIC ACID: 0.9 MMOL/L (ref 0.5–2.2)
LYMPHOCYTES ABSOLUTE: 1.1 K/UL (ref 1–4.8)
LYMPHOCYTES RELATIVE PERCENT: 9.2 %
MCH RBC QN AUTO: 25.3 PG (ref 27–31.3)
MCHC RBC AUTO-ENTMCNC: 32.6 % (ref 33–37)
MCV RBC AUTO: 77.7 FL (ref 82–100)
MONOCYTES ABSOLUTE: 1 K/UL (ref 0.2–0.8)
MONOCYTES RELATIVE PERCENT: 8.2 %
NEUTROPHILS ABSOLUTE: 10.1 K/UL (ref 1.4–6.5)
NEUTROPHILS RELATIVE PERCENT: 81.9 %
PDW BLD-RTO: 19 % (ref 11.5–14.5)
PLATELET # BLD: 161 K/UL (ref 130–400)
POTASSIUM SERPL-SCNC: 3 MEQ/L (ref 3.4–4.9)
RBC # BLD: 3.64 M/UL (ref 4.2–5.4)
SARS-COV-2, NAAT: NOT DETECTED
SODIUM BLD-SCNC: 138 MEQ/L (ref 135–144)
TOTAL PROTEIN: 6.8 G/DL (ref 6.3–8)
TROPONIN: <0.01 NG/ML (ref 0–0.01)
WBC # BLD: 12.3 K/UL (ref 4.8–10.8)

## 2020-04-17 PROCEDURE — 36415 COLL VENOUS BLD VENIPUNCTURE: CPT

## 2020-04-17 PROCEDURE — 99284 EMERGENCY DEPT VISIT MOD MDM: CPT

## 2020-04-17 PROCEDURE — U0002 COVID-19 LAB TEST NON-CDC: HCPCS

## 2020-04-17 PROCEDURE — 71045 X-RAY EXAM CHEST 1 VIEW: CPT

## 2020-04-17 PROCEDURE — 87040 BLOOD CULTURE FOR BACTERIA: CPT

## 2020-04-17 PROCEDURE — 93005 ELECTROCARDIOGRAM TRACING: CPT | Performed by: EMERGENCY MEDICINE

## 2020-04-17 PROCEDURE — 83605 ASSAY OF LACTIC ACID: CPT

## 2020-04-17 PROCEDURE — 87502 INFLUENZA DNA AMP PROBE: CPT

## 2020-04-17 PROCEDURE — 85025 COMPLETE CBC W/AUTO DIFF WBC: CPT

## 2020-04-17 PROCEDURE — 80053 COMPREHEN METABOLIC PANEL: CPT

## 2020-04-17 PROCEDURE — 6370000000 HC RX 637 (ALT 250 FOR IP): Performed by: EMERGENCY MEDICINE

## 2020-04-17 PROCEDURE — 84484 ASSAY OF TROPONIN QUANT: CPT

## 2020-04-17 RX ORDER — SODIUM CHLORIDE 9 MG/ML
INJECTION, SOLUTION INTRAVENOUS CONTINUOUS
Status: DISCONTINUED | OUTPATIENT
Start: 2020-04-17 | End: 2020-04-17 | Stop reason: HOSPADM

## 2020-04-17 RX ORDER — POTASSIUM CHLORIDE 750 MG/1
10 TABLET, EXTENDED RELEASE ORAL DAILY
Qty: 10 TABLET | Refills: 0 | Status: SHIPPED | OUTPATIENT
Start: 2020-04-17 | End: 2020-08-07

## 2020-04-17 RX ADMIN — POTASSIUM BICARBONATE 40 MEQ: 782 TABLET, EFFERVESCENT ORAL at 13:28

## 2020-04-17 ASSESSMENT — PAIN SCALES - PAIN ASSESSMENT IN ADVANCED DEMENTIA (PAINAD)
TOTALSCORE: 0
NEGVOCALIZATION: 0
BREATHING: 0
CONSOLABILITY: 0
FACIALEXPRESSION: 0
BODYLANGUAGE: 0

## 2020-04-17 NOTE — ED PROVIDER NOTES
Stress: None   Relationships    Social connections     Talks on phone: None     Gets together: None     Attends [employer] service: None     Active member of club or organization: None     Attends meetings of clubs or organizations: None     Relationship status: None    Intimate partner violence     Fear of current or ex partner: None     Emotionally abused: None     Physically abused: None     Forced sexual activity: None   Other Topics Concern    None   Social History Narrative    None       SCREENINGS              PHYSICAL EXAM    (up to 7 for level 4, 8 or more for level 5)     ED Triage Vitals   BP Temp Temp Source Pulse Resp SpO2 Height Weight   04/17/20 1028 04/17/20 1028 04/17/20 1028 04/17/20 1028 04/17/20 1043 04/17/20 1043 04/17/20 1028 04/17/20 1028   (!) 150/66 97.9 °F (36.6 °C) Oral 84 18 96 % 5' 5\" (1.651 m) 230 lb (104.3 kg)       Physical Exam  Vitals signs and nursing note reviewed. Constitutional:       General: She is not in acute distress. Appearance: She is well-developed. She is not diaphoretic. HENT:      Head: Normocephalic and atraumatic. Right Ear: External ear normal.      Left Ear: External ear normal.      Mouth/Throat:      Pharynx: No oropharyngeal exudate. Eyes:      Conjunctiva/sclera: Conjunctivae normal.      Pupils: Pupils are equal, round, and reactive to light. Neck:      Musculoskeletal: Normal range of motion and neck supple. Thyroid: No thyromegaly. Vascular: No JVD. Trachea: No tracheal deviation. Cardiovascular:      Rate and Rhythm: Normal rate. Heart sounds: Normal heart sounds. No murmur. Pulmonary:      Effort: Pulmonary effort is normal. No respiratory distress. Breath sounds: Normal breath sounds. No wheezing, rhonchi or rales. Abdominal:      General: Bowel sounds are normal.      Palpations: Abdomen is soft. Tenderness: There is no abdominal tenderness. There is no guarding.    Musculoskeletal: Normal range of ofclinically significant/life threatening deterioration in the patient's condition which required my urgent intervention. CONSULTS:  None    PROCEDURES:  Unless otherwise noted below, none     Procedures    FINAL IMPRESSION      1. Pneumonia due to organism    2. Hypokalemia          DISPOSITION/PLAN   DISPOSITION        PATIENT REFERREDTO:  Girish Fabian MD  [address], [address] [address]  [phone]      As needed      DISCHARGEMEDICATIONS:  Discharge Medication List as of 4/17/2020  1:21 PM      START taking these medications    Details   potassium chloride (KLOR-CON M) 10 MEQ extended release tablet Take 1 tablet by mouth daily, Disp-10 tablet, R-0Print           Controlled Substances Monitoring:     No flowsheet data found.     (Please note that portions of this note were completed with a voice recognition program.  Efforts were made to edit the dictations but occasionally words are mis-transcribed.)    Alena Umaña DO (electronically signed)  Attending Emergency Physician         Alena Umaña DO  04/17/20 4427

## 2020-04-20 PROCEDURE — 93010 ELECTROCARDIOGRAM REPORT: CPT | Performed by: INTERNAL MEDICINE

## 2020-04-22 LAB
BLOOD CULTURE, ROUTINE: NORMAL
CULTURE, BLOOD 2: NORMAL

## 2020-08-07 ENCOUNTER — OFFICE VISIT (OUTPATIENT)
Dept: PALLATIVE CARE | Age: 75
End: 2020-08-07
Payer: MEDICARE

## 2020-08-07 VITALS
DIASTOLIC BLOOD PRESSURE: 63 MMHG | RESPIRATION RATE: 14 BRPM | OXYGEN SATURATION: 100 % | HEART RATE: 60 BPM | SYSTOLIC BLOOD PRESSURE: 128 MMHG | TEMPERATURE: 97.4 F

## 2020-08-07 PROBLEM — Z71.1 PERSON WITH FEARED HEALTH COMPLAINT IN WHOM NO DIAGNOSIS IS MADE: Status: ACTIVE | Noted: 2019-01-09

## 2020-08-07 PROBLEM — I69.391 DYSPHAGIA AS LATE EFFECT OF CEREBROVASCULAR ACCIDENT (CVA): Status: ACTIVE | Noted: 2019-04-03

## 2020-08-07 PROBLEM — A49.8 BACTERIAL INFECTION DUE TO PROTEUS MIRABILIS: Status: ACTIVE | Noted: 2019-04-12

## 2020-08-07 PROBLEM — K94.21 GASTROSTOMY HEMORRHAGE (HCC): Status: ACTIVE | Noted: 2019-01-09

## 2020-08-07 PROBLEM — E11.65 TYPE 2 DIABETES MELLITUS WITH HYPERGLYCEMIA (HCC): Status: ACTIVE | Noted: 2019-01-09

## 2020-08-07 PROBLEM — Z91.89: Status: ACTIVE | Noted: 2019-04-03

## 2020-08-07 PROBLEM — Z86.73 HISTORY OF STROKE: Status: ACTIVE | Noted: 2019-04-03

## 2020-08-07 PROBLEM — R53.83 LETHARGY: Status: ACTIVE | Noted: 2019-04-03

## 2020-08-07 PROBLEM — M19.90 UNSPECIFIED OSTEOARTHRITIS, UNSPECIFIED SITE: Status: ACTIVE | Noted: 2019-01-09

## 2020-08-07 PROBLEM — Z93.1 STATUS POST INSERTION OF PERCUTANEOUS ENDOSCOPIC GASTROSTOMY (PEG) TUBE (HCC): Status: ACTIVE | Noted: 2019-01-09

## 2020-08-07 PROBLEM — Z78.9 ON TUBE FEEDING DIET: Status: ACTIVE | Noted: 2020-01-17

## 2020-08-07 PROBLEM — D72.829 LEUKOCYTOSIS: Status: ACTIVE | Noted: 2019-04-12

## 2020-08-07 PROBLEM — Z79.4 LONG TERM (CURRENT) USE OF INSULIN (HCC): Status: ACTIVE | Noted: 2019-01-09

## 2020-08-07 PROBLEM — R50.9 FEVER: Status: ACTIVE | Noted: 2019-04-03

## 2020-08-07 PROBLEM — E86.0 DEHYDRATION WITH HYPERNATREMIA: Status: ACTIVE | Noted: 2020-01-17

## 2020-08-07 PROBLEM — L89.153 PRESSURE INJURY OF SACRAL REGION, STAGE 3 (HCC): Status: ACTIVE | Noted: 2019-04-03

## 2020-08-07 PROBLEM — D64.9 ACUTE ANEMIA: Status: ACTIVE | Noted: 2019-04-12

## 2020-08-07 PROBLEM — L89.313 PRESSURE INJURY OF RIGHT BUTTOCK, STAGE 3 (HCC): Status: ACTIVE | Noted: 2019-01-09

## 2020-08-07 PROBLEM — E87.0 DEHYDRATION WITH HYPERNATREMIA: Status: ACTIVE | Noted: 2020-01-17

## 2020-08-07 PROCEDURE — 99344 HOME/RES VST NEW MOD MDM 60: CPT | Performed by: NURSE PRACTITIONER

## 2020-08-07 RX ORDER — LANOLIN ALCOHOL/MO/W.PET/CERES
1000 CREAM (GRAM) TOPICAL DAILY
COMMUNITY

## 2020-08-07 RX ORDER — FUROSEMIDE 10 MG/ML
SOLUTION ORAL DAILY
COMMUNITY

## 2020-08-07 RX ORDER — POTASSIUM CHLORIDE 3 G/15ML
15 SOLUTION ORAL DAILY
COMMUNITY
End: 2020-08-20 | Stop reason: SDUPTHER

## 2020-08-07 RX ORDER — METHENAMINE HIPPURATE 1000 MG/1
1 TABLET ORAL NIGHTLY
COMMUNITY

## 2020-08-07 RX ORDER — POLYETHYLENE GLYCOL 3350 17 G/17G
17 POWDER, FOR SOLUTION ORAL DAILY
COMMUNITY

## 2020-08-07 RX ORDER — INSULIN GLARGINE 100 [IU]/ML
6 INJECTION, SOLUTION SUBCUTANEOUS NIGHTLY
COMMUNITY

## 2020-08-07 RX ORDER — LACTULOSE 10 G/15ML
30 SOLUTION ORAL NIGHTLY
COMMUNITY

## 2020-08-07 RX ORDER — SPIRONOLACTONE 25 MG/1
12.5 TABLET ORAL DAILY
COMMUNITY

## 2020-08-07 ASSESSMENT — ENCOUNTER SYMPTOMS
ALLERGIC/IMMUNOLOGIC NEGATIVE: 1
WHEEZING: 0
SHORTNESS OF BREATH: 0
NAUSEA: 0
DIARRHEA: 0
CONSTIPATION: 0
EYES NEGATIVE: 1

## 2020-08-07 NOTE — PROGRESS NOTES
Subjective:        Patient Id: Seen Leatha at  home in [facility], for initial palliative medicine consult for symptom management, advance care planning and goals of care discussion. She was accompanied to the appointment by: Her daughter. Chief Complaint   Patient presents with    Referral - General      HPI       Jacinta Sotelo is a 67 y.o. female referred to palliative care by Dr. Merry Cooper for symptom management, advance care planning, and goals of care conversation. Leatha has complex medical history that includes Vascular Dementia, Bipolar Disorder, CHF, DM2, Dysphagia, and Hx of CVA. Home visit is necessary in lieu of office due to significant frailty and high symptom burden from comorbid illnesses. Patient observed laying in bed. She is pleasant and cooperative. She is in NAD. Her daughter provides all the history. Patient has expressive aphasia due to her recent CVA. Patient was discharged to home from [address] [place] [address] last Friday. She was there for SNF due to her most recent CVA. Patient has a stage IV to her coccyx area and a stage 1 to her buttocks. [facility]. She has SN, PT, OT, SLP and Home Health Aid services. She has PRN oxygen that she uses at night time. She has tube feeding. Her daughter is overwhelmed due to medication confusion with the patients discharged records. Home Health Nurse called the PCP and reconciled patient's medication. Patient is incontinent of bowel and bladder. She has had some weight loss while she was in the nursing home. She is non-ambulatory     Denies significant sleep disturbance, depression, anxiety, or agitation episodes; denies suicidal or homicidal ideation or signs suggesting existential grief or spiritual pain.      Past Medical History:   Diagnosis Date    Alzheimer disease ([facility]     Alzheimer's dementia ([facility]     Diabetes mellitus ([facility]     GERD (gastroesophageal reflux disease)     Hypertension     Osteoarthritis     Retinopathy      Past Surgical History:   Procedure Laterality Date    EYE SURGERY      HYSTERECTOMY      [place] EGD PERCUTANEOUS PLACEMENT GASTROSTOMY TUBE N/A 9/11/2018    EGD ESOPHAGOGASTRODUODENOSCOPY PEG TUBE INSERTION performed by Ashwin Fleming MD at [address] [place] [address] EGD TRANSORAL BIOPSY SINGLE/MULTIPLE N/A 1/21/2017    EGD BIOPSY performed by Risa Dominguez MD at [address] [place] [address] EGD TRANSORAL BIOPSY SINGLE/MULTIPLE N/A 6/7/2018    EGD performed by Risa Dominguez MD at [facility] N/A 9/11/2018    EGD ESOPHAGOGASTRODUODENOSCOPY ROOM 179-1 performed by Risa Dominguez MD at [facility] Marital status: Single     Spouse name: Not on file    Number of children: Not on file    Years of education: Not on file    Highest education level: Not on file   Occupational History    Not on file   Social Needs    Financial resource strain: Not on file    Food insecurity     Worry: Not on file     Inability: Not on file    Transportation needs     Medical: Not on file     Non-medical: Not on file   Tobacco Use    Smoking status: Never Smoker    Smokeless tobacco: Never Used   Substance and Sexual Activity    Alcohol use: No    Drug use: No    Sexual activity: Not on file   Lifestyle    Physical activity     Days per week: Not on file     Minutes per session: Not on file    Stress: Not on file   Relationships    Social connections     Talks on phone: Not on file     Gets together: Not on file     Attends Hinduism service: Not on file     Active member of club or organization: Not on file     Attends meetings of clubs or organizations: Not on file     Relationship status: Not on file    Intimate partner violence     Fear of current or ex partner: Not on file     Emotionally abused: Not on file     Physically abused: Not on file     Forced sexual activity: Not on file   Other Topics Concern    Not on file   Social History Narrative    Not on file     No family history on file. No Known Allergies  Current Outpatient Medications on File Prior to Visit   Medication Sig Dispense Refill    lactulose (CHRONULAC) 10 GM/15ML solution Take 30 mLs by mouth nightly      polyethylene glycol (GLYCOLAX) 17 GM/SCOOP powder Take 17 g by mouth daily      methenamine (HIPREX) 1 g tablet Take 1 g by mouth nightly      furosemide (LASIX) 10 MG/ML solution Take by mouth daily      vitamin B-12 (CYANOCOBALAMIN) 1000 MCG tablet Take 1,000 mcg by mouth daily      Iron Dextran-Folic FIEZ-F90 [id number] MG-MCG/5ML LIQD Take 5 mLs by mouth      spironolactone (ALDACTONE) 25 MG tablet Take 12.5 mg by mouth daily      Potassium Chloride 40 MEQ/15ML (20%) SOLN Take 15 mLs by mouth daily      insulin glargine (LANTUS) 100 UNIT/ML injection vial Inject 6 Units into the skin nightly      sucralfate (CARAFATE) 1 GM/10ML suspension Take 1 g by mouth 3 times daily as needed      acetaminophen (TYLENOL) 325 MG tablet Take 650 mg by mouth every 4 hours as needed      magnesium hydroxide (MILK OF MAGNESIA) 400 MG/5ML suspension Take 30 mLs by mouth every 4 hours as needed      divalproex (DEPAKOTE SPRINKLE) 125 MG capsule Take 125 mg by mouth daily       No current facility-administered medications on file prior to visit. Review of Systems   Constitutional: Negative for activity change, appetite change, fatigue and unexpected weight change. HENT: Positive for trouble swallowing. Eyes: Negative. Respiratory: Negative for shortness of breath and wheezing. Cardiovascular: Negative for leg swelling. Gastrointestinal: Negative for constipation, diarrhea and nausea. Endocrine: Negative. Genitourinary: Negative. Musculoskeletal: Positive for gait problem. Skin: Positive for wound. Negative for pallor. Allergic/Immunologic: Negative. Neurological: Positive for weakness. Negative for dizziness. Psychiatric/Behavioral: Negative for confusion. Objective:   /63 (Site: Left Upper Arm, Position: Supine, Cuff Size: Medium Adult)   Pulse 60   Temp 97.4 °F (36.3 °C)   Resp 14   SpO2 100%    Wt Readings from Last 3 Encounters:   04/17/20 230 lb (104.3 kg)   09/17/18 241 lb 2.9 oz (109.4 kg)   08/30/18 169 lb 6 oz (76.8 kg)     Physical Exam  Vitals signs reviewed. Constitutional:       General: She is not in acute distress. Appearance: She is well-developed. She is not diaphoretic. HENT:      Head: Normocephalic and atraumatic. Right Ear: External ear normal.      Left Ear: External ear normal.      Nose: Nose normal.      Mouth/Throat:      Pharynx: No oropharyngeal exudate. Eyes:      General: No scleral icterus. Right eye: No discharge. Left eye: No discharge. Conjunctiva/sclera: Conjunctivae normal.      Pupils: Pupils are equal, round, and reactive to light. Neck:      Musculoskeletal: Normal range of motion and neck supple. Thyroid: No thyromegaly. Vascular: No JVD. Trachea: No tracheal deviation. Cardiovascular:      Rate and Rhythm: Normal rate and regular rhythm. Heart sounds: Normal heart sounds. No murmur. No friction rub. No gallop. Pulmonary:      Effort: Pulmonary effort is normal. No respiratory distress. Breath sounds: Normal breath sounds. No stridor. No wheezing or rales. Chest:      Chest wall: No tenderness. Abdominal:      General: Bowel sounds are normal. There is no distension. Palpations: Abdomen is soft. There is no mass. Tenderness: There is no abdominal tenderness. There is no guarding or rebound. Hernia: No hernia is present. Genitourinary:     Rectum: Guaiac result negative. Musculoskeletal: Normal range of motion. General: No tenderness or deformity. Lymphadenopathy:      Cervical: No cervical adenopathy. Skin:     General: Skin is warm and dry. Capillary Refill: Capillary refill takes less than 2 seconds. Coloration: Skin is not pale. Findings: No erythema or rash. Neurological:      Mental Status: She is alert and oriented to person, place, and time. Cranial Nerves: No cranial nerve deficit. Sensory: No sensory deficit. Motor: Weakness present. No abnormal muscle tone. Coordination: Coordination normal.      Gait: Gait abnormal.      Deep Tendon Reflexes: Reflexes normal.   Psychiatric:         Behavior: Behavior normal. Behavior is cooperative. Thought Content: Thought content normal.         Cognition and Memory: Cognition is impaired. Memory is impaired. Judgment: Judgment normal.         Assessment and Plan:      1. Anemia, unspecified type  -Stable    2. Bipolar affective disorder, remission status unspecified ([facility]  -Stable  -Managed with Depakote    3. Vascular dementia without behavioral disturbance (HCC)  -Stable    4. Congestive heart failure, unspecified HF chronicity, unspecified heart failure type ([facility]  --Continue diuretic as prescribed  -Daily weights, call if you gain 3 lbs in one day or 5 lbs. in one week, or for increased edema, sob, cough, orthopnea, or chest pain  -Elevate BLE while in dependent position  -Avoid added salt; reviewed basic skin care.  -Compression socks on during day, off at night  -Maintain follow up with cardiology  - DME Order for Hospital Bed as OP    5. Type 2 diabetes mellitus without complication, with long-term current use of insulin ([facility]  -Maintained by PCP  -Patient currently taking Lantus 6 units at bedtime    6. Dysphagia, unspecified type  -Currently on tube feeding  -She is NPO  -SLP therapy in place    7. Gastroesophageal reflux disease without esophagitis  - omeprazole (PRILOSEC) 2 MG/ML SUSP 2 mg/mL oral suspension; 20 mLs by Per G Tube route Daily  Dispense: 600 mL; Refill: 0    8.  Palliative care encounter  - omeprazole (PRILOSEC) 2 MG/ML SUSP 2 mg/mL oral suspension; 20 mLs by Per G Tube route Daily  Dispense: 600 mL; Refill: 0  - Feeding Tubes - Pump MISC; 1 Pump by Does not apply route continuous  Dispense: 1 each; Refill: 0  - DME Order for Hospital Bed as OP    9. On tube feeding diet  - Feeding Tubes - Pump MISC; 1 Pump by Does not apply route continuous  Dispense: 1 each; Refill: 0    10. Dysphagia as late effect of cerebrovascular accident (CVA)  - DME Order for Hospital Bed as OP  -Has SLP/PT/OT  -Has home health care services through [facility]      Medications Discontinued During This Encounter   Medication Reason    calcium carbonate 600 MG TABS tablet LIST CLEANUP    dextrose 5 % solution LIST CLEANUP    ferrous sulfate 325 (65 Fe) MG tablet LIST CLEANUP    potassium chloride (KLOR-CON M) 10 MEQ extended release tablet LIST CLEANUP    famotidine (PEPCID) 20 MG tablet LIST CLEANUP    pantoprazole (PROTONIX) 40 MG tablet LIST CLEANUP    folic acid (FOLVITE) 1 MG tablet LIST CLEANUP    hypromellose 0.4 % SOLN ophthalmic solution LIST CLEANUP    memantine (NAMENDA) 5 MG tablet LIST CLEANUP    omeprazole (PRILOSEC) 2 MG/ML SUSP 2 mg/mL oral suspension REORDER        - Advance Care Planning   We discussed Living Will (LW), and [facility]) as well as their activation. Patient choice discussed. LW/HCPOA in place Yes; Tasked  for assistance with completing paperwork No; Code status discussed Yes; signed No. Code Full code. All related questions were answered completely. - Goals of Care Discussion:  Disease process and goals of treatment were discussed in basic terms. Leatha's goal is to optimize available comfort care measures minimize hospitalization and prevent ER visits. We discussed the palliative care philosophy in light of those goals. We discussed all care options contingent on treatment response and QOL. Much active listening, presence, and emotional support were given. Discussed with patient/surrogate: POC, Treatment risks/benefits, and alternatives.  All questions were answered. Additionally, a printed copy of the [facility] medications/opioid safety informational sheet was reviewed and given to patient for reference. Opioid contract signed:Yes    Due to acuity, symptomatology and high-risk medication management, I advised patient to Return in about 4 weeks (around 9/4/2020), or if symptoms worsen or fail to improve, for Symptom management. Thanks for the opportunity you have allowed us to provide palliative care to [facility]. We will be in touch as care progresses. Please feel free to reach out to us should you have any questions or requests.     Total Time 60 mins (Adv. Care planning 17 mins)   > 50% Time Spent Counseling/Care coordination yes       Qamar Ireland, ELVA - CNP    Collaborating physician: Dr. Delmar Alcantara

## 2020-08-11 ASSESSMENT — ENCOUNTER SYMPTOMS: TROUBLE SWALLOWING: 1

## 2020-08-18 ENCOUNTER — VIRTUAL VISIT (OUTPATIENT)
Dept: PALLATIVE CARE | Age: 75
End: 2020-08-18
Payer: MEDICARE

## 2020-08-18 PROCEDURE — 99348 HOME/RES VST EST LOW MDM 30: CPT | Performed by: NURSE PRACTITIONER

## 2020-08-18 RX ORDER — MEMANTINE HYDROCHLORIDE 10 MG/1
TABLET ORAL
COMMUNITY
Start: 2020-08-07

## 2020-08-18 ASSESSMENT — ENCOUNTER SYMPTOMS
ALLERGIC/IMMUNOLOGIC NEGATIVE: 1
DIARRHEA: 0
CONSTIPATION: 0
WHEEZING: 0
SHORTNESS OF BREATH: 0
EYES NEGATIVE: 1
NAUSEA: 0

## 2020-08-18 NOTE — PROGRESS NOTES
Subjective:      TELEHEALTH EVALUATION -- Audio/Visual (During IVOSG-84 public health emergency)     Due to COVID 19 outbreak, patient's office visit was converted to a virtual visit. Patient was contacted and agreed to proceed with a virtual visit via Telephone Visit  The risks and benefits of converting to a virtual visit were discussed in light of the current infectious disease epidemic. Patient also understood that insurance coverage and co-pays are up to their individual insurance plans. Patient Id: Seen Leatha at  home in [place] for follow up for symptom management. She was accompanied to the appointment by: her daughter. Chief Complaint   Patient presents with    Follow-up        HPI  Jacinta Sotelo is a 67 y.o. female seen and examined for symptomatic mangement related to Dementia and CHF. Home visit is necessary in lieu of office due to significant frailty and high symptom burden from comorbid illnesses. Pt is calm, cooperative and in NAD. Leatha was observed via [place]. Patient has Dementia. She has expressive aphasia secondary to her recent CVA. Her presented most of the history during the visit. She reports that she is doing well. She denies having any pain or discomfort. She has been taking all of her medications via her peg tube. Denies having any side effects. She is tolerating her tube feeding without any difficulty. Patient daughter reports that she has not been receiving her Lantus insulin since her discharge from the nursing home. Also, she doesn't have a glucometer to check her blood sugars at home. She is confused as to what she is supposed to be doing regarding her mother's blood sugar. She was told that a glucometer would be ordered for her but as of today she still has not received it. Denies significant sleep disturbance, depression, anxiety, or agitation episodes; denies suicidal or homicidal ideation or signs suggesting existential grief or spiritual pain.        Past Medical History:   Diagnosis Date    Alzheimer disease ([facility]     Alzheimer's dementia ([facility]     Diabetes mellitus ([facility]     GERD (gastroesophageal reflux disease)     Hypertension     Osteoarthritis     Retinopathy      Past Surgical History:   Procedure Laterality Date    EYE SURGERY      HYSTERECTOMY      [place] EGD PERCUTANEOUS PLACEMENT GASTROSTOMY TUBE N/A 9/11/2018    EGD ESOPHAGOGASTRODUODENOSCOPY PEG TUBE INSERTION performed by Aamir Camacho MD at [address] [place] [address] EGD TRANSORAL BIOPSY SINGLE/MULTIPLE N/A 1/21/2017    EGD BIOPSY performed by Jase Hector MD at [facility] EGD TRANSORAL BIOPSY SINGLE/MULTIPLE N/A 6/7/2018    EGD performed by Jase Hector MD at [facility] N/A 9/11/2018    EGD ESOPHAGOGASTRODUODENOSCOPY ROOM 179 performed by Jase Hector MD at [facility] History     Socioeconomic History    Marital status: Single     Spouse name: Not on file    Number of children: Not on file    Years of education: Not on file    Highest education level: Not on file   Occupational History    Not on file   Social Needs    Financial resource strain: Not on file    Food insecurity     Worry: Not on file     Inability: Not on file    Transportation needs     Medical: Not on file     Non-medical: Not on file   Tobacco Use    Smoking status: Never Smoker    Smokeless tobacco: Never Used   Substance and Sexual Activity    Alcohol use: No    Drug use: No    Sexual activity: Not on file   Lifestyle    Physical activity     Days per week: Not on file     Minutes per session: Not on file    Stress: Not on file   Relationships    Social connections     Talks on phone: Not on file     Gets together: Not on file     Attends Adventism service: Not on file     Active member of club or organization: Not on file     Attends meetings of clubs or organizations: Not on file     Relationship status: Not on file    Intimate partner violence     Fear of Respiratory: Negative for shortness of breath and wheezing. Cardiovascular: Negative for leg swelling. Gastrointestinal: Negative for constipation, diarrhea and nausea. Endocrine: Negative. Genitourinary: Negative. Musculoskeletal: Positive for gait problem. Skin: Positive for wound. Negative for pallor. Allergic/Immunologic: Negative. Neurological: Negative for dizziness and weakness. Hematological: Negative. Psychiatric/Behavioral: Negative for confusion. Objective: There were no vitals taken for this visit. Wt Readings from Last 3 Encounters:   04/17/20 230 lb (104.3 kg)   09/17/18 241 lb 2.9 oz (109.4 kg)   08/30/18 169 lb 6 oz (76.8 kg)     Physical Exam  Constitutional:       Appearance: She is well-developed. She is not ill-appearing. HENT:      Nose: No congestion or rhinorrhea. Eyes:      General: No scleral icterus. Musculoskeletal:         General: No swelling. Neurological:      Mental Status: She is alert. Motor: Weakness present. Gait: Gait abnormal.         Assessment and Plan:      1. Vascular dementia without behavioral disturbance ([address]  -Stable  -Controlled with Namenda    2. Congestive heart failure, unspecified HF chronicity, unspecified heart failure type ([address]  --Continue diuretic as prescribed  -Daily weights, call if you gain 3 lbs in one day or 5 lbs. in one week, or for increased edema, sob, cough, orthopnea, or chest pain  -Elevate BLE while in dependent position  -Avoid added salt; reviewed basic skin care.  -Compression socks on during day, off at night  -Maintain follow up with cardiology      3. Type 2 diabetes mellitus without complication, with long-term current use of insulin ([address]  -Patient is not taking her insulin.   -Patient daughter reports that she has not been receiving her Lantus insulin since her discharge from the nursing home. Also, she doesn't have a glucometer to check her blood sugars at home.  She is confused as to what she is supposed to be doing regarding her mother's blood sugar. She was told that a glucometer would be ordered for her but as of today she still has not received it.  -Message sent to PCP  -Daughter will also follow up with PCP    4. Dysphagia as late effect of cerebrovascular accident (CVA)  -Currently on tube feeding  -Tolerating tube feeding     5. Gastroesophageal reflux disease without esophagitis  -Stable    6. Palliative care encounter  -Provide support to patient. Call for any questions concerns, or change in condition. There are no discontinued medications. Discussed with patient/surrogate: POC, Treatment risks/benefits, and alternatives including as noted above. All questions were answered. Gave much active listening, presence, and emotional support. Due to acuity, symptomatology and high-risk medication management,   I advised patient to Return in about 4 weeks (around 9/15/2020), or if symptoms worsen or fail to improve, for Symptom management. Total Time 30 mins   > 50% Time Spent Counseling/Care coordination?  yes     Wade Layton, APRN - CNP    Collaborating physician: Dr. Talha Bergman

## 2020-08-20 RX ORDER — POTASSIUM CHLORIDE 3 G/15ML
15 SOLUTION ORAL DAILY
Qty: 600 ML | Refills: 0 | Status: SHIPPED | OUTPATIENT
Start: 2020-08-20

## 2020-08-20 NOTE — TELEPHONE ENCOUNTER
Pts daughter states the last phone visit this was supposed to be ordered would like a new order please

## 2020-08-24 ENCOUNTER — TELEPHONE (OUTPATIENT)
Dept: INTERNAL MEDICINE CLINIC | Age: 75
End: 2020-08-24

## 2020-08-24 NOTE — TELEPHONE ENCOUNTER
Primary care office would like a call from palliative care so they can clarify who is refilling what medications

## 2020-09-10 ENCOUNTER — TELEPHONE (OUTPATIENT)
Dept: PALLATIVE CARE | Age: 75
End: 2020-09-10

## 2020-09-10 NOTE — TELEPHONE ENCOUNTER
Pts iron needs to be changed this is ofelia pt but she is on PTO, pt has been waiting for this medication for a while, pharmacy stated that what was ordered they do not have so it needs to be changed to ferrous sulfate liquid 2 20mg per 5 ml   Please advise

## 2020-09-16 ENCOUNTER — TELEPHONE (OUTPATIENT)
Dept: MEDSURG UNIT | Age: 75
End: 2020-09-16

## 2020-09-16 NOTE — TELEPHONE ENCOUNTER
Phone call placed to patient's daughter, Annamarie Johnston to discuss her concerns regarding her mother's iron supplement. Per the [place] [address] , Kari Clark ([phone]) Annamarie Johnstno was upset and stated that she has not been having any communication from [facility] regarding her mother's iron supplement. Spoke with Annamarie Johnston who told me that she has been requesting a refill of her mother's iron supplement since August and as of today she has not received it. I explained to her that the iron supplement was ordered on three separate occasions (August 20 times two and on September 1). She states that she was told that by the office MA but each time that she went to [address] [place] [address] she was told that they never received the prescription. I called [facility] and spoke to the pharmacist, Marah Arrington. She told me that she doesn't see an order for the iron supplement. I gave her the verbal order for the iron supplement. She states that she will enter it in the computer and she will call the office if there are any issues filling the medication. Phone call placed to patient's daughter, Annamarie Speaks to advised her on the outcome of the phone call to [facility]. She states that she will go to [address] [place] [address] later today to  the medication.

## 2021-10-07 NOTE — OP NOTE
[address]                       [address], [address] [facility]                                 OPERATIVE REPORT    PATIENT NAME: Leslie Vigil                  :        1945  MED REC NO:   [id number]                            ROOM:       S126  ACCOUNT NO:   [de-identified]                           ADMIT DATE: 2018  PROVIDER:     Oni Marks MD    DATE OF PROCEDURE:  2018    PREOPERATIVE DIAGNOSIS:  Dysphagia secondary to Alzheimer's dementia. POSTOPERATIVE DIAGNOSIS:  Dysphagia secondary to Alzheimer's dementia. OPERATION PERFORMED:  Placement of a percutaneous endoscopic gastrostomy  tube. SURGEON:  Oni Marks MD.    ENDOSCOPIST:  Dr. Teresita Malave. INDICATION:  This is a 69-year-old female patient who recently was admitted  because of the change in mental status, and the patient since then is being  fed by a nasogastric tube. Otherwise, the abdomen is soft. OPERATIVE PROCEDURE:  With the patient under MAC type of anesthesia in the  supine position and after the upper endoscopy was performed by Dr. Teresita Malave,  which was essentially normal, immediately the abdomen was prepped and  draped in the usual manner. Using the help of the transillumination from  the scope, the selection for placement of the tube was made in the upper  epigastrium, and immediately the skin was infiltrated with 1% lidocaine and  a small stab wound was made through which an Angiocath was inserted  directly into the lumen of the stomach after the stomach was insufflated by  the endoscopist.    Through the same Angiocath, a #1 string was inserted into the stomach and  delivered by the endoscopy through the mouth of the patient. The same  string was used to attach a gastrostomy tube, a Ponsky-skin like tube,  which was pulled through the mouth of the patient into the stomach and out  through the previous stab wound in the epigastric area.     After the bell of the
[id number]    CC:  MD Ernestine Palomares MD
negative

## 2024-02-18 NOTE — TELEPHONE ENCOUNTER
Pts daughter wants to know if her mother should still be on the liquid iron. Gen:  Well appearing in NAD  Head:  NC/AT  HEENT: pupils perrl,no pharyngeal erythema, uvula midline, bilateral ear tubes in place with copious amounts of clear drainage from bilateral ear tubes, friable and redness to aneruior left naris, pt actively picking nose during exam  Cardiac: S1S2, RRR  Abd: Soft, non tender  Resp: No distress, CTA   musculoskeletal:: no deformities, no swelling, strength +5/+5  Skin: warm and dry as visualized, no rashes  Neuro: PRITI,

## (undated) DEVICE — SONY PRINTER PAPER

## (undated) DEVICE — ADAPTER FLSH PMP FLD MGMT GI IRRIG OFP 2 DISPOSABLE

## (undated) DEVICE — GLOVE SURG SZ 85 STD WHT LTX SYN POLYMER BEAD REINF ANTI RL

## (undated) DEVICE — LABEL MED MINI W/ MARKER

## (undated) DEVICE — Device: Brand: ENDO SMARTCAP

## (undated) DEVICE — LUBRICANT SURG JELLY ST BACTER TUBE 4.25OZ

## (undated) DEVICE — ENDO CARRY-ON PROCEDURE KIT: Brand: ENDO CARRY-ON PROCEDURE KIT

## (undated) DEVICE — CONMED SCOPE SAVER BITE BLOCK, 20X27 MM: Brand: SCOPE SAVER

## (undated) DEVICE — BRUSH CLEANING ENDOSCOPE CHAN DISP

## (undated) DEVICE — RESTRAINT EXTREMITY CONTACT CLOSURE

## (undated) DEVICE — TUBE SET 96 MM 64 MM H2O PERISTALTIC STD AUX CHANNEL

## (undated) DEVICE — COVER,TABLE,44X90,STERILE: Brand: MEDLINE

## (undated) DEVICE — STERILE POLYISOPRENE POWDER-FREE SURGICAL GLOVES: Brand: PROTEXIS

## (undated) DEVICE — Z DISCONTINUED PER STERIS KIT PEG INIT PLCMNT SAFT 20FR

## (undated) DEVICE — TOWEL,OR,DSP,ST,BLUE,STD,4/PK,20PK/CS: Brand: MEDLINE

## (undated) DEVICE — Z DISCONTINUED USE 2516375 APPLICATOR MEDICATED 3 CC CLR STRL CHLORAPREP